# Patient Record
Sex: MALE | Race: OTHER | Employment: FULL TIME | URBAN - METROPOLITAN AREA
[De-identification: names, ages, dates, MRNs, and addresses within clinical notes are randomized per-mention and may not be internally consistent; named-entity substitution may affect disease eponyms.]

---

## 2024-09-02 ENCOUNTER — OFFICE VISIT (OUTPATIENT)
Dept: URGENT CARE | Facility: CLINIC | Age: 59
End: 2024-09-02
Payer: COMMERCIAL

## 2024-09-02 ENCOUNTER — APPOINTMENT (EMERGENCY)
Dept: CT IMAGING | Facility: HOSPITAL | Age: 59
DRG: 282 | End: 2024-09-02
Payer: COMMERCIAL

## 2024-09-02 ENCOUNTER — HOSPITAL ENCOUNTER (INPATIENT)
Facility: HOSPITAL | Age: 59
LOS: 3 days | DRG: 282 | End: 2024-09-05
Attending: EMERGENCY MEDICINE | Admitting: STUDENT IN AN ORGANIZED HEALTH CARE EDUCATION/TRAINING PROGRAM
Payer: COMMERCIAL

## 2024-09-02 VITALS
DIASTOLIC BLOOD PRESSURE: 99 MMHG | SYSTOLIC BLOOD PRESSURE: 139 MMHG | OXYGEN SATURATION: 100 % | RESPIRATION RATE: 26 BRPM | HEART RATE: 78 BPM

## 2024-09-02 DIAGNOSIS — R05.1 ACUTE COUGH: ICD-10-CM

## 2024-09-02 DIAGNOSIS — R29.898 LEG WEAKNESS: Primary | ICD-10-CM

## 2024-09-02 DIAGNOSIS — R07.9 CHEST PAIN, UNSPECIFIED TYPE: Primary | ICD-10-CM

## 2024-09-02 DIAGNOSIS — R79.89 ELEVATED TROPONIN I LEVEL: ICD-10-CM

## 2024-09-02 DIAGNOSIS — R79.89 ELEVATED TROPONIN: ICD-10-CM

## 2024-09-02 DIAGNOSIS — R61 DIAPHORESIS: ICD-10-CM

## 2024-09-02 PROBLEM — Z86.73 HISTORY OF CARDIOEMBOLIC CEREBROVASCULAR ACCIDENT (CVA): Status: ACTIVE | Noted: 2024-09-02

## 2024-09-02 PROBLEM — Z86.73 HISTORY OF CARDIOEMBOLIC CEREBROVASCULAR ACCIDENT (CVA): Status: RESOLVED | Noted: 2024-09-02 | Resolved: 2024-09-02

## 2024-09-02 PROBLEM — E11.9 TYPE 2 DIABETES MELLITUS, WITHOUT LONG-TERM CURRENT USE OF INSULIN (HCC): Status: ACTIVE | Noted: 2024-09-02

## 2024-09-02 PROBLEM — I10 ACCELERATED HYPERTENSION: Status: ACTIVE | Noted: 2024-09-02

## 2024-09-02 PROBLEM — R73.9 HYPERGLYCEMIA: Status: ACTIVE | Noted: 2024-09-02

## 2024-09-02 LAB
2HR DELTA HS TROPONIN: 1571 NG/L
4HR DELTA HS TROPONIN: 1670 NG/L
ANION GAP SERPL CALCULATED.3IONS-SCNC: 12 MMOL/L (ref 4–13)
APTT PPP: 29 SECONDS (ref 23–34)
APTT PPP: 29 SECONDS (ref 23–34)
APTT PPP: 41 SECONDS (ref 23–34)
ATRIAL RATE: 65 BPM
ATRIAL RATE: 70 BPM
BNP SERPL-MCNC: 242 PG/ML (ref 0–100)
BUN SERPL-MCNC: 18 MG/DL (ref 5–25)
CALCIUM SERPL-MCNC: 9.6 MG/DL (ref 8.4–10.2)
CARDIAC TROPONIN I PNL SERPL HS: 2088 NG/L
CARDIAC TROPONIN I PNL SERPL HS: 2187 NG/L
CARDIAC TROPONIN I PNL SERPL HS: 517 NG/L
CHLORIDE SERPL-SCNC: 107 MMOL/L (ref 96–108)
CHOLEST SERPL-MCNC: 162 MG/DL
CO2 SERPL-SCNC: 20 MMOL/L (ref 21–32)
CREAT SERPL-MCNC: 0.94 MG/DL (ref 0.6–1.3)
ERYTHROCYTE [DISTWIDTH] IN BLOOD BY AUTOMATED COUNT: 12.2 % (ref 11.6–15.1)
ERYTHROCYTE [DISTWIDTH] IN BLOOD BY AUTOMATED COUNT: 12.4 % (ref 11.6–15.1)
EST. AVERAGE GLUCOSE BLD GHB EST-MCNC: 194 MG/DL
FLUAV RNA RESP QL NAA+PROBE: NEGATIVE
FLUBV RNA RESP QL NAA+PROBE: NEGATIVE
GFR SERPL CREATININE-BSD FRML MDRD: 89 ML/MIN/1.73SQ M
GLUCOSE SERPL-MCNC: 175 MG/DL (ref 65–140)
GLUCOSE SERPL-MCNC: 201 MG/DL (ref 65–140)
GLUCOSE SERPL-MCNC: 207 MG/DL (ref 65–140)
GLUCOSE SERPL-MCNC: 210 MG/DL (ref 65–140)
HBA1C MFR BLD: 8.4 %
HCT VFR BLD AUTO: 34.1 % (ref 36.5–49.3)
HCT VFR BLD AUTO: 36.4 % (ref 36.5–49.3)
HDLC SERPL-MCNC: 49 MG/DL
HGB BLD-MCNC: 11.8 G/DL (ref 12–17)
HGB BLD-MCNC: 12.5 G/DL (ref 12–17)
INR PPP: 0.97 (ref 0.85–1.19)
INR PPP: 1 (ref 0.85–1.19)
LDLC SERPL CALC-MCNC: 76 MG/DL (ref 0–100)
MCH RBC QN AUTO: 30.6 PG (ref 26.8–34.3)
MCH RBC QN AUTO: 31 PG (ref 26.8–34.3)
MCHC RBC AUTO-ENTMCNC: 34.3 G/DL (ref 31.4–37.4)
MCHC RBC AUTO-ENTMCNC: 34.6 G/DL (ref 31.4–37.4)
MCV RBC AUTO: 89 FL (ref 82–98)
MCV RBC AUTO: 90 FL (ref 82–98)
NONHDLC SERPL-MCNC: 113 MG/DL
P AXIS: 26 DEGREES
P AXIS: 63 DEGREES
PLATELET # BLD AUTO: 301 THOUSANDS/UL (ref 149–390)
PLATELET # BLD AUTO: 311 THOUSANDS/UL (ref 149–390)
PMV BLD AUTO: 10.4 FL (ref 8.9–12.7)
PMV BLD AUTO: 10.7 FL (ref 8.9–12.7)
POTASSIUM SERPL-SCNC: 3.7 MMOL/L (ref 3.5–5.3)
PR INTERVAL: 118 MS
PR INTERVAL: 160 MS
PROCALCITONIN SERPL-MCNC: <0.05 NG/ML
PROTHROMBIN TIME: 13.6 SECONDS (ref 12.3–15)
PROTHROMBIN TIME: 13.9 SECONDS (ref 12.3–15)
QRS AXIS: 39 DEGREES
QRS AXIS: 47 DEGREES
QRSD INTERVAL: 86 MS
QRSD INTERVAL: 90 MS
QT INTERVAL: 400 MS
QT INTERVAL: 428 MS
QTC INTERVAL: 432 MS
QTC INTERVAL: 445 MS
RBC # BLD AUTO: 3.81 MILLION/UL (ref 3.88–5.62)
RBC # BLD AUTO: 4.09 MILLION/UL (ref 3.88–5.62)
RSV RNA RESP QL NAA+PROBE: NEGATIVE
SARS-COV-2 AG UPPER RESP QL IA: NEGATIVE
SARS-COV-2 RNA RESP QL NAA+PROBE: NEGATIVE
SODIUM SERPL-SCNC: 139 MMOL/L (ref 135–147)
T WAVE AXIS: -12 DEGREES
T WAVE AXIS: 10 DEGREES
TRIGL SERPL-MCNC: 185 MG/DL
VALID CONTROL: NORMAL
VENTRICULAR RATE: 65 BPM
VENTRICULAR RATE: 70 BPM
WBC # BLD AUTO: 10.17 THOUSAND/UL (ref 4.31–10.16)
WBC # BLD AUTO: 14.5 THOUSAND/UL (ref 4.31–10.16)

## 2024-09-02 PROCEDURE — 83036 HEMOGLOBIN GLYCOSYLATED A1C: CPT | Performed by: STUDENT IN AN ORGANIZED HEALTH CARE EDUCATION/TRAINING PROGRAM

## 2024-09-02 PROCEDURE — 93005 ELECTROCARDIOGRAM TRACING: CPT

## 2024-09-02 PROCEDURE — G0382 LEV 3 HOSP TYPE B ED VISIT: HCPCS

## 2024-09-02 PROCEDURE — 99223 1ST HOSP IP/OBS HIGH 75: CPT | Performed by: STUDENT IN AN ORGANIZED HEALTH CARE EDUCATION/TRAINING PROGRAM

## 2024-09-02 PROCEDURE — 96361 HYDRATE IV INFUSION ADD-ON: CPT

## 2024-09-02 PROCEDURE — 36415 COLL VENOUS BLD VENIPUNCTURE: CPT | Performed by: EMERGENCY MEDICINE

## 2024-09-02 PROCEDURE — 93010 ELECTROCARDIOGRAM REPORT: CPT | Performed by: STUDENT IN AN ORGANIZED HEALTH CARE EDUCATION/TRAINING PROGRAM

## 2024-09-02 PROCEDURE — 99285 EMERGENCY DEPT VISIT HI MDM: CPT

## 2024-09-02 PROCEDURE — 85027 COMPLETE CBC AUTOMATED: CPT | Performed by: EMERGENCY MEDICINE

## 2024-09-02 PROCEDURE — 84484 ASSAY OF TROPONIN QUANT: CPT | Performed by: EMERGENCY MEDICINE

## 2024-09-02 PROCEDURE — 70498 CT ANGIOGRAPHY NECK: CPT

## 2024-09-02 PROCEDURE — 96360 HYDRATION IV INFUSION INIT: CPT

## 2024-09-02 PROCEDURE — 70496 CT ANGIOGRAPHY HEAD: CPT

## 2024-09-02 PROCEDURE — 83880 ASSAY OF NATRIURETIC PEPTIDE: CPT | Performed by: STUDENT IN AN ORGANIZED HEALTH CARE EDUCATION/TRAINING PROGRAM

## 2024-09-02 PROCEDURE — 85610 PROTHROMBIN TIME: CPT | Performed by: EMERGENCY MEDICINE

## 2024-09-02 PROCEDURE — 96374 THER/PROPH/DIAG INJ IV PUSH: CPT

## 2024-09-02 PROCEDURE — 74174 CTA ABD&PLVS W/CONTRAST: CPT

## 2024-09-02 PROCEDURE — 96375 TX/PRO/DX INJ NEW DRUG ADDON: CPT

## 2024-09-02 PROCEDURE — 85730 THROMBOPLASTIN TIME PARTIAL: CPT | Performed by: STUDENT IN AN ORGANIZED HEALTH CARE EDUCATION/TRAINING PROGRAM

## 2024-09-02 PROCEDURE — 85730 THROMBOPLASTIN TIME PARTIAL: CPT | Performed by: EMERGENCY MEDICINE

## 2024-09-02 PROCEDURE — 84145 PROCALCITONIN (PCT): CPT | Performed by: STUDENT IN AN ORGANIZED HEALTH CARE EDUCATION/TRAINING PROGRAM

## 2024-09-02 PROCEDURE — 80061 LIPID PANEL: CPT | Performed by: STUDENT IN AN ORGANIZED HEALTH CARE EDUCATION/TRAINING PROGRAM

## 2024-09-02 PROCEDURE — 80048 BASIC METABOLIC PNL TOTAL CA: CPT | Performed by: EMERGENCY MEDICINE

## 2024-09-02 PROCEDURE — 0241U HB NFCT DS VIR RESP RNA 4 TRGT: CPT | Performed by: EMERGENCY MEDICINE

## 2024-09-02 PROCEDURE — 87811 SARS-COV-2 COVID19 W/OPTIC: CPT

## 2024-09-02 PROCEDURE — 71275 CT ANGIOGRAPHY CHEST: CPT

## 2024-09-02 PROCEDURE — 99255 IP/OBS CONSLTJ NEW/EST HI 80: CPT | Performed by: STUDENT IN AN ORGANIZED HEALTH CARE EDUCATION/TRAINING PROGRAM

## 2024-09-02 PROCEDURE — 82948 REAGENT STRIP/BLOOD GLUCOSE: CPT

## 2024-09-02 PROCEDURE — 99291 CRITICAL CARE FIRST HOUR: CPT | Performed by: EMERGENCY MEDICINE

## 2024-09-02 RX ORDER — CARVEDILOL 12.5 MG/1
12.5 TABLET ORAL 2 TIMES DAILY WITH MEALS
Status: DISCONTINUED | OUTPATIENT
Start: 2024-09-02 | End: 2024-09-02

## 2024-09-02 RX ORDER — LISINOPRIL 10 MG/1
10 TABLET ORAL DAILY
COMMUNITY
End: 2024-09-15

## 2024-09-02 RX ORDER — HEPARIN SODIUM 1000 [USP'U]/ML
3300 INJECTION, SOLUTION INTRAVENOUS; SUBCUTANEOUS ONCE
Status: COMPLETED | OUTPATIENT
Start: 2024-09-02 | End: 2024-09-02

## 2024-09-02 RX ORDER — NITROGLYCERIN 0.4 MG/1
0.4 TABLET SUBLINGUAL ONCE
Status: COMPLETED | OUTPATIENT
Start: 2024-09-02 | End: 2024-09-02

## 2024-09-02 RX ORDER — HEPARIN SODIUM 1000 [USP'U]/ML
3300 INJECTION, SOLUTION INTRAVENOUS; SUBCUTANEOUS EVERY 6 HOURS PRN
Status: DISCONTINUED | OUTPATIENT
Start: 2024-09-02 | End: 2024-09-05

## 2024-09-02 RX ORDER — FENTANYL CITRATE 50 UG/ML
50 INJECTION, SOLUTION INTRAMUSCULAR; INTRAVENOUS ONCE
Status: COMPLETED | OUTPATIENT
Start: 2024-09-02 | End: 2024-09-02

## 2024-09-02 RX ORDER — HEPARIN SODIUM 1000 [USP'U]/ML
1650 INJECTION, SOLUTION INTRAVENOUS; SUBCUTANEOUS EVERY 6 HOURS PRN
Status: DISCONTINUED | OUTPATIENT
Start: 2024-09-02 | End: 2024-09-05

## 2024-09-02 RX ORDER — NITROGLYCERIN 0.4 MG/1
0.4 TABLET SUBLINGUAL
Status: DISCONTINUED | OUTPATIENT
Start: 2024-09-02 | End: 2024-09-05 | Stop reason: HOSPADM

## 2024-09-02 RX ORDER — LISINOPRIL 20 MG/1
20 TABLET ORAL DAILY
Status: DISCONTINUED | OUTPATIENT
Start: 2024-09-02 | End: 2024-09-05

## 2024-09-02 RX ORDER — LABETALOL HYDROCHLORIDE 5 MG/ML
20 INJECTION, SOLUTION INTRAVENOUS ONCE
Status: COMPLETED | OUTPATIENT
Start: 2024-09-02 | End: 2024-09-02

## 2024-09-02 RX ORDER — ASPIRIN 81 MG/1
324 TABLET, CHEWABLE ORAL ONCE
Status: COMPLETED | OUTPATIENT
Start: 2024-09-02 | End: 2024-09-02

## 2024-09-02 RX ORDER — ATORVASTATIN CALCIUM 40 MG/1
40 TABLET, FILM COATED ORAL
Status: DISCONTINUED | OUTPATIENT
Start: 2024-09-02 | End: 2024-09-04

## 2024-09-02 RX ORDER — HYDRALAZINE HYDROCHLORIDE 20 MG/ML
10 INJECTION INTRAMUSCULAR; INTRAVENOUS EVERY 6 HOURS PRN
Status: DISCONTINUED | OUTPATIENT
Start: 2024-09-02 | End: 2024-09-05

## 2024-09-02 RX ORDER — AMLODIPINE BESYLATE 5 MG/1
5 TABLET ORAL DAILY
Status: DISCONTINUED | OUTPATIENT
Start: 2024-09-02 | End: 2024-09-05 | Stop reason: HOSPADM

## 2024-09-02 RX ORDER — INSULIN LISPRO 100 [IU]/ML
1-5 INJECTION, SOLUTION INTRAVENOUS; SUBCUTANEOUS
Status: DISCONTINUED | OUTPATIENT
Start: 2024-09-02 | End: 2024-09-05 | Stop reason: HOSPADM

## 2024-09-02 RX ORDER — HEPARIN SODIUM 10000 [USP'U]/100ML
3-20 INJECTION, SOLUTION INTRAVENOUS
Status: DISCONTINUED | OUTPATIENT
Start: 2024-09-02 | End: 2024-09-05

## 2024-09-02 RX ADMIN — NITROGLYCERIN 0.4 MG: 0.4 TABLET SUBLINGUAL at 14:32

## 2024-09-02 RX ADMIN — TICAGRELOR 180 MG: 90 TABLET ORAL at 16:35

## 2024-09-02 RX ADMIN — ATORVASTATIN CALCIUM 40 MG: 40 TABLET, FILM COATED ORAL at 16:36

## 2024-09-02 RX ADMIN — INSULIN LISPRO 1 UNITS: 100 INJECTION, SOLUTION INTRAVENOUS; SUBCUTANEOUS at 16:36

## 2024-09-02 RX ADMIN — LABETALOL HYDROCHLORIDE 20 MG: 5 INJECTION, SOLUTION INTRAVENOUS at 10:32

## 2024-09-02 RX ADMIN — AMLODIPINE BESYLATE 5 MG: 5 TABLET ORAL at 15:07

## 2024-09-02 RX ADMIN — HEPARIN SODIUM 3300 UNITS: 1000 INJECTION INTRAVENOUS; SUBCUTANEOUS at 13:26

## 2024-09-02 RX ADMIN — NITROGLYCERIN 0.4 MG: 0.4 TABLET SUBLINGUAL at 10:44

## 2024-09-02 RX ADMIN — ASPIRIN 324 MG: 81 TABLET, CHEWABLE ORAL at 12:37

## 2024-09-02 RX ADMIN — IOHEXOL 180 ML: 350 INJECTION, SOLUTION INTRAVENOUS at 10:18

## 2024-09-02 RX ADMIN — SODIUM CHLORIDE 1000 ML: 0.9 INJECTION, SOLUTION INTRAVENOUS at 10:29

## 2024-09-02 RX ADMIN — HEPARIN SODIUM 3300 UNITS: 1000 INJECTION INTRAVENOUS; SUBCUTANEOUS at 21:19

## 2024-09-02 RX ADMIN — HEPARIN SODIUM 12 UNITS/KG/HR: 10000 INJECTION, SOLUTION INTRAVENOUS at 13:28

## 2024-09-02 RX ADMIN — NITROGLYCERIN 0.4 MG: 0.4 TABLET SUBLINGUAL at 12:37

## 2024-09-02 RX ADMIN — FENTANYL CITRATE 50 MCG: 50 INJECTION INTRAMUSCULAR; INTRAVENOUS at 10:31

## 2024-09-02 NOTE — CONSULTS
Consultation - Cardiology   Aristoteles Toribio Reyes 58 y.o. male MRN: [id number]  Unit/Bed#: -01 Encounter: [id number]    Assessment & Plan      [id number]    Assessment:  58-year-old gentleman with history of stroke, diabetes, hypertension, hyperlipidemia presenting with acute onset chest pain found to have elevated troponin without ST elevation.  Presentation consistent with typical cardiac chest pain concerning for NSTEMI with pain controlled with nitro.  Melony score of 64, Killip class 1. Assessed by ED w/ no c/I to Heparin, ASA. Patient with head CT with findings of no brain hemorrhage, finding consistent with chronic appearing inferior left cerebellar infarct. Finding of nonspecific 5 mm calcification vs. Calcific lesion in right temporo-occipital junction. Although patient's troponin can be potentially explained by elevated blood pressure, he had ongoing chest pain while I was assessing him at the bedside with BPs ~160 systolic.     #NSTEMI  #HTN  #hx of stroke     Plan:  -Heparin bolus and gtt  -ASA load of 325 then begin 81 mg qd  -Would load patient with Ticagrelor 180 mg and then 90 mg BID starting 6-12 hours after initial dose. If there is a c/I due to brain pathology would d/c  -atorvastatin 80 mg   -Serial EKG, assess for JEREMY or dynamic changes.   -Trend Troponin to peak  -Hemoglobin A1c, Lipid panel, TSH  -Can give sublingual : SL (0.3-0.6mg) x3, if refractory  gtt (start 5-10mcg/min) [would call cardiology and interventionalist with repeat troponin if considering gtt]  -Restart lisinopril if no hemodynamic instability, would ideally start Beta blocker 24 hours from presentation   -Up-titrate anti hypertensive regimen to control BP       History of Present Illness   Physician Requesting Consult: Ayad Noriega MD  Reason for Consult / Principal Problem: NSTEMI  HPI: Aristoteles Toribio Reyes is a 58 y.o. year old male who presents with history of stroke, hypertension, type 2 diabetes  presenting with substernal chest pain with multiple episodes lasting anywhere from 15 to 30 minutes the last 18 hours.  Patient reports that the chest pain radiates to his arm and is associated with a headache and nausea.  He went to sleep but then woke up in the morning and had another episode of chest pain which prompted him to come to the ED.  Since coming to the ED patient's pain resolved with heparin, nitro, and morphine.  Initial EKG sent to me showed no ST elevations with no serial changes.  Of note the patient had significant elevated blood pressure and reported not taking his lisinopril.  He mentioned that he does miss medications from time to time.  Social history obtained from chart review not from patient as he had his whole family in the room (Uses cannabis but no mention of tobacco or ETOH). His wife reported that when they were moving him around in the ED and the floor he was having worsening anginal chest pain. I discussed the case with the ED prior to my examination and team the patient had a presentation concerning for ACS.  They ruled out aortic dissection and I recommended if no contraindication, the patient had adequate BP control, and rising troponin with clinical risk factors then it was likely appropriate to begin heparin.  Labs significant for initial troponin of 517 with current peak at 2088.  Patient with a WBC count of 14.5 with hemoglobin 11.8. W    Inpatient consult to Cardiology  Consult performed by: Jack Baptiste MD  Consult ordered by: Ayad Noriega MD          Review of Systems   Constitutional:  Positive for activity change and diaphoresis.   HENT:  Negative for congestion.    Respiratory:  Positive for cough and chest tightness. Negative for shortness of breath.    Cardiovascular:  Positive for chest pain. Negative for palpitations and leg swelling.   Gastrointestinal:  Positive for nausea. Negative for abdominal distention.   Musculoskeletal:  Negative for back pain.   Skin:   Negative for rash.   Neurological:  Positive for dizziness and light-headedness. Negative for weakness.       Historical Information   Past Medical History:   Diagnosis Date    History of cardioembolic cerebrovascular accident (CVA) 09/02/2024    History of cardioembolic cerebrovascular accident (CVA) 09/02/2024     No past surgical history on file.  Social History     Substance and Sexual Activity   Alcohol Use Not on file     Social History     Substance and Sexual Activity   Drug Use Not on file     E-Cigarette/Vaping     E-Cigarette/Vaping Substances     Social History     Tobacco Use   Smoking Status Not on file   Smokeless Tobacco Not on file     Family History: No family history on file.    Meds/Allergies   all current active meds have been reviewed  No Known Allergies    Objective   Vitals: Blood pressure (!) 172/88, pulse 73, temperature 97.7 °F (36.5 °C), resp. rate (!) 27, weight 58 kg (127 lb 13.9 oz), SpO2 97%.  Orthostatic Blood Pressures      Flowsheet Row Most Recent Value   Blood Pressure 172/88 filed at 09/02/2024 1345              Intake/Output Summary (Last 24 hours) at 9/2/2024 1439  Last data filed at 9/2/2024 1412  Gross per 24 hour   Intake 4.84 ml   Output --   Net 4.84 ml       Invasive Devices       Peripheral Intravenous Line  Duration             Peripheral IV 09/02/24 Left Antecubital <1 day    Peripheral IV 09/02/24 Right Antecubital <1 day                    Physical Exam  GEN: NAD  Cardiac: RRR, No murmurs  Neck: No jvd  Lung: CTAB  Ext: No edema     Lab Results: I have personally reviewed pertinent lab results.    Imaging: I have personally reviewed pertinent reports.    EKG: NSR   VTE Prophylaxis: Heparin    Code Status: Level 1 - Full Code  Advance Directive and Living Will:      Power of :    POLST:      Counseling / Coordination of Care  Total floor / unit time spent today 45 minutes.  Greater than 50% of total time was spent with the patient and / or family counseling  and / or coordination of care.  A description of the counseling / coordination of care: This case case with the ED, hospitalist, and patient and family with .

## 2024-09-02 NOTE — ASSESSMENT & PLAN NOTE
Looks like typical chest pain with elevated troponin  Cardiology recommended ER to start IV heparin  Patient is on aspirin 325 mg after the stroke, continue with the same dose and Lipitor 80 mg  Echocardiogram ordered

## 2024-09-02 NOTE — LETTER
[facility] 4TH FLOOR MED SURG UNIT  [address]  [place] [address]  Dept: [phone]    September 3, 2024     Patient: Anitra Allan   Date of Birth:    Date of Visit: 9/2/2024     To Whom it May Concern:    Anitra Allan was at the hospital visiting a patient under my care on 9/3/2024.     If you have any questions or concerns, please don't hesitate to call.         Sincerely,          Jose Whipple PA-C

## 2024-09-02 NOTE — ASSESSMENT & PLAN NOTE
Patient admitted with blood pressure more than 200 systolic  Admitted with retrosternal crushing chest pain since last night  EKG showed normal sinus rhythm  Patient was given IV NT hypertensives, sublingual nitro  Blood pressure later improved, troponin significantly elevated more than 500  Patient not sure about all of his medicines but he brought lisinopril, aspirin 325 mg and metformin 1 g twice daily after the stroke  Will resume lisinopril 40 mg

## 2024-09-02 NOTE — ASSESSMENT & PLAN NOTE
"No results found for: \"HGBA1C\"    Recent Labs     09/02/24  0944 09/02/24  1002   POCGLU 207* 201*       Blood Sugar Average: Last 72 hrs:  (P) 201  Will check glycohemoglobin  hold home dose of metformin  Insulin sliding scale  "

## 2024-09-02 NOTE — PLAN OF CARE
Problem: PAIN - ADULT  Goal: Verbalizes/displays adequate comfort level or baseline comfort level  Description: Interventions:  - Encourage patient to monitor pain and request assistance  - Assess pain using appropriate pain scale  - Administer analgesics based on type and severity of pain and evaluate response  - Implement non-pharmacological measures as appropriate and evaluate response  - Consider cultural and social influences on pain and pain management  - Notify physician/advanced practitioner if interventions unsuccessful or patient reports new pain  Outcome: Progressing     Problem: INFECTION - ADULT  Goal: Absence or prevention of progression during hospitalization  Description: INTERVENTIONS:  - Assess and monitor for signs and symptoms of infection  - Monitor lab/diagnostic results  - Monitor all insertion sites, i.e. indwelling lines, tubes, and drains  - Monitor endotracheal if appropriate and nasal secretions for changes in amount and color  - Lebeau appropriate cooling/warming therapies per order  - Administer medications as ordered  - Instruct and encourage patient and family to use good hand hygiene technique  - Identify and instruct in appropriate isolation precautions for identified infection/condition  Outcome: Progressing  Goal: Absence of fever/infection during neutropenic period  Description: INTERVENTIONS:  - Monitor WBC    Outcome: Progressing     Problem: SAFETY ADULT  Goal: Patient will remain free of falls  Description: INTERVENTIONS:  - Educate patient/family on patient safety including physical limitations  - Instruct patient to call for assistance with activity   - Consult OT/PT to assist with strengthening/mobility   - Keep Call bell within reach  - Keep bed low and locked with side rails adjusted as appropriate  - Keep care items and personal belongings within reach  - Initiate and maintain comfort rounds  - Make Fall Risk Sign visible to staff  - Offer Toileting every  Hours,  in advance of need  - Initiate/Maintain alarm  - Obtain necessary fall risk management equipment:   - Apply yellow socks and bracelet for high fall risk patients  - Consider moving patient to room near nurses station  Outcome: Progressing  Goal: Maintain or return to baseline ADL function  Description: INTERVENTIONS:  -  Assess patient's ability to carry out ADLs; assess patient's baseline for ADL function and identify physical deficits which impact ability to perform ADLs (bathing, care of mouth/teeth, toileting, grooming, dressing, etc.)  - Assess/evaluate cause of self-care deficits   - Assess range of motion  - Assess patient's mobility; develop plan if impaired  - Assess patient's need for assistive devices and provide as appropriate  - Encourage maximum independence but intervene and supervise when necessary  - Involve family in performance of ADLs  - Assess for home care needs following discharge   - Consider OT consult to assist with ADL evaluation and planning for discharge  - Provide patient education as appropriate  Outcome: Progressing  Goal: Maintains/Returns to pre admission functional level  Description: INTERVENTIONS:  - Perform AM-PAC 6 Click Basic Mobility/ Daily Activity assessment daily.  - Set and communicate daily mobility goal to care team and patient/family/caregiver.   - Collaborate with rehabilitation services on mobility goals if consulted  - Perform Range of Motion  times a day.  - Reposition patient every  hours.  - Dangle patient  times a day  - Stand patient  times a day  - Ambulate patient  times a day  - Out of bed to chair  times a day   - Out of bed for meals  times a day  - Out of bed for toileting  - Record patient progress and toleration of activity level   Outcome: Progressing     Problem: DISCHARGE PLANNING  Goal: Discharge to home or other facility with appropriate resources  Description: INTERVENTIONS:  - Identify barriers to discharge w/patient and caregiver  - Arrange for  needed discharge resources and transportation as appropriate  - Identify discharge learning needs (meds, wound care, etc.)  - Arrange for interpretive services to assist at discharge as needed  - Refer to Case Management Department for coordinating discharge planning if the patient needs post-hospital services based on physician/advanced practitioner order or complex needs related to functional status, cognitive ability, or social support system  Outcome: Progressing     Problem: Knowledge Deficit  Goal: Patient/family/caregiver demonstrates understanding of disease process, treatment plan, medications, and discharge instructions  Description: Complete learning assessment and assess knowledge base.  Interventions:  - Provide teaching at level of understanding  - Provide teaching via preferred learning methods  Outcome: Progressing

## 2024-09-02 NOTE — ED PROVIDER NOTES
History  Chief Complaint   Patient presents with    Chest Pain     Pt BIB EMS c/o chest pain that is worst when he is resting, pain started last night,sent to ER from urgent care, HX  TIA, HTN.      Patient is a 58-year-old male past medical history of CVA, hypertension, diabetes presenting with chest pain.  Patient notes crushing squeezing chest pain since 11 PM last night which is constant and worse at rest, nonradiating.  Associated with shortness of breath, nausea, headache, lightheadedness.  Came by EMS and did not receive any medication from them however states he did take his antihypertensive medications this morning.  He states that this happened 2 months ago prior to his TIA.  Denies any leg pain or swelling, cough or fevers, vomiting.        Prior to Admission Medications   Prescriptions Last Dose Informant Patient Reported? Taking?   lisinopril (ZESTRIL) 10 mg tablet   Yes Yes   Sig: Take 10 mg by mouth daily   metFORMIN (GLUCOPHAGE) 1000 MG tablet   Yes Yes   Sig: Take 1,000 mg by mouth 2 (two) times a day with meals      Facility-Administered Medications: None       Past Medical History:   Diagnosis Date    History of cardioembolic cerebrovascular accident (CVA) 09/02/2024    History of cardioembolic cerebrovascular accident (CVA) 09/02/2024       No past surgical history on file.    No family history on file.  I have reviewed and agree with the history as documented.    E-Cigarette/Vaping     E-Cigarette/Vaping Substances     Social History     Tobacco Use    Smoking status: Some Days     Types: Cigarettes   Substance Use Topics    Alcohol use: Never    Drug use: Never       Review of Systems   All other systems reviewed and are negative.      Physical Exam  Physical Exam  Vitals reviewed.   Constitutional:       General: He is not in acute distress.     Appearance: Normal appearance. He is not ill-appearing.   HENT:      Mouth/Throat:      Mouth: Mucous membranes are moist.   Eyes:      Extraocular  Movements: Extraocular movements intact.      Conjunctiva/sclera: Conjunctivae normal.      Pupils: Pupils are equal, round, and reactive to light.   Cardiovascular:      Rate and Rhythm: Normal rate and regular rhythm.      Pulses:           Dorsalis pedis pulses are 2+ on the right side and 2+ on the left side.      Heart sounds: Normal heart sounds.   Pulmonary:      Effort: Pulmonary effort is normal.      Breath sounds: Normal breath sounds.   Abdominal:      General: Abdomen is flat.      Palpations: Abdomen is soft.      Tenderness: There is no abdominal tenderness.   Musculoskeletal:         General: No swelling. Normal range of motion.      Cervical back: Neck supple.      Right lower leg: No tenderness. No edema.      Left lower leg: No tenderness. No edema.   Skin:     General: Skin is warm and dry.   Neurological:      General: No focal deficit present.      Mental Status: He is alert.      Comments: Left leg weakness 3 out of 5 as compared to 4 out of 5 on the right, sensation intact, no other weakness in the extremities or cranial nerves   Psychiatric:         Mood and Affect: Mood normal.         Vital Signs  ED Triage Vitals   Temperature Pulse Respirations Blood Pressure SpO2   09/02/24 1000 09/02/24 1000 09/02/24 1000 09/02/24 1000 09/02/24 1000   97.7 °F (36.5 °C) 72 18 (!) 229/107 99 %      Temp src Heart Rate Source Patient Position - Orthostatic VS BP Location FiO2 (%)   -- 09/02/24 1000 09/02/24 1500 09/02/24 1500 --    Monitor Lying Right arm       Pain Score       09/02/24 1031       7           Vitals:    09/02/24 1340 09/02/24 1345 09/02/24 1434 09/02/24 1500   BP:  (!) 172/88 166/93 (!) 174/92   Pulse: 70 73 82 79   Patient Position - Orthostatic VS:    Lying         Visual Acuity  Visual Acuity      Flowsheet Row Most Recent Value   L Pupil Size (mm) 3   R Pupil Size (mm) 3            ED Medications  Medications   heparin (porcine) 25,000 units in 0.45% NaCl 250 mL infusion (premix) (12  Units/kg/hr × 55 kg (Order-Specific) Intravenous Rate/Dose Verify 9/2/24 1412)   heparin (porcine) injection 3,300 Units (has no administration in time range)   heparin (porcine) injection 1,650 Units (has no administration in time range)   lisinopril (ZESTRIL) tablet 20 mg (20 mg Oral Not Given 9/2/24 1446)   insulin lispro (HumALOG/ADMELOG) 100 units/mL subcutaneous injection 1-5 Units (1 Units Subcutaneous Given 9/2/24 1636)   atorvastatin (LIPITOR) tablet 40 mg (40 mg Oral Given 9/2/24 1636)   hydrALAZINE (APRESOLINE) injection 10 mg (has no administration in time range)   amLODIPine (NORVASC) tablet 5 mg (5 mg Oral Given 9/2/24 1507)   nitroglycerin (NITROSTAT) SL tablet 0.4 mg (0.4 mg Sublingual Given 9/2/24 1432)   aspirin (ECOTRIN LOW STRENGTH) EC tablet 81 mg (has no administration in time range)   ticagrelor (BRILINTA) tablet 90 mg (has no administration in time range)   labetalol (NORMODYNE) injection 20 mg (20 mg Intravenous Given 9/2/24 1032)   sodium chloride 0.9 % bolus 1,000 mL (0 mL Intravenous Stopped 9/2/24 1333)   fentaNYL injection 50 mcg (50 mcg Intravenous Given 9/2/24 1031)   iohexol (OMNIPAQUE) 350 MG/ML injection (MULTI-DOSE) 180 mL (180 mL Intravenous Given 9/2/24 1018)   nitroglycerin (NITROSTAT) SL tablet 0.4 mg (0.4 mg Sublingual Given 9/2/24 1044)   nitroglycerin (NITROSTAT) SL tablet 0.4 mg (0.4 mg Sublingual Given 9/2/24 1237)   aspirin chewable tablet 324 mg (324 mg Oral Given 9/2/24 1237)   heparin (porcine) injection 3,300 Units (3,300 Units Intravenous Given 9/2/24 1326)   ticagrelor (BRILINTA) tablet 180 mg (180 mg Oral Given 9/2/24 1635)       Diagnostic Studies  Results Reviewed       Procedure Component Value Units Date/Time    HS Troponin I 4hr [[id number]]  (Abnormal) Collected: 09/02/24 1458    Lab Status: Final result Specimen: Blood from Hand, Right Updated: 09/02/24 1530     hs TnI 4hr 2,187 ng/L      Delta 4hr hsTnI 1,670 ng/L     Procalcitonin [[id number]]  (Normal)  Collected: 09/02/24 1006    Lab Status: Final result Specimen: Blood from Arm, Left Updated: 09/02/24 1459     Procalcitonin <0.05 ng/ml     Lipid panel [[id number]]  (Abnormal) Collected: 09/02/24 1006    Lab Status: Final result Specimen: Blood from Arm, Left Updated: 09/02/24 1432     Cholesterol 162 mg/dL      Triglycerides 185 mg/dL      HDL, Direct 49 mg/dL      LDL Calculated 76 mg/dL      Non-HDL-Chol (CHOL-HDL) 113 mg/dl     B-Type Natriuretic Peptide(BNP) [[id number]]  (Abnormal) Collected: 09/02/24 1302    Lab Status: Final result Specimen: Blood from Arm, Right Updated: 09/02/24 1351      pg/mL     HS Troponin I 2hr [[id number]]  (Abnormal) Collected: 09/02/24 1302    Lab Status: Final result Specimen: Blood from Arm, Right Updated: 09/02/24 1330     hs TnI 2hr 2,088 ng/L      Delta 2hr hsTnI 1,571 ng/L     APTT six (6) hours after Heparin bolus/drip initiation or dosing change [[id number]]  (Normal) Collected: 09/02/24 1302    Lab Status: Final result Specimen: Blood from Arm, Right Updated: 09/02/24 1320     PTT 29 seconds     Protime-INR [[id number]]  (Normal) Collected: 09/02/24 1302    Lab Status: Final result Specimen: Blood from Arm, Right Updated: 09/02/24 1320     Protime 13.9 seconds      INR 1.00    Narrative:      INR Therapeutic Range    Indication                                             INR Range      Atrial Fibrillation                                               2.0-3.0  Hypercoagulable State                                    2.0.2.3  Left Ventricular Asist Device                            2.0-3.0  Mechanical Heart Valve                                  -    Aortic(with afib, MI, embolism, HF, LA enlargement,    and/or coagulopathy)                                     2.0-3.0 (2.5-3.5)     Mitral                                                             2.5-3.5  Prosthetic/Bioprosthetic Heart Valve               2.0-3.0  Venous thromboembolism (VTE: VT, PE        2.0-3.0     Hemoglobin A1C [[id number]] Collected: 09/02/24 1006    Lab Status: In process Specimen: Blood from Arm, Left Updated: 09/02/24 1315    CBC [[id number]]  (Abnormal) Collected: 09/02/24 1302    Lab Status: Final result Specimen: Blood from Arm, Right Updated: 09/02/24 1307     WBC 14.50 Thousand/uL      RBC 3.81 Million/uL      Hemoglobin 11.8 g/dL      Hematocrit 34.1 %      MCV 90 fL      MCH 31.0 pg      MCHC 34.6 g/dL      RDW 12.4 %      Platelets 301 Thousands/uL      MPV 10.4 fL     FLU/RSV/COVID - if FLU/RSV clinically relevant [[id number]]  (Normal) Collected: 09/02/24 1007    Lab Status: Final result Specimen: Nares from Nose Updated: 09/02/24 1124     SARS-CoV-2 Negative     INFLUENZA A PCR Negative     INFLUENZA B PCR Negative     RSV PCR Negative    Narrative:      This test has been performed using the CoV-2/Flu/RSV plus assay on the [employer] platform. This test has been validated by the  and verified by the performing laboratory.     This test is designed to amplify and detect the following: nucleocapsid (N), envelope (E), and RNA-dependent RNA polymerase (RdRP) genes of the SARS-CoV-2 genome; matrix (M), basic polymerase (PB2), and acidic protein (PA) segments of the influenza A genome; matrix (M) and non-structural protein (NS) segments of the influenza B genome, and the nucleocapsid genes of RSV A and RSV B.     Positive results are indicative of the presence of Flu A, Flu B, RSV, and/or SARS-CoV-2 RNA. Positive results for SARS-CoV-2 or suspected novel influenza should be reported to state, local, or federal health departments according to local reporting requirements.      All results should be assessed in conjunction with clinical presentation and other laboratory markers for clinical management.     FOR PEDIATRIC PATIENTS - copy/paste COVID Guidelines URL to browser: [email or web address]       HS Troponin 0hr (reflex  protocol) [[id number]]  (Abnormal) Collected: 09/02/24 1006    Lab Status: Final result Specimen: Blood from Arm, Left Updated: 09/02/24 1036     hs TnI 0hr 517 ng/L     Basic metabolic panel [[id number]]  (Abnormal) Collected: 09/02/24 1006    Lab Status: Final result Specimen: Blood from Arm, Left Updated: 09/02/24 1032     Sodium 139 mmol/L      Potassium 3.7 mmol/L      Chloride 107 mmol/L      CO2 20 mmol/L      ANION GAP 12 mmol/L      BUN 18 mg/dL      Creatinine 0.94 mg/dL      Glucose 210 mg/dL      Calcium 9.6 mg/dL      eGFR 89 ml/min/1.73sq m     Narrative:      National Kidney Disease Foundation guidelines for Chronic Kidney Disease (CKD):     Stage 1 with normal or high GFR (GFR > 90 mL/min/1.73 square meters)    Stage 2 Mild CKD (GFR = 60-89 mL/min/1.73 square meters)    Stage 3A Moderate CKD (GFR = 45-59 mL/min/1.73 square meters)    Stage 3B Moderate CKD (GFR = 30-44 mL/min/1.73 square meters)    Stage 4 Severe CKD (GFR = 15-29 mL/min/1.73 square meters)    Stage 5 End Stage CKD (GFR <15 mL/min/1.73 square meters)  Note: GFR calculation is accurate only with a steady state creatinine    Protime-INR [[id number]]  (Normal) Collected: 09/02/24 1006    Lab Status: Final result Specimen: Blood from Arm, Left Updated: 09/02/24 1025     Protime 13.6 seconds      INR 0.97    Narrative:      INR Therapeutic Range    Indication                                             INR Range      Atrial Fibrillation                                               2.0-3.0  Hypercoagulable State                                    2.0.2.3  Left Ventricular Asist Device                            2.0-3.0  Mechanical Heart Valve                                  -    Aortic(with afib, MI, embolism, HF, LA enlargement,    and/or coagulopathy)                                     2.0-3.0 (2.5-3.5)     Mitral                                                             2.5-3.5  Prosthetic/Bioprosthetic Heart Valve                2.0-3.0  Venous thromboembolism (VTE: VT, PE        2.0-3.0    APTT [[id number]]  (Normal) Collected: 09/02/24 1006    Lab Status: Final result Specimen: Blood from Arm, Left Updated: 09/02/24 1025     PTT 29 seconds     CBC and Platelet [[id number]]  (Abnormal) Collected: 09/02/24 1006    Lab Status: Final result Specimen: Blood from Arm, Left Updated: 09/02/24 1013     WBC 10.17 Thousand/uL      RBC 4.09 Million/uL      Hemoglobin 12.5 g/dL      Hematocrit 36.4 %      MCV 89 fL      MCH 30.6 pg      MCHC 34.3 g/dL      RDW 12.2 %      Platelets 311 Thousands/uL      MPV 10.7 fL     Fingerstick Glucose (POCT) [[id number]]  (Abnormal) Collected: 09/02/24 1002    Lab Status: Final result Specimen: Blood Updated: 09/02/24 1003     POC Glucose 201 mg/dl                    CTA dissection protocol chest/abdomen/pelvis   Final Result by Konstantin Contreras MD (09/02 1144)      No acute findings in the chest, abdomen, or pelvis. Specifically, no findings of aortic aortic syndrome.                  Resident: HERON BEST I, the attending radiologist, have reviewed the images and agree with the final report above.      Workstation performed: [id number]         CTA stroke alert (head/neck)   Final Result by Masha Callaway MD (09/02 1045)      1.   Patent major vessels of the [place] of cason without high-grade stenosis.  No aneurysm.   2.  Atherosclerotic changes without hemodynamically significant stenosis in the cervical carotid or vertebral arteries.               I personally discussed this study with JAMES REDMOND on 9/2/2024 10: 41 AM.               Workstation performed: [id number]         CT stroke alert brain   Final Result by Masha Callaway MD (09/02 1229)   Addendum (preliminary) 1 of 1 by Masha Callaway MD (09/02 1229)   ADDENDUM:      There is nonspecific 5 mm calcification versus calcific lesion in the    right temporo-occipital junction (series 2 image 20). Recommend further    characterization with brain  MRI without and with contrast if no prior    outside imaging is available.         This study was marked in EPIC for notification and follow-up.               Final      1.  No acute intracranial CT abnormality.   2.  Chronic appearing inferior left cerebellar infarct.                     I personally discussed this study with TIFFANIE THEODORE on 9/2/2024 10:    41 AM.         Workstation performed: [id number]                    Procedures  ECG 12 Lead Documentation Only    Date/Time: 9/2/2024 10:25 AM    Performed by: Tiffanie Theodore DO  Authorized by: Tiffanie Theodore DO    Patient location:  ED  Previous ECG:     Previous ECG:  Unavailable  Interpretation:     Interpretation: normal    Rate:     ECG rate assessment: normal    Rhythm:     Rhythm: sinus rhythm    Ectopy:     Ectopy: none    QRS:     QRS axis:  Normal    QRS intervals:  Normal  Conduction:     Conduction: normal    ST segments:     ST segments:  Normal  T waves:     T waves: normal    ECG 12 Lead Documentation Only    Date/Time: 9/2/2024 10:48 AM    Performed by: Tiffanie Theodore DO  Authorized by: Tiffanie Theodore DO    Patient location:  ED  Previous ECG:     Previous ECG:  Compared to current    Similarity:  No change  Interpretation:     Interpretation: normal    Rate:     ECG rate assessment: normal    Rhythm:     Rhythm: sinus rhythm    Ectopy:     Ectopy: none    QRS:     QRS axis:  Normal    QRS intervals:  Normal  Conduction:     Conduction: normal    ST segments:     ST segments:  Normal  T waves:     T waves: normal    CriticalCare Time    Date/Time: 9/2/2024 5:18 PM    Performed by: Tiffanie Theodore DO  Authorized by: Tiffanie Theodore DO    Critical care provider statement:     Critical care time (minutes):  35    Critical care time was exclusive of:  Separately billable procedures and treating other patients and teaching time    Critical care was necessary to treat or prevent imminent or life-threatening  deterioration of the following conditions:  Cardiac failure, circulatory failure and CNS failure or compromise    Critical care was time spent personally by me on the following activities:  Obtaining history from patient or surrogate, development of treatment plan with patient or surrogate, discussions with consultants, evaluation of patient's response to treatment, examination of patient, ordering and performing treatments and interventions, interpretation of cardiac output measurements, ordering and review of laboratory studies, ordering and review of radiographic studies, re-evaluation of patient's condition and review of old charts           ED Course  ED Course as of 09/02/24 1719   Mon Sep 02, 2024   1009 Patient states last time he walked was at 7 AM roughly 3 hours ago and at that time did not notice left leg weakness which he states does feel heavier now.   1042 Head imaging unremarkable, neurology has evaluated the patient states recommending admission to stroke pathway with aspirin load, patient does have elevated troponin, will continue repeat EKGs, have discussed with neurology further blood pressure management the setting of likely cardiac source and they state that they are comfortable with this.   1047 Patient notes improved chest pain but states it is still present and is still hypertensive, will give nitroglycerin.  Repeat EKG does not show any ischemic changes.   1214 CT dissection negative, will discuss with cardiology and admit.                                 SBIRT 20yo+      Flowsheet Row Most Recent Value   Initial Alcohol Screen: US AUDIT-C     1. How often do you have a drink containing alcohol? 0 Filed at: 09/02/2024 1004   Audit-C Score 0 Filed at: 09/02/2024 1004   KODI: How many times in the past year have you...    Used an illegal drug or used a prescription medication for non-medical reasons? Never Filed at: 09/02/2024 1004                      Medical Decision Making  Patient is a  58-year-old male with past medical history of CVA, hypertension, diabetes presenting with chest pain.  Patient has very uncomfortable appearing at bedside with mentating appropriately and answering all questions in no acute distress but currently hypertensive with lungs are to auscultation, no abdominal tenderness, moving all extremities equally with the exception of the left leg which is 3 out of 5 as compared to 4 out of 5 on the right.  Patient has equal pulses and no other significant physical exam findings.  Given chest pain in the setting of neurologic deficit will obtain CT dissection and have called stroke alert however do not know time of onset of left leg weakness as patient states that this is new for him.  Will consult neurology, obtain imaging to rule out dissection, large vessel occlusion, hemorrhage, edema, mass intracranially, other intrathoracic or intra-abdominal pathology, give pain control and labetalol to control blood pressure and heart rate until dissection can be ruled out and discussed with neurology    Amount and/or Complexity of Data Reviewed  Labs: ordered.  Radiology: ordered.    Risk  OTC drugs.  Prescription drug management.  Decision regarding hospitalization.                 Disposition  Final diagnoses:   Leg weakness   Elevated troponin     Time reflects when diagnosis was documented in both MDM as applicable and the Disposition within this note       Time User Action Codes Description Comment    9/2/2024 10:05 AM Tiffanie Theodore Add [R29.898] Leg weakness     9/2/2024 12:44 PM Ayad Noriega Add [R79.89] Elevated troponin I level     9/2/2024 12:48 PM Tiffanie Theodore Add [R79.89] Elevated troponin           ED Disposition       ED Disposition   Admit    Condition   Stable    Date/Time   Mon Sep 2, 2024 1248    Comment   Case was discussed with Dr. Noriega and the patient's admission status was agreed to be Admission Status: inpatient status to the service of Dr. Noriega .                Follow-up Information    None         Current Discharge Medication List        CONTINUE these medications which have NOT CHANGED    Details   lisinopril (ZESTRIL) 10 mg tablet Take 10 mg by mouth daily      metFORMIN (GLUCOPHAGE) 1000 MG tablet Take 1,000 mg by mouth 2 (two) times a day with meals             No discharge procedures on file.    PDMP Review       None            ED Provider  Electronically Signed by             Tiffanie Theodore DO  09/02/24 7468

## 2024-09-02 NOTE — PROGRESS NOTES
[facility]        NAME: Russ Bae is a 58 y.o. male  : 1965    MRN: [id number]  DATE: 2024  TIME: 9:42 AM    Assessment and Plan   Chest pain, unspecified type [R07.9]  1. Chest pain, unspecified type  ECG 12 lead      2. Acute cough  Poct Covid 19 Rapid Antigen Test      3. Diaphoresis  Fingerstick Glucose (POCT)        Blood glucose 207.  EKG done showing NSR with occasional PVCs. No ST T wave changes.  Rapid COVID negative.  Patient transported to ER via EMS.     Patient Instructions     Proceed to the ER.     Chief Complaint     Chief Complaint   Patient presents with    Chest Pain     Pt has been having chest pain since last night         History of Present Illness       The patient presents today with his wife and son for complaints of crushing chest pain that started last night. Patient and wife only speak [place]e, but son is here to translate. Son reports history of mini strokes, and diabetes. Not from the area and thought the clinic was a hospital. Unsure of current med list.         Review of Systems   Review of Systems   Constitutional:  Positive for diaphoresis. Negative for chills and fever.   HENT:  Negative for congestion.    Respiratory:  Positive for cough and shortness of breath.    Cardiovascular:  Positive for chest pain.   Gastrointestinal:  Positive for nausea. Negative for abdominal pain, diarrhea and vomiting.   Musculoskeletal:  Negative for myalgias.   Skin:  Negative for rash.   Neurological:  Positive for tremors.         Current Medications     No current outpatient medications on file.    Current Allergies     Allergies as of 2024    (Not on File)            The following portions of the patient's history were reviewed and updated as appropriate: allergies, current medications, past family history, past medical history, past social history, past surgical history and problem list.     History reviewed. No pertinent past medical  history.    History reviewed. No pertinent surgical history.    History reviewed. No pertinent family history.      Medications have been verified.        Objective   /99   Pulse 80   Resp (!) 26   SpO2 100%        Physical Exam     Physical Exam  Vitals and nursing note reviewed.   Constitutional:       General: He is in acute distress.      Appearance: Normal appearance.      Comments: Visibly in severe pain and distress and will occasionally  his chest.    HENT:      Head: Normocephalic and atraumatic.      Right Ear: External ear normal.      Left Ear: External ear normal.      Mouth/Throat:      Mouth: Mucous membranes are moist.   Eyes:      Pupils: Pupils are equal, round, and reactive to light.   Cardiovascular:      Rate and Rhythm: Normal rate and regular rhythm.      Heart sounds: Normal heart sounds. No murmur heard.  Pulmonary:      Effort: Pulmonary effort is normal. No respiratory distress.      Breath sounds: No decreased air movement. No decreased breath sounds, wheezing, rhonchi or rales.      Comments: Occasional dry cough.  Abdominal:      Comments: Intermittent dry heaving.   Skin:     General: Skin is warm and dry.   Neurological:      Mental Status: He is oriented to person, place, and time and easily aroused. Mental status is at baseline.      Cranial Nerves: No facial asymmetry.      Comments: BL arms with intermittent muscle tremors.    Psychiatric:         Mood and Affect: Mood normal.         Behavior: Behavior normal.

## 2024-09-02 NOTE — ASSESSMENT & PLAN NOTE
On CAT scan patient has old inferior cerebellar infarct  Does not have any focal logical deficit on exam  He ambulates without any assistance  Continue aspirin 325 mg  Start Lipitor 80 mg  Repeat CTA head and neck done in ER did not show any acute stroke

## 2024-09-02 NOTE — CONSULTS
NEUROLOGY CONSULT NOTE     Name: Aristoteles Toribio Reyes   Age & Sex: 58 y.o. male   MRN: [id number]  Unit/Bed#: -01   Encounter: [id number]  Length of Stay: 0    Recommendations for outpatient neurological follow up have yet to be determined.       ASSESSMENT & PLAN       Stroke-like symptoms  Assessment & Plan  58-year-old male past medical history of CVA, hypertension, diabetes presenting with chest pain and stroke-like symptoms. Initially presented with LLE weakness which improved on repeat exam. Not a candidate for TNK given minor, resolving symptoms. Found to have elevated troponin concerning for NSTEMI. Recommend TIA workup.    Work-up:  CT head showed chronic L cerebellar infarct, no evidence of acute stroke of hemorrhage. Notable also for nonspecific 5 mm calcification versus calcific lesion in the right temporo-occipital junction.  CTA H/N showed no evidence of LVO or hemodynamically significant stenosis  LDL 76  Initial troponin 517     Recommendations:  Started on ASA, brilinta, heparin per cardiology. From the standpoint of his previous infarct, would recommend that he is maintained at least on ASA 81 mg daily  Recommend MRI brain w/wo for TIA/stroke w/u and further evaluation of R temporo-occipital lesion  Atorvastatin 40mg  TTE   Telemetry  A1C pending  PT/OT/ST  Stat CT head with worsening in neuro exam, notify neurology with any changes in exam     SUBJECTIVE     Reason for Consult / Principal Problem: stroke-like symptoms    HPI: Aristoteles Toribio Reyes is a 58 y.o.  male past medical history of CVA, hypertension, diabetes presenting with chest pain and stroke-like symptoms.     Nurse at bedside provided Mongolian translation as pt was Mohawk-speaking only. Pt endorsed crushing, squeezing chest pain since 11 PM last night. This morning around 7 AM he was able to walk around without any weakness. However, some time after this but prior to arrival at approximately 10 AM he began to have  weakness in the LLE which fluctuates. Unclear exact LKW. Initial NIHSS 1 for LLE drift per the ED, but noted poor effort on their exam. NIHSS 0 on repeat exam at bedside. He endorsed feeling improvement in the weakness in his LLE, but stated that he still felt it was present to a lesser extent.     CTH showed chronic L cerebellar infarct, but no evidence of any acute intracranial abnormalities. CTA negative for LVO or hemodynamically significant stenosis. He was not a candidate for TNK given minor, resolving symptoms. CTH read later addended, noting a nonspecific 5 mm calcification vs calcific lesion in the R temporo-occipital junction and recommended further characterization with MRI brain. Also found to have elevated troponin concerning for NSTEMI, cardiology consulted.       Inpatient consult to Neurology  Consult performed by: Miim Hodges MD  Consult ordered by: Tiffanie Theodore DO          Historical Information   Past Medical History:   Diagnosis Date    History of cardioembolic cerebrovascular accident (CVA) 09/02/2024    History of cardioembolic cerebrovascular accident (CVA) 09/02/2024     No past surgical history on file.  Social History   Social History     Substance and Sexual Activity   Alcohol Use Never     Social History     Substance and Sexual Activity   Drug Use Never     E-Cigarette/Vaping     E-Cigarette/Vaping Substances     Social History     Tobacco Use   Smoking Status Some Days    Types: Cigarettes   Smokeless Tobacco Not on file     Family History: non-contributory  Meds/Allergies   PTA meds:   Prior to Admission Medications   Prescriptions Last Dose Informant Patient Reported? Taking?   lisinopril (ZESTRIL) 10 mg tablet   Yes Yes   Sig: Take 10 mg by mouth daily   metFORMIN (GLUCOPHAGE) 1000 MG tablet   Yes Yes   Sig: Take 1,000 mg by mouth 2 (two) times a day with meals      Facility-Administered Medications: None     No Known Allergies    Review of previous medical records was  "completed.       OBJECTIVE     Patient ID: Aristoteles Toribio Reyes is a 58 y.o. male.    Vitals:   Vitals:    24 1340 24 1345 24 1434 24 1500   BP:  (!) 172/88 166/93 (!) 174/92   BP Location:    Right arm   Pulse: 70 73 82 79   Resp: (!) 25 (!) 27 22 22   Temp:       SpO2: 100% 97% 98% 100%   Weight:   58 kg (127 lb 13.9 oz)    Height:   5' 2\" (1.575 m)       Body mass index is 23.39 kg/m².     Intake/Output Summary (Last 24 hours) at 2024 1714  Last data filed at 2024 1644  Gross per 24 hour   Intake 244.84 ml   Output 200 ml   Net 44.84 ml       Temperature:   Temp (24hrs), Av.7 °F (36.5 °C), Min:97.7 °F (36.5 °C), Max:97.7 °F (36.5 °C)    Temperature: 97.7 °F (36.5 °C)    Invasive Devices:   Invasive Devices       Peripheral Intravenous Line  Duration             Peripheral IV 24 Left Antecubital <1 day    Peripheral IV 24 Right Antecubital <1 day                    Physical Exam  HENT:      Head: Normocephalic and atraumatic.   Eyes:      Extraocular Movements: EOM normal.      Pupils: Pupils are equal, round, and reactive to light.   Musculoskeletal:      Cervical back: Normal range of motion and neck supple.   Skin:     General: Skin is warm and dry.   Neurological:      Mental Status: He is alert and oriented to person, place, and time.      Motor: Motor strength is normal.     Coordination: Finger-Nose-Finger Test and Heel to Shin Test normal.      Deep Tendon Reflexes:      Reflex Scores:       Tricep reflexes are 2+ on the right side and 2+ on the left side.       Bicep reflexes are 2+ on the right side and 2+ on the left side.       Patellar reflexes are 2+ on the right side and 2+ on the left side.       Achilles reflexes are 2+ on the right side and 2+ on the left side.         Neurologic Exam     Mental Status   Oriented to person, place, and time.     Cranial Nerves     CN II   Visual fields full to confrontation.     CN III, IV, VI   Pupils are " equal, round, and reactive to light.  Extraocular motions are normal.     CN V   Facial sensation intact.     CN VII   Facial expression full, symmetric.     CN VIII   Hearing: intact    CN IX, X   Palate: symmetric    CN XII   Tongue deviation: none    Motor Exam     Strength   Strength 5/5 throughout.     Sensory Exam   Light touch normal.     Gait, Coordination, and Reflexes     Coordination   Finger to nose coordination: normal  Heel to shin coordination: normal    Reflexes   Right biceps: 2+  Left biceps: 2+  Right triceps: 2+  Left triceps: 2+  Right patellar: 2+  Left patellar: 2+  Right achilles: 2+  Left achilles: 2+  Right plantar: normal  Left plantar: normal         LABORATORY DATA     Labs: I have personally reviewed pertinent reports.    Results from last 7 days   Lab Units 09/02/24  1302 09/02/24  1006   WBC Thousand/uL 14.50* 10.17*   HEMOGLOBIN g/dL 11.8* 12.5   HEMATOCRIT % 34.1* 36.4*   PLATELETS Thousands/uL 301 311      Results from last 7 days   Lab Units 09/02/24  1006   POTASSIUM mmol/L 3.7   CHLORIDE mmol/L 107   CO2 mmol/L 20*   BUN mg/dL 18   CREATININE mg/dL 0.94   CALCIUM mg/dL 9.6              Results from last 7 days   Lab Units 09/02/24  1302 09/02/24  1006   INR  1.00 0.97   PTT seconds 29 29               IMAGING & DIAGNOSTIC TESTING     Radiology Results: I have personally reviewed pertinent reports.    CTA dissection protocol chest/abdomen/pelvis   Final Result by Konstantin Contreras MD (09/02 1098)      No acute findings in the chest, abdomen, or pelvis. Specifically, no findings of aortic aortic syndrome.                  Resident: HERON BEST I, the attending radiologist, have reviewed the images and agree with the final report above.      Workstation performed: [id number]         CTA stroke alert (head/neck)   Final Result by Masha Callaway MD (09/02 9285)      1.   Patent major vessels of the [place] of cason without high-grade stenosis.  No aneurysm.   2.   Atherosclerotic changes without hemodynamically significant stenosis in the cervical carotid or vertebral arteries.               I personally discussed this study with JAMES REDMOND on 9/2/2024 10: 41 AM.               Workstation performed: [id number]         CT stroke alert brain   Final Result by Masha Callaway MD (09/02 1229)   Addendum (preliminary) 1 of 1 by Masha Callaway MD (09/02 1229)   ADDENDUM:      There is nonspecific 5 mm calcification versus calcific lesion in the    right temporo-occipital junction (series 2 image 20). Recommend further    characterization with brain MRI without and with contrast if no prior    outside imaging is available.         This study was marked in EPIC for notification and follow-up.               Final      1.  No acute intracranial CT abnormality.   2.  Chronic appearing inferior left cerebellar infarct.                     I personally discussed this study with JAMES REDMOND on 9/2/2024 10:    41 AM.         Workstation performed: [id number]             Other Diagnostic Testing: I have personally reviewed pertinent reports.      ACTIVE MEDICATIONS     Current Facility-Administered Medications   Medication Dose Route Frequency    amLODIPine (NORVASC) tablet 5 mg  5 mg Oral Daily    [START ON 9/3/2024] aspirin (ECOTRIN LOW STRENGTH) EC tablet 81 mg  81 mg Oral Daily    atorvastatin (LIPITOR) tablet 40 mg  40 mg Oral Daily With Dinner    heparin (porcine) 25,000 units in 0.45% NaCl 250 mL infusion (premix)  3-20 Units/kg/hr (Order-Specific) Intravenous Titrated    heparin (porcine) injection 1,650 Units  1,650 Units Intravenous Q6H PRN    heparin (porcine) injection 3,300 Units  3,300 Units Intravenous Q6H PRN    hydrALAZINE (APRESOLINE) injection 10 mg  10 mg Intravenous Q6H PRN    insulin lispro (HumALOG/ADMELOG) 100 units/mL subcutaneous injection 1-5 Units  1-5 Units Subcutaneous TID AC    lisinopril (ZESTRIL) tablet 20 mg  20 mg Oral Daily    nitroglycerin  (NITROSTAT) SL tablet 0.4 mg  0.4 mg Sublingual Q5 Min PRN    ticagrelor (BRILINTA) tablet 90 mg  90 mg Oral Q12H KALYANI       Prior to Admission medications    Medication Sig Start Date End Date Taking? Authorizing Provider   lisinopril (ZESTRIL) 10 mg tablet Take 10 mg by mouth daily   Yes Historical Provider, MD   metFORMIN (GLUCOPHAGE) 1000 MG tablet Take 1,000 mg by mouth 2 (two) times a day with meals   Yes Historical Provider, MD       ======    Thank you for allowing me to participate in the care of your patient, Aristoteles Toribio Reyes.    Mimi Hodges MD

## 2024-09-02 NOTE — H&P
"[facility]  H&P  Name: Aristoteles Toribio Reyes 58 y.o. male I MRN: [id number]  Unit/Bed#: ED 20 I Date of Admission: 9/2/2024   Date of Service: 9/2/2024 I Hospital Day: 0      Assessment & Plan   Elevated troponin I level  Assessment & Plan  Looks like typical chest pain with elevated troponin  Cardiology recommended ER to start IV heparin  Patient is on aspirin 325 mg after the stroke, continue with the same dose and Lipitor 80 mg  Echocardiogram ordered    Accelerated hypertension  Assessment & Plan  Patient admitted with blood pressure more than 200 systolic  Admitted with retrosternal crushing chest pain since last night  EKG showed normal sinus rhythm  Patient was given IV NT hypertensives, sublingual nitro  Blood pressure later improved, troponin significantly elevated more than 500  Patient not sure about all of his medicines but he brought lisinopril, aspirin 325 mg and metformin 1 g twice daily after the stroke  Will resume lisinopril 40 mg      Leukocytosis: Currently patient denied any signs and symptoms of upper respiratory tract/lower respite tract, dysuric symptoms.  COVID-negative.  CTA chest did not show any PE/infiltrate.  Could be stress-induced, will check BNP, procalcitonin and send cultures will start empiric antibiotic if patient has spikes of fever    Type 2 diabetes mellitus, without long-term current use of insulin ([facility])  Assessment & Plan  No results found for: \"HGBA1C\"    Recent Labs     09/02/24  0944 09/02/24  1002   POCGLU 207* 201*       Blood Sugar Average: Last 72 hrs:  (P) 201  Will check glycohemoglobin  hold home dose of metformin  Insulin sliding scale    History of stroke:  Assessment & Plan  On CAT scan patient has old inferior cerebellar infarct  Does not have any focal logical deficit on exam  He ambulates without any assistance  Continue aspirin 325 mg  Start Lipitor 80 mg  Repeat CTA head and neck done in ER did not show any acute stroke         "     VTE Pharmacologic Prophylaxis:   Moderate Risk (Score 3-4) - Pharmacological DVT Prophylaxis Ordered: heparin drip.  Code Status: Level 1 - Full Code   Discussion with family: Updated  (wife and friend) at bedside.    Anticipated Length of Stay: Patient will be admitted on an inpatient basis with an anticipated length of stay of greater than 2 midnights secondary to accelerated hypertension, elevated troponin.    Total Time Spent on Date of Encounter in care of patient: 83 mins. This time was spent on one or more of the following: performing physical exam; counseling and coordination of care; obtaining or reviewing history; documenting in the medical record; reviewing/ordering tests, medications or procedures; communicating with other healthcare professionals and discussing with patient's family/caregivers.    Chief Complaint: Retrosternal chest pain since last night    History of Present Illness:  Aristoteles Toribio Reyes is a 58 y.o. male with a PMH of prior history of ischemic stroke, hypertension, type 2 diabetes mellitus admitted with evaluation of retrosternal localized chest pain started since last night.  He reported he started having chest pain last night but got better and that he slept.  After waking up in the morning he started having the same chest pain and he is brought to the hospital by his friend and wife.  Currently patient does not have any chest pain, shortness of breath or any focal logical deficit        Patient smokes marijuana but not alcohol.  He denied complaint of fever, sore throat, vomiting, diarrhea              Review of Systems:  Review of Systems all are negative as are mentioned above    Past Medical and Surgical History:   Past Medical History:   Diagnosis Date    History of cardioembolic cerebrovascular accident (CVA) 09/02/2024    History of cardioembolic cerebrovascular accident (CVA) 09/02/2024       No past surgical history on file.    Meds/Allergies:  Prior  to Admission medications    Not on File     I have reviewed home medications with patient personally.    Allergies: No Known Allergies    Social History:  Marital Status: /Civil Union   Patient Pre-hospital Living Situation: Home  Patient Pre-hospital Level of Mobility: walks  Patient Pre-hospital Diet Restrictions: none  Substance Use History:   Social History     Substance and Sexual Activity   Alcohol Use Not on file     Social History     Tobacco Use   Smoking Status Not on file   Smokeless Tobacco Not on file     Social History     Substance and Sexual Activity   Drug Use Not on file       Family History:  No family history on file.    Physical Exam:     Vitals:   Blood Pressure: (!) 172/94 (09/02/24 1115)  Pulse: 62 (09/02/24 1115)  Temperature: 97.7 °F (36.5 °C) (09/02/24 1000)  Respirations: 21 (09/02/24 1115)  Weight - Scale: 58 kg (127 lb 13.9 oz) (09/02/24 1008)  SpO2: 95 % (09/02/24 1115)    Constitutional: no Acute distress  HEENT: no Pallor or icterus  CVS: S1 plus S2  Respiratory: Normal vascular breathe without crackles and wheeze  Gastroenterology: Soft nontender without any palpable mass  Skin: No bruises or ecchymosis  Neurology: No focal logical deficit      Additional Data:     Lab Results:  Results from last 7 days   Lab Units 09/02/24  1302   WBC Thousand/uL 14.50*   HEMOGLOBIN g/dL 11.8*   HEMATOCRIT % 34.1*   PLATELETS Thousands/uL 301     Results from last 7 days   Lab Units 09/02/24  1006   SODIUM mmol/L 139   POTASSIUM mmol/L 3.7   CHLORIDE mmol/L 107   CO2 mmol/L 20*   BUN mg/dL 18   CREATININE mg/dL 0.94   ANION GAP mmol/L 12   CALCIUM mg/dL 9.6   GLUCOSE RANDOM mg/dL 210*     Results from last 7 days   Lab Units 09/02/24  1302   INR  1.00     Results from last 7 days   Lab Units 09/02/24  1002 09/02/24  0944   POC GLUCOSE mg/dl 201* 207*               Lines/Drains:  Invasive Devices       Peripheral Intravenous Line  Duration             Peripheral IV 09/02/24 Left Antecubital  <1 day    Peripheral IV 09/02/24 Right Antecubital <1 day                        Imaging: Personally reviewed the following imaging: chest CT scan  CTA dissection protocol chest/abdomen/pelvis   Final Result by Konstantin Contreras MD (09/02 1144)      No acute findings in the chest, abdomen, or pelvis. Specifically, no findings of aortic aortic syndrome.                  Resident: HERON BEST I, the attending radiologist, have reviewed the images and agree with the final report above.      Workstation performed: [id number]         CTA stroke alert (head/neck)   Final Result by Masha Callaway MD (09/02 1045)      1.   Patent major vessels of the [place] of cason without high-grade stenosis.  No aneurysm.   2.  Atherosclerotic changes without hemodynamically significant stenosis in the cervical carotid or vertebral arteries.               I personally discussed this study with JAMES REDMOND on 9/2/2024 10: 41 AM.               Workstation performed: [id number]         CT stroke alert brain   Final Result by Masha Callaway MD (09/02 1229)   Addendum (preliminary) 1 of 1 by Masha Callaway MD (09/02 1229)   ADDENDUM:      There is nonspecific 5 mm calcification versus calcific lesion in the    right temporo-occipital junction (series 2 image 20). Recommend further    characterization with brain MRI without and with contrast if no prior    outside imaging is available.         This study was marked in EPIC for notification and follow-up.               Final      1.  No acute intracranial CT abnormality.   2.  Chronic appearing inferior left cerebellar infarct.                     I personally discussed this study with JAMES REDMOND on 9/2/2024 10:    41 AM.         Workstation performed: [id number]             EKG and Other Studies Reviewed on Admission:   EKG: Personally Reviewed.    ** Please Note: This note has been constructed using a voice recognition system. **

## 2024-09-03 ENCOUNTER — APPOINTMENT (INPATIENT)
Dept: MRI IMAGING | Facility: HOSPITAL | Age: 59
DRG: 282 | End: 2024-09-03
Payer: COMMERCIAL

## 2024-09-03 PROBLEM — R07.9 CHEST PAIN: Status: ACTIVE | Noted: 2024-09-03

## 2024-09-03 PROBLEM — R29.90 STROKE-LIKE SYMPTOMS: Status: ACTIVE | Noted: 2024-09-03

## 2024-09-03 LAB
ALBUMIN SERPL BCG-MCNC: 4.1 G/DL (ref 3.5–5)
ALP SERPL-CCNC: 62 U/L (ref 34–104)
ALT SERPL W P-5'-P-CCNC: 14 U/L (ref 7–52)
ANION GAP SERPL CALCULATED.3IONS-SCNC: 10 MMOL/L (ref 4–13)
APTT PPP: 50 SECONDS (ref 23–34)
APTT PPP: 58 SECONDS (ref 23–34)
APTT PPP: 64 SECONDS (ref 23–34)
AST SERPL W P-5'-P-CCNC: 20 U/L (ref 13–39)
BILIRUB SERPL-MCNC: 0.58 MG/DL (ref 0.2–1)
BUN SERPL-MCNC: 16 MG/DL (ref 5–25)
CALCIUM SERPL-MCNC: 9.2 MG/DL (ref 8.4–10.2)
CHLORIDE SERPL-SCNC: 107 MMOL/L (ref 96–108)
CO2 SERPL-SCNC: 22 MMOL/L (ref 21–32)
CREAT SERPL-MCNC: 0.95 MG/DL (ref 0.6–1.3)
ERYTHROCYTE [DISTWIDTH] IN BLOOD BY AUTOMATED COUNT: 12.3 % (ref 11.6–15.1)
GFR SERPL CREATININE-BSD FRML MDRD: 87 ML/MIN/1.73SQ M
GLUCOSE SERPL-MCNC: 169 MG/DL (ref 65–140)
GLUCOSE SERPL-MCNC: 190 MG/DL (ref 65–140)
GLUCOSE SERPL-MCNC: 216 MG/DL (ref 65–140)
GLUCOSE SERPL-MCNC: 226 MG/DL (ref 65–140)
GLUCOSE SERPL-MCNC: 233 MG/DL (ref 65–140)
HCT VFR BLD AUTO: 35.4 % (ref 36.5–49.3)
HGB BLD-MCNC: 12.3 G/DL (ref 12–17)
MAGNESIUM SERPL-MCNC: 1.4 MG/DL (ref 1.9–2.7)
MCH RBC QN AUTO: 30.4 PG (ref 26.8–34.3)
MCHC RBC AUTO-ENTMCNC: 34.7 G/DL (ref 31.4–37.4)
MCV RBC AUTO: 87 FL (ref 82–98)
PHOSPHATE SERPL-MCNC: 2.9 MG/DL (ref 2.7–4.5)
PLATELET # BLD AUTO: 289 THOUSANDS/UL (ref 149–390)
PMV BLD AUTO: 10.7 FL (ref 8.9–12.7)
POTASSIUM SERPL-SCNC: 3.7 MMOL/L (ref 3.5–5.3)
PROT SERPL-MCNC: 6.5 G/DL (ref 6.4–8.4)
RBC # BLD AUTO: 4.05 MILLION/UL (ref 3.88–5.62)
SODIUM SERPL-SCNC: 139 MMOL/L (ref 135–147)
WBC # BLD AUTO: 10.03 THOUSAND/UL (ref 4.31–10.16)

## 2024-09-03 PROCEDURE — 99232 SBSQ HOSP IP/OBS MODERATE 35: CPT

## 2024-09-03 PROCEDURE — 82948 REAGENT STRIP/BLOOD GLUCOSE: CPT

## 2024-09-03 PROCEDURE — 99232 SBSQ HOSP IP/OBS MODERATE 35: CPT | Performed by: INTERNAL MEDICINE

## 2024-09-03 PROCEDURE — 70551 MRI BRAIN STEM W/O DYE: CPT

## 2024-09-03 PROCEDURE — 84100 ASSAY OF PHOSPHORUS: CPT | Performed by: STUDENT IN AN ORGANIZED HEALTH CARE EDUCATION/TRAINING PROGRAM

## 2024-09-03 PROCEDURE — 83735 ASSAY OF MAGNESIUM: CPT | Performed by: STUDENT IN AN ORGANIZED HEALTH CARE EDUCATION/TRAINING PROGRAM

## 2024-09-03 PROCEDURE — 80053 COMPREHEN METABOLIC PANEL: CPT | Performed by: STUDENT IN AN ORGANIZED HEALTH CARE EDUCATION/TRAINING PROGRAM

## 2024-09-03 PROCEDURE — 85027 COMPLETE CBC AUTOMATED: CPT | Performed by: STUDENT IN AN ORGANIZED HEALTH CARE EDUCATION/TRAINING PROGRAM

## 2024-09-03 PROCEDURE — 85730 THROMBOPLASTIN TIME PARTIAL: CPT | Performed by: STUDENT IN AN ORGANIZED HEALTH CARE EDUCATION/TRAINING PROGRAM

## 2024-09-03 PROCEDURE — 85730 THROMBOPLASTIN TIME PARTIAL: CPT | Performed by: INTERNAL MEDICINE

## 2024-09-03 RX ADMIN — LISINOPRIL 20 MG: 20 TABLET ORAL at 08:57

## 2024-09-03 RX ADMIN — ASPIRIN 81 MG: 81 TABLET, COATED ORAL at 08:57

## 2024-09-03 RX ADMIN — INSULIN LISPRO 1 UNITS: 100 INJECTION, SOLUTION INTRAVENOUS; SUBCUTANEOUS at 15:44

## 2024-09-03 RX ADMIN — HEPARIN SODIUM 1650 UNITS: 1000 INJECTION INTRAVENOUS; SUBCUTANEOUS at 05:02

## 2024-09-03 RX ADMIN — HEPARIN SODIUM 1650 UNITS: 1000 INJECTION INTRAVENOUS; SUBCUTANEOUS at 20:02

## 2024-09-03 RX ADMIN — AMLODIPINE BESYLATE 5 MG: 5 TABLET ORAL at 08:57

## 2024-09-03 RX ADMIN — HEPARIN SODIUM 18 UNITS/KG/HR: 10000 INJECTION, SOLUTION INTRAVENOUS at 15:45

## 2024-09-03 RX ADMIN — ATORVASTATIN CALCIUM 40 MG: 40 TABLET, FILM COATED ORAL at 15:43

## 2024-09-03 NOTE — ASSESSMENT & PLAN NOTE
Lab Results   Component Value Date    HGBA1C 8.4 (H) 09/02/2024       Recent Labs     09/02/24  1002 09/02/24  1551 09/03/24  0615 09/03/24  1036   POCGLU 201* 175* 233* 190*       Blood Sugar Average: Last 72 hrs:  (P) 199.75  Will check glycohemoglobin  hold home dose of metformin  Insulin sliding scale

## 2024-09-03 NOTE — ASSESSMENT & PLAN NOTE
On CAT scan patient has old inferior cerebellar infarct  Does not have any focal logical deficit on exam  He ambulates without any assistance  Continue aspirin 325 mg  Start Lipitor 80 mg  Repeat CTA head and neck no evidence of acute infarct

## 2024-09-03 NOTE — ASSESSMENT & PLAN NOTE
Lab Results   Component Value Date    HGBA1C 8.4 (H) 09/02/2024       Recent Labs     09/02/24  1551 09/03/24  0615 09/03/24  1036 09/03/24  1514   POCGLU 175* 233* 190* 169*       Blood Sugar Average: Last 72 hrs:  (P) 193.6

## 2024-09-03 NOTE — ASSESSMENT & PLAN NOTE
High-sensitivity troponin 517/2088/2187  Plan for further evaluation with cardiac catheterization tomorrow  Risks, benefits, and alternatives of cardiac catheterization including but not limited to risk of infection, vascular injury, renal failure, bleeding, myocardial infarction, stroke, and death were discussed at length with patient. Patient understands the risks and benefits and wishes to proceed.   Continue heparin infusion, aspirin, and atorvastatin.

## 2024-09-03 NOTE — PLAN OF CARE
Problem: PAIN - ADULT  Goal: Verbalizes/displays adequate comfort level or baseline comfort level  Description: Interventions:  - Encourage patient to monitor pain and request assistance  - Assess pain using appropriate pain scale  - Administer analgesics based on type and severity of pain and evaluate response  - Implement non-pharmacological measures as appropriate and evaluate response  - Consider cultural and social influences on pain and pain management  - Notify physician/advanced practitioner if interventions unsuccessful or patient reports new pain  Outcome: Progressing     Problem: INFECTION - ADULT  Goal: Absence or prevention of progression during hospitalization  Description: INTERVENTIONS:  - Assess and monitor for signs and symptoms of infection  - Monitor lab/diagnostic results  - Monitor all insertion sites, i.e. indwelling lines, tubes, and drains  - Monitor endotracheal if appropriate and nasal secretions for changes in amount and color  - [place] appropriate cooling/warming therapies per order  - Administer medications as ordered  - Instruct and encourage patient and family to use good hand hygiene technique  - Identify and instruct in appropriate isolation precautions for identified infection/condition  Outcome: Progressing  Goal: Absence of fever/infection during neutropenic period  Description: INTERVENTIONS:  - Monitor WBC    Outcome: Progressing     Problem: SAFETY ADULT  Goal: Patient will remain free of falls  Description: INTERVENTIONS:  - Educate patient/family on patient safety including physical limitations  - Instruct patient to call for assistance with activity   - Consult OT/PT to assist with strengthening/mobility   - Keep Call bell within reach  - Keep bed low and locked with side rails adjusted as appropriate  - Keep care items and personal belongings within reach  - Initiate and maintain comfort rounds  - Make Fall Risk Sign visible to staff  - Offer Toileting every 2 Hours,  in advance of need  - Initiate/Maintain bed alarm  - Obtain necessary fall risk management equipment: fall risk bracelet, yellow socks, alarm  - Apply yellow socks and bracelet for high fall risk patients  - Consider moving patient to room near nurses station  Outcome: Progressing  Goal: Maintain or return to baseline ADL function  Description: INTERVENTIONS:  -  Assess patient's ability to carry out ADLs; assess patient's baseline for ADL function and identify physical deficits which impact ability to perform ADLs (bathing, care of mouth/teeth, toileting, grooming, dressing, etc.)  - Assess/evaluate cause of self-care deficits   - Assess range of motion  - Assess patient's mobility; develop plan if impaired  - Assess patient's need for assistive devices and provide as appropriate  - Encourage maximum independence but intervene and supervise when necessary  - Involve family in performance of ADLs  - Assess for home care needs following discharge   - Consider OT consult to assist with ADL evaluation and planning for discharge  - Provide patient education as appropriate  Outcome: Progressing  Goal: Maintains/Returns to pre admission functional level  Description: INTERVENTIONS:  - Perform AM-PAC 6 Click Basic Mobility/ Daily Activity assessment daily.  - Set and communicate daily mobility goal to care team and patient/family/caregiver.   - Collaborate with rehabilitation services on mobility goals if consulted  - Perform Range of Motion 2 times a day.  - Reposition patient every 2 hours.  - Dangle patient 2 times a day  - Stand patient 2 times a day  - Ambulate patient 2 times a day  - Out of bed to chair 2 times a day   - Out of bed for meals 2 times a day  - Out of bed for toileting  - Record patient progress and toleration of activity level   Outcome: Progressing     Problem: DISCHARGE PLANNING  Goal: Discharge to home or other facility with appropriate resources  Description: INTERVENTIONS:  - Identify barriers to  discharge w/patient and caregiver  - Arrange for needed discharge resources and transportation as appropriate  - Identify discharge learning needs (meds, wound care, etc.)  - Arrange for interpretive services to assist at discharge as needed  - Refer to Case Management Department for coordinating discharge planning if the patient needs post-hospital services based on physician/advanced practitioner order or complex needs related to functional status, cognitive ability, or social support system  Outcome: Progressing     Problem: Knowledge Deficit  Goal: Patient/family/caregiver demonstrates understanding of disease process, treatment plan, medications, and discharge instructions  Description: Complete learning assessment and assess knowledge base.  Interventions:  - Provide teaching at level of understanding  - Provide teaching via preferred learning methods  Outcome: Progressing     Problem: CARDIOVASCULAR - ADULT  Goal: Maintains optimal cardiac output and hemodynamic stability  Description: INTERVENTIONS:  - Monitor I/O, vital signs and rhythm  - Monitor for S/S and trends of decreased cardiac output  - Administer and titrate ordered vasoactive medications to optimize hemodynamic stability  - Assess quality of pulses, skin color and temperature  - Assess for signs of decreased coronary artery perfusion  - Instruct patient to report change in severity of symptoms  Outcome: Progressing  Goal: Absence of cardiac dysrhythmias or at baseline rhythm  Description: INTERVENTIONS:  - Continuous cardiac monitoring, vital signs, obtain 12 lead EKG if ordered  - Administer antiarrhythmic and heart rate control medications as ordered  - Monitor electrolytes and administer replacement therapy as ordered  Outcome: Progressing

## 2024-09-03 NOTE — CASE MANAGEMENT
Case Management Progress Note    Patient name Aristoteles Toribio Reyes  Location /-01 MRN [id number]  : 1965 Date 9/3/2024       LOS (days): 1  Geometric Mean LOS (GMLOS) (days): 2.6  Days to GMLOS:1.5        OBJECTIVE:        Current admission status: Inpatient  Preferred Pharmacy:   [facility]  [place] [address] [address] [place] [address] [address] [facility] [address]  Phone: [phone] Fax: [phone]    Primary Care Provider: No primary care provider on file.    Primary Insurance: [employer]  Secondary Insurance:     PROGRESS NOTE:  CM attempted to complete open however pt is off the floor. As per SLIM rounds, The patient will continue to require additional inpatient hospital stay due to awaiting MRI and cardiac catheterization for further neurologic and cardiac eval. Pt is anticipated for dc tomorrow.

## 2024-09-03 NOTE — PROGRESS NOTES
[facility]  Progress Note  Name: Aristoteles Toribio Reyes I  MRN: [id number]  Unit/Bed#: -01 I Date of Admission: 9/2/2024   Date of Service: 9/3/2024 I Hospital Day: 1    Assessment & Plan   * Stroke-like symptoms  Assessment & Plan  Patient presented with chest pain and numbness and tingling in right upper and lower extremities.  (Since resolved)    Neurology recommended admission for stroke pathway  CT head showed no acute pathology  Recommending aspirin load, baby aspirin  Recommend MRI brain w/wo for TIA/stroke w/u and further evaluation of R temporo-occipital lesion  Atorvastatin 40mg  TTE   Telemetry  A1C 8.4  PT/OT/ST      Chest pain  Assessment & Plan  Presented with substernal chest pain unresolved with nitro  Troponins elevated 0-hour 517, 2-hour 2088, 4-hour 2187  Cardiology- IV heparin, need cardiac cath, need neurologic clearance with MRI prior to cardiac catheterization  Patient is on aspirin 325 mg after the stroke, continue with the same dose and Lipitor 80 mg  Echocardiogram ordered    Type 2 diabetes mellitus, without long-term current use of insulin ([facility])  Assessment & Plan  Lab Results   Component Value Date    HGBA1C 8.4 (H) 09/02/2024       Recent Labs     09/02/24  1002 09/02/24  1551 09/03/24  0615 09/03/24  1036   POCGLU 201* 175* 233* 190*       Blood Sugar Average: Last 72 hrs:  (P) 199.75  Will check glycohemoglobin  hold home dose of metformin  Insulin sliding scale    History of stroke in prior 3 months  Assessment & Plan  On CAT scan patient has old inferior cerebellar infarct  Does not have any focal logical deficit on exam  He ambulates without any assistance  Continue aspirin 325 mg  Start Lipitor 80 mg  Repeat CTA head and neck no evidence of acute infarct      Accelerated hypertension  Assessment & Plan  Patient admitted with blood pressure more than 200 systolic  Admitted with retrosternal crushing chest pain since last night  EKG showed normal  sinus rhythm  Patient was given IV NT hypertensives, sublingual nitro  Blood pressure later improved, troponin significantly elevated more than 500  Patient not sure about all of his medicines but he brought lisinopril, aspirin 325 mg   Will resume lisinopril 40 mg           VTE Pharmacologic Prophylaxis:   High Risk (Score >/= 5) - Pharmacological DVT Prophylaxis Ordered: heparin drip. Sequential Compression Devices Ordered.    Mobility:   Basic Mobility Inpatient Raw Score: 19  JH-HLM Goal: 6: Walk 10 steps or more  JH-HLM Achieved: 7: Walk 25 feet or more  JH-HLM Goal achieved. Continue to encourage appropriate mobility.    Patient Centered Rounds: I performed bedside rounds with nursing staff today.   Discussions with Specialists or Other Care Team Provider: CM, neurology, cardiology    Education and Discussions with Family / Patient: Updated  (wife) at bedside.    Total Time Spent on Date of Encounter in care of patient: 75 mins. This time was spent on one or more of the following: performing physical exam; counseling and coordination of care; obtaining or reviewing history; documenting in the medical record; reviewing/ordering tests, medications or procedures; communicating with other healthcare professionals and discussing with patient's family/caregivers.    Current Length of Stay: 1 day(s)  Current Patient Status: Inpatient   Certification Statement: The patient will continue to require additional inpatient hospital stay due to awaiting MRI and cardiac catheterization for further neurologic and cardiac eval  Discharge Plan: Anticipate discharge tomorrow to home.    Code Status: Level 1 - Full Code    Subjective:   Patient reports to be feeling well.  Currently denies any chest pain/pressure, palpitations minus, nausea, shortness breath, or chills.    Objective:     Vitals:   Temp (24hrs), Av.2 °F (36.8 °C), Min:97.8 °F (36.6 °C), Max:98.4 °F (36.9 °C)    Temp:  [97.8 °F (36.6 °C)-98.4 °F  (36.9 °C)] 98.3 °F (36.8 °C)  HR:  [61-86] 79  Resp:  [13-22] 18  BP: (106-174)/(61-93) 131/75  SpO2:  [92 %-100 %] 100 %  Body mass index is 23.39 kg/m².     Input and Output Summary (last 24 hours):     Intake/Output Summary (Last 24 hours) at 9/3/2024 1404  Last data filed at 9/2/2024 1857  Gross per 24 hour   Intake 276.19 ml   Output 200 ml   Net 76.19 ml       Physical Exam:   Physical Exam  Vitals and nursing note reviewed.   Constitutional:       Appearance: He is normal weight.   HENT:      Head: Normocephalic.      Nose: Nose normal.      Mouth/Throat:      Mouth: Mucous membranes are moist.      Pharynx: Oropharynx is clear.   Eyes:      General: No scleral icterus.     Conjunctiva/sclera: Conjunctivae normal.      Pupils: Pupils are equal, round, and reactive to light.   Cardiovascular:      Rate and Rhythm: Normal rate and regular rhythm.      Heart sounds: No murmur heard.     No friction rub. No gallop.   Pulmonary:      Effort: Pulmonary effort is normal. No respiratory distress.      Breath sounds: Normal breath sounds. No stridor. No wheezing, rhonchi or rales.   Abdominal:      General: Abdomen is flat.      Palpations: Abdomen is soft.   Musculoskeletal:         General: Normal range of motion.      Cervical back: Normal range of motion and neck supple.      Right lower leg: No edema.      Left lower leg: No edema.   Lymphadenopathy:      Cervical: No cervical adenopathy.   Skin:     General: Skin is warm.      Coloration: Skin is not jaundiced or pale.      Findings: No bruising, erythema or lesion.   Neurological:      General: No focal deficit present.      Mental Status: He is alert and oriented to person, place, and time. Mental status is at baseline.      Cranial Nerves: No cranial nerve deficit.      Motor: No weakness.   Psychiatric:         Mood and Affect: Mood normal.         Behavior: Behavior normal.         Thought Content: Thought content normal.          Additional Data:      Labs:  Results from last 7 days   Lab Units 09/03/24  0352   WBC Thousand/uL 10.03   HEMOGLOBIN g/dL 12.3   HEMATOCRIT % 35.4*   PLATELETS Thousands/uL 289     Results from last 7 days   Lab Units 09/03/24  0352   SODIUM mmol/L 139   POTASSIUM mmol/L 3.7   CHLORIDE mmol/L 107   CO2 mmol/L 22   BUN mg/dL 16   CREATININE mg/dL 0.95   ANION GAP mmol/L 10   CALCIUM mg/dL 9.2   ALBUMIN g/dL 4.1   TOTAL BILIRUBIN mg/dL 0.58   ALK PHOS U/L 62   ALT U/L 14   AST U/L 20   GLUCOSE RANDOM mg/dL 216*     Results from last 7 days   Lab Units 09/02/24  1302   INR  1.00     Results from last 7 days   Lab Units 09/03/24  1036 09/03/24  0615 09/02/24  1551 09/02/24  1002 09/02/24  0944   POC GLUCOSE mg/dl 190* 233* 175* 201* 207*     Results from last 7 days   Lab Units 09/02/24  1006   HEMOGLOBIN A1C % 8.4*     Results from last 7 days   Lab Units 09/02/24  1006   PROCALCITONIN ng/ml <0.05       Lines/Drains:  Invasive Devices       Peripheral Intravenous Line  Duration             Peripheral IV 09/02/24 Left Antecubital 1 day    Peripheral IV 09/02/24 Right Antecubital 1 day                      Telemetry:  Telemetry Orders (From admission, onward)               24 Hour Telemetry Monitoring  Continuous x 24 Hours (Telem)        Expiring   Question:  Reason for 24 Hour Telemetry  Answer:  PCI/EP study (including pacer and ICD implementation), Cardiac surgery, MI, abnormal cardiac cath, and chest pain- rule out MI                     Telemetry Reviewed: Normal Sinus Rhythm  Indication for Continued Telemetry Use: Acute CVA             Imaging: No pertinent imaging reviewed.    Recent Cultures (last 7 days):         Last 24 Hours Medication List:   Current Facility-Administered Medications   Medication Dose Route Frequency Provider Last Rate    [Transfer Hold] amLODIPine  5 mg Oral Daily Ayad Noriega MD      [Transfer Hold] aspirin  81 mg Oral Daily Ayad Noriega MD      [Transfer Hold] atorvastatin  40 mg Oral Daily With Dinner  Ayad Noriega MD      heparin (porcine)  3-20 Units/kg/hr (Order-Specific) Intravenous Titrated Tiffaniekimber Theodore DO 18 Units/kg/hr (09/03/24 0708)    [Transfer Hold] heparin (porcine)  1,650 Units Intravenous Q6H PRN Tiffanie Theodore DO      [Transfer Hold] heparin (porcine)  3,300 Units Intravenous Q6H PRN Tiffanie Theodore DO      [Transfer Hold] hydrALAZINE  10 mg Intravenous Q6H PRN Ayad Noriega MD      [Transfer Hold] insulin lispro  1-5 Units Subcutaneous TID AC Ayad Noriega MD      [Transfer Hold] lisinopril  20 mg Oral Daily Ayad Noriega MD      [Transfer Hold] nitroglycerin  0.4 mg Sublingual Q5 Min PRN Jack Baptiste MD          Today, Patient Was Seen By: Jose Whipple PA-C    **Please Note: This note may have been constructed using a voice recognition system.**

## 2024-09-03 NOTE — ASSESSMENT & PLAN NOTE
Patient presented with chest pain and numbness and tingling in right upper and lower extremities.  (Since resolved)    Neurology recommended admission for stroke pathway  CT head showed no acute pathology  Recommending aspirin load, baby aspirin  Recommend MRI brain w/wo for TIA/stroke w/u and further evaluation of R temporo-occipital lesion  Atorvastatin 40mg  TTE   Telemetry  A1C 8.4  PT/OT/ST

## 2024-09-03 NOTE — QUICK NOTE
Patient to undergo cardiac catheterization after MRI completed.  Consent obtained for cardiac catheterization.  Risks, benefits, and alternatives of cardiac catheterization including but not limited to risk of infection, vascular injury, renal failure, bleeding, myocardial infarction, stroke, and death were discussed at length with patient. Patient understands the risks and benefits and wishes to proceed.    [id number] on iPad used during this encounter.

## 2024-09-03 NOTE — PROGRESS NOTES
"Cardiology Progress Note - Aristoteles Toribio Reyes 58 y.o. male MRN: [id number]    Unit/Bed#: -[facility] Encounter: [id number]      Assessment/Plan:  Assessment & Plan  Stroke-like symptoms  No acute event found on CT scan  Patient had MRI this afternoon which was negative for acute CVA.  Accelerated hypertension  Continue amlodipine, lisinopril  Elevated troponin I level  High-sensitivity troponin 517/2088/2187  Plan for further evaluation with cardiac catheterization tomorrow  Risks, benefits, and alternatives of cardiac catheterization including but not limited to risk of infection, vascular injury, renal failure, bleeding, myocardial infarction, stroke, and death were discussed at length with patient. Patient understands the risks and benefits and wishes to proceed.   Continue heparin infusion, aspirin, and atorvastatin.    Type 2 diabetes mellitus, without long-term current use of insulin ([facility])  Lab Results   Component Value Date    HGBA1C 8.4 (H) 09/02/2024       Recent Labs     09/02/24  1551 09/03/24  0615 09/03/24  1036 09/03/24  1514   POCGLU 175* 233* 190* 169*       Blood Sugar Average: Last 72 hrs:  (P) 193.6    Chest pain  See plan above      Subjective:   Patient seen and examined.  No significant events overnight.  Patient denies chest pain or anginal equivalent this time.    Objective:     Vitals: Blood pressure 110/63, pulse 100, temperature 98.4 °F (36.9 °C), temperature source Oral, resp. rate 17, height 5' 2\" (1.575 m), weight 58 kg (127 lb 13.9 oz), SpO2 98%., Body mass index is 23.39 kg/m².,   Orthostatic Blood Pressures      Flowsheet Row Most Recent Value   Blood Pressure 110/63 filed at 09/03/2024 1528   Patient Position - Orthostatic VS Lying filed at 09/03/2024 1528              Intake/Output Summary (Last 24 hours) at 9/3/2024 1726  Last data filed at 9/2/2024 1857  Gross per 24 hour   Intake 31.35 ml   Output --   Net 31.35 ml         Physical Exam:  Physical Exam  Vitals and nursing " note reviewed.   Constitutional:       General: He is not in acute distress.     Appearance: He is well-developed.   HENT:      Head: Normocephalic and atraumatic.   Eyes:      Conjunctiva/sclera: Conjunctivae normal.   Neck:      Vascular: No carotid bruit.   Cardiovascular:      Rate and Rhythm: Normal rate and regular rhythm.      Heart sounds: Normal heart sounds. No murmur heard.  Pulmonary:      Effort: Pulmonary effort is normal. No respiratory distress.      Breath sounds: Normal breath sounds.   Abdominal:      Palpations: Abdomen is soft.      Tenderness: There is no abdominal tenderness.   Musculoskeletal:         General: No swelling.      Cervical back: Neck supple.      Right lower leg: No edema.      Left lower leg: No edema.   Skin:     General: Skin is warm and dry.      Capillary Refill: Capillary refill takes less than 2 seconds.   Neurological:      Mental Status: He is alert and oriented to person, place, and time.   Psychiatric:         Mood and Affect: Mood normal.              Medications:      Current Facility-Administered Medications:     amLODIPine (NORVASC) tablet 5 mg, 5 mg, Oral, Daily, DU SpearNP, 5 mg at 09/03/24 0857    aspirin (ECOTRIN LOW STRENGTH) EC tablet 81 mg, 81 mg, Oral, Daily, Aileen Carol Saenz, CRNP, 81 mg at 09/03/24 0857    atorvastatin (LIPITOR) tablet 40 mg, 40 mg, Oral, Daily With Dinner, Aileen Carol Saenz, CRNP, 40 mg at 09/03/24 1543    heparin (porcine) 25,000 units in 0.45% NaCl 250 mL infusion (premix), 3-20 Units/kg/hr (Order-Specific), Intravenous, Titrated, Tiffanie Theodore DO, Last Rate: 9.9 mL/hr at 09/03/24 1545, 18 Units/kg/hr at 09/03/24 1545    heparin (porcine) injection 1,650 Units, 1,650 Units, Intravenous, Q6H PRN, DU SpearNP, 1,650 Units at 09/03/24 0502    heparin (porcine) injection 3,300 Units, 3,300 Units, Intravenous, Q6H PRN, AileenDU ParsonNP, 3,300 Units at 09/02/24 2119    hydrALAZINE (APRESOLINE) injection 10 mg,  "10 mg, Intravenous, Q6H PRN, SRINIVAS Spear    insulin lispro (HumALOG/ADMELOG) 100 units/mL subcutaneous injection 1-5 Units, 1-5 Units, Subcutaneous, TID AC, 1 Units at 09/03/24 1544 **AND** Fingerstick Glucose (POCT), , , TID AC, SRINIVAS Spear    lisinopril (ZESTRIL) tablet 20 mg, 20 mg, Oral, Daily, SRINIVAS Spear, 20 mg at 09/03/24 0857    nitroglycerin (NITROSTAT) SL tablet 0.4 mg, 0.4 mg, Sublingual, Q5 Min PRN, SRINIVAS Spear, 0.4 mg at 09/02/24 1432     Labs & Results:     Results from last 7 days   Lab Units 09/02/24  1458 09/02/24  1302 09/02/24  1006   HS TNI 0HR ng/L  --   --  517*   HS TNI 2HR ng/L  --  2,088*  --    HSTNI D2 ng/L  --  1,571*  --    HS TNI 4HR ng/L 2,187*  --   --    HSTNI D4 ng/L 1,670*  --   --      Results from last 7 days   Lab Units 09/03/24  0352 09/02/24  1302 09/02/24  1006   WBC Thousand/uL 10.03 14.50* 10.17*   HEMOGLOBIN g/dL 12.3 11.8* 12.5   HEMATOCRIT % 35.4* 34.1* 36.4*   PLATELETS Thousands/uL 289 301 311     Results from last 7 days   Lab Units 09/02/24  1006   TRIGLYCERIDES mg/dL 185*   HDL mg/dL 49     Results from last 7 days   Lab Units 09/03/24  0352 09/02/24  1006   POTASSIUM mmol/L 3.7 3.7   CHLORIDE mmol/L 107 107   CO2 mmol/L 22 20*   BUN mg/dL 16 18   CREATININE mg/dL 0.95 0.94   CALCIUM mg/dL 9.2 9.6   ALK PHOS U/L 62  --    ALT U/L 14  --    AST U/L 20  --      Results from last 7 days   Lab Units 09/03/24  1102 09/03/24  0343 09/02/24  1944 09/02/24  1302 09/02/24  1006   INR   --   --   --  1.00 0.97   PTT seconds 64* 58* 41* 29 29     Results from last 7 days   Lab Units 09/03/24  0352   MAGNESIUM mg/dL 1.4*       Vitals: Blood pressure 110/63, pulse 100, temperature 98.4 °F (36.9 °C), temperature source Oral, resp. rate 17, height 5' 2\" (1.575 m), weight 58 kg (127 lb 13.9 oz), SpO2 98%., Body mass index is 23.39 kg/m².,   Orthostatic Blood Pressures      Flowsheet Row Most Recent Value   Blood Pressure 110/63 filed at " 2024 1528   Patient Position - Orthostatic VS Lying filed at 2024 1528            Systolic (24hrs), Av , Min:106 , Max:133     Diastolic (24hrs), Av, Min:61, Max:79        Intake/Output Summary (Last 24 hours) at 9/3/2024 1726  Last data filed at 2024 1857  Gross per 24 hour   Intake 31.35 ml   Output --   Net 31.35 ml       Invasive Devices       Peripheral Intravenous Line  Duration             Peripheral IV 24 Left Antecubital 1 day    Peripheral IV 24 Right Antecubital 1 day                        Telemetry:  Telemetry Orders (From admission, onward)               24 Hour Telemetry Monitoring  Continuous x 24 Hours (Telem)           Question:  Reason for 24 Hour Telemetry  Answer:  PCI/EP study (including pacer and ICD implementation), Cardiac surgery, MI, abnormal cardiac cath, and chest pain- rule out MI                         BP Readings from Last 3 Encounters:   24 110/63   24 139/99      Wt Readings from Last 3 Encounters:   24 58 kg (127 lb 13.9 oz)   24 60.8 kg (134 lb)

## 2024-09-03 NOTE — ASSESSMENT & PLAN NOTE
Presented with substernal chest pain unresolved with nitro  Troponins elevated 0-hour 517, 2-hour 2088, 4-hour 2187  Cardiology- IV heparin, need cardiac cath, need neurologic clearance with MRI prior to cardiac catheterization  Patient is on aspirin 325 mg after the stroke, continue with the same dose and Lipitor 80 mg  Echocardiogram ordered

## 2024-09-03 NOTE — PLAN OF CARE
Problem: PAIN - ADULT  Goal: Verbalizes/displays adequate comfort level or baseline comfort level  Description: Interventions:  - Encourage patient to monitor pain and request assistance  - Assess pain using appropriate pain scale  - Administer analgesics based on type and severity of pain and evaluate response  - Implement non-pharmacological measures as appropriate and evaluate response  - Consider cultural and social influences on pain and pain management  - Notify physician/advanced practitioner if interventions unsuccessful or patient reports new pain  Outcome: Progressing     Problem: INFECTION - ADULT  Goal: Absence or prevention of progression during hospitalization  Description: INTERVENTIONS:  - Assess and monitor for signs and symptoms of infection  - Monitor lab/diagnostic results  - Monitor all insertion sites, i.e. indwelling lines, tubes, and drains  - Monitor endotracheal if appropriate and nasal secretions for changes in amount and color  - [place] appropriate cooling/warming therapies per order  - Administer medications as ordered  - Instruct and encourage patient and family to use good hand hygiene technique  - Identify and instruct in appropriate isolation precautions for identified infection/condition  Outcome: Progressing  Goal: Absence of fever/infection during neutropenic period  Description: INTERVENTIONS:  - Monitor WBC    Outcome: Progressing     Problem: SAFETY ADULT  Goal: Patient will remain free of falls  Description: INTERVENTIONS:  - Educate patient/family on patient safety including physical limitations  - Instruct patient to call for assistance with activity   - Consult OT/PT to assist with strengthening/mobility   - Keep Call bell within reach  - Keep bed low and locked with side rails adjusted as appropriate  - Keep care items and personal belongings within reach  - Initiate and maintain comfort rounds  - Make Fall Risk Sign visible to staff  - Offer Toileting every  Hours,  in advance of need  - Initiate/Maintain alarm  - Obtain necessary fall risk management equipment:   - Apply yellow socks and bracelet for high fall risk patients  - Consider moving patient to room near nurses station  Outcome: Progressing  Goal: Maintain or return to baseline ADL function  Description: INTERVENTIONS:  -  Assess patient's ability to carry out ADLs; assess patient's baseline for ADL function and identify physical deficits which impact ability to perform ADLs (bathing, care of mouth/teeth, toileting, grooming, dressing, etc.)  - Assess/evaluate cause of self-care deficits   - Assess range of motion  - Assess patient's mobility; develop plan if impaired  - Assess patient's need for assistive devices and provide as appropriate  - Encourage maximum independence but intervene and supervise when necessary  - Involve family in performance of ADLs  - Assess for home care needs following discharge   - Consider OT consult to assist with ADL evaluation and planning for discharge  - Provide patient education as appropriate  Outcome: Progressing  Goal: Maintains/Returns to pre admission functional level  Description: INTERVENTIONS:  - Perform AM-PAC 6 Click Basic Mobility/ Daily Activity assessment daily.  - Set and communicate daily mobility goal to care team and patient/family/caregiver.   - Collaborate with rehabilitation services on mobility goals if consulted  - Perform Range of Motion  times a day.  - Reposition patient every  hours.  - Dangle patient  times a day  - Stand patient  times a day  - Ambulate patient  times a day  - Out of bed to chair  times a day   - Out of bed for meals times a day  - Out of bed for toileting  - Record patient progress and toleration of activity level   Outcome: Progressing     Problem: DISCHARGE PLANNING  Goal: Discharge to home or other facility with appropriate resources  Description: INTERVENTIONS:  - Identify barriers to discharge w/patient and caregiver  - Arrange for  needed discharge resources and transportation as appropriate  - Identify discharge learning needs (meds, wound care, etc.)  - Arrange for interpretive services to assist at discharge as needed  - Refer to Case Management Department for coordinating discharge planning if the patient needs post-hospital services based on physician/advanced practitioner order or complex needs related to functional status, cognitive ability, or social support system  Outcome: Progressing     Problem: Knowledge Deficit  Goal: Patient/family/caregiver demonstrates understanding of disease process, treatment plan, medications, and discharge instructions  Description: Complete learning assessment and assess knowledge base.  Interventions:  - Provide teaching at level of understanding  - Provide teaching via preferred learning methods  Outcome: Progressing

## 2024-09-03 NOTE — ASSESSMENT & PLAN NOTE
Patient admitted with blood pressure more than 200 systolic  Admitted with retrosternal crushing chest pain since last night  EKG showed normal sinus rhythm  Patient was given IV NT hypertensives, sublingual nitro  Blood pressure later improved, troponin significantly elevated more than 500  Patient not sure about all of his medicines but he brought lisinopril, aspirin 325 mg   Will resume lisinopril 40 mg

## 2024-09-03 NOTE — UTILIZATION REVIEW
Initial Clinical Review    Admission: Date/Time/Statement:   Admission Orders (From admission, onward)       Ordered        09/02/24 1245  Inpatient Admission  Once                          Orders Placed This Encounter   Procedures    Inpatient Admission     Standing Status:   Standing     Number of Occurrences:   1     Order Specific Question:   Level of Care     Answer:   Med Surg [16]     Order Specific Question:   Estimated length of stay     Answer:   More than 2 Midnights     Order Specific Question:   Certification     Answer:   I certify that inpatient services are medically necessary for this patient for a duration of greater than two midnights. See H&P and MD Progress Notes for additional information about the patient's course of treatment.     ED Arrival Information       Expected   -    Arrival   9/2/2024 09:58    Acuity   Urgent              Means of arrival   Ambulance    Escorted by   [facility]    Service   Hospitalist    Admission type   Emergency              Arrival complaint   AMS             Chief Complaint   Patient presents with    Chest Pain     Pt BIB EMS c/o chest pain that is worst when he is resting, pain started last night,sent to ER from urgent care, HX  TIA, HTN.        Initial Presentation: 58 y.o. male to ED from home w/ PMHX ischemic stroke, hypertension, type 2 diabetes mellitus admitted with evaluation of retrosternal localized chest pain started since last night. He reported he started having chest pain last night but got better and that he slept. Woke up w/ same CP and brought to ED . Found to have elevated trop , started on heparin gtt , asa and lipitor given . BP > 200s . Given IV antihypertensives . EKG NSR . Admitted IP status w/ elevated trop , accelerated HTN . Plan for echo , lisinopril, asa, metformin , check BNP , pct , send cultures , start abx if spikes fever . DM hold metformin and add SSI and monitor . Hx stroke cont asa, lipitor , CT head and neck neg      Anticipated Length of Stay/Certification Statement:  Patient will be admitted on an inpatient basis with an anticipated length of stay of greater than 2 midnights secondary to accelerated hypertension, elevated troponin.     9/2 Cardiology Consult   typical cardiac chest pain concerning for NSTEMI with pain controlled with nitro . Elevated trop could be explained by Hypertension . NSTEMI , HTN . Plan heparin bolus and gtt , asa  load . Statin , serial ekgs , A1c , lipdi panel , tsh , sl ntg , restart lisinopril , start BB . Up-titrate anti hypertensive regimen to control BP     9/2 Neuro Consult   CT head showed chronic L cerebellar infarct, no evidence of acute stroke of hemorrhage. Notable also for nonspecific 5 mm calcification versus calcific lesion in the right temporo-occipital junction. Rec start asa , brillinta , heparin . MRI brain to further evaluate R temporo-occipital lesion , statin , TTE , tele , A1c , PT OT ST .     Date: 9/3  Day 2: stroke like sx have resolved . F/u MRI brain . Cardiology- IV heparin, need cardiac cath, need neurologic clearance with MRI prior to cardiac catheterization.on asa , cont lipitor . Echo pending . Blood pressure later improved, troponin significantly elevated more than 500     9/3 Cardiology Note   To undergo cardiac cath after MRI completed .     ED Triage Vitals   Temperature Pulse Respirations Blood Pressure SpO2 Pain Score   09/02/24 1000 09/02/24 1000 09/02/24 1000 09/02/24 1000 09/02/24 1000 09/02/24 1031   97.7 °F (36.5 °C) 72 18 (!) 229/107 99 % 7     Weight (last 2 days)       Date/Time Weight    09/02/24 1434 58 (127.87)    09/02/24 1008 58 (127.87)    09/02/24 1000 58 (127.87)            Vital Signs (last 3 days)       Date/Time Temp Pulse Resp BP MAP (mmHg) SpO2 O2 Device Patient Position - Orthostatic VS Janie Coma Scale Score Pain    09/03/24 1528 98.4 °F (36.9 °C) 100 17 110/63 78 98 % None (Room air) Lying -- --    09/03/24 1102 -- 79 18 131/75 --  100 % -- Lying -- --    09/03/24 0934 -- -- -- -- -- -- -- -- 15 No Pain    09/03/24 0924 98.3 °F (36.8 °C) 61 18 -- -- 92 % None (Room air) -- -- --    09/03/24 0857 -- -- -- 133/79 -- -- -- -- -- --    09/03/24 0845 -- -- -- -- -- -- -- -- 15 No Pain    09/03/24 0702 98.4 °F (36.9 °C) 82 18 106/61 -- 98 % -- Lying -- --    09/02/24 2200 98.2 °F (36.8 °C) 81 16 115/76 91 96 % -- Lying 15 No Pain    09/02/24 1846 -- 83 18 118/78 94 98 % -- Lying -- --    09/02/24 1759 -- 86 13 128/69 -- -- -- Lying -- --    09/02/24 1500 -- 79 22 174/92 125 100 % None (Room air) Lying -- --    09/02/24 1434 97.8 °F (36.6 °C) 82 22 166/93 -- 98 % None (Room air) -- -- 7    09/02/24 1345 -- 73 27 172/88 -- 97 % -- -- 15 --    09/02/24 1340 -- 70 25 -- -- 100 % -- -- -- --    09/02/24 1335 -- 79 25 -- -- 100 % -- -- -- --    09/02/24 1330 -- 75 17 171/95 -- 98 % -- -- 15 --    09/02/24 1325 -- 69 22 -- -- 98 % -- -- -- --    09/02/24 1320 -- 72 18 -- -- 99 % -- -- -- --    09/02/24 1315 -- 70 20 179/97 -- 97 % -- -- 15 --    09/02/24 1310 -- 69 23 -- -- 99 % -- -- -- --    09/02/24 1305 -- 69 24 -- -- 99 % -- -- -- --    09/02/24 1300 -- 69 19 161/95 -- 99 % -- -- 15 --    09/02/24 1255 -- 67 21 -- -- 100 % -- -- -- --    09/02/24 1250 -- 69 16 -- -- 100 % -- -- -- --    09/02/24 1245 -- 70 20 153/87 -- 96 % -- -- 15 --    09/02/24 1240 -- 67 22 -- -- 99 % -- -- -- --    09/02/24 1235 -- 66 19 -- -- 96 % -- -- -- --    09/02/24 1230 -- 65 21 175/94 -- 96 % -- -- 15 --    09/02/24 1225 -- 65 23 -- -- 97 % -- -- -- --    09/02/24 1220 -- 65 23 -- -- 96 % -- -- -- --    09/02/24 1215 -- 63 18 155/85 -- 100 % -- -- 15 --    09/02/24 1210 -- 65 20 -- -- 95 % -- -- -- --    09/02/24 1205 -- 67 20 -- -- 94 % -- -- -- --    09/02/24 1200 -- 67 18 165/85 -- 95 % -- -- 15 --    09/02/24 1155 -- 63 20 -- -- 96 % -- -- -- --    09/02/24 1150 -- 62 20 -- -- 95 % -- -- -- --    09/02/24 1145 -- 64 18 180/101 -- 99 % -- -- 15 --    09/02/24 1140 --  63 20 -- -- 95 % -- -- -- --    09/02/24 1135 -- 62 18 -- -- 97 % -- -- -- --    09/02/24 1130 -- 64 16 180/101 -- 99 % -- -- 15 --    09/02/24 1125 -- 62 13 -- -- 99 % -- -- -- --    09/02/24 1120 -- 61 20 -- -- 94 % -- -- -- --    09/02/24 1115 -- 62 21 172/94 -- 95 % -- -- 15 --    09/02/24 1110 -- 62 20 -- -- 93 % -- -- -- --    09/02/24 1105 -- 63 19 -- -- 91 % -- -- -- --    09/02/24 1100 -- 66 21 146/88 112 95 % None (Room air) -- 15 --    09/02/24 1055 -- 65 19 -- -- 92 % -- -- -- --    09/02/24 1050 -- 67 19 -- -- 93 % -- -- -- --    09/02/24 10:45:02 -- 87 18 169/96 -- 97 % None (Room air) -- 15 --    09/02/24 1045 -- 75 18 169/96 -- 93 % -- -- -- --    09/02/24 1040 -- 69 10 -- -- 97 % -- -- -- --    09/02/24 1035 -- 73 21 -- -- 96 % -- -- -- --    09/02/24 1031 -- -- -- -- -- -- -- -- -- 7    09/02/24 10:30:14 -- 75 18 199/106 -- 100 % -- -- 15 --    09/02/24 1030 -- 74 23 199/106 -- 100 % -- -- -- --    09/02/24 1025 -- 70 22 -- -- 96 % -- -- -- --    09/02/24 1020 -- 72 22 -- -- 95 % -- -- -- --    09/02/24 1015 -- 70 18 222/107 -- 99 % Nasal cannula -- 15 --    09/02/24 1010 -- 69 21 -- -- 100 % -- -- -- --    09/02/24 1005 -- 71 44 -- -- 98 % -- -- -- --    09/02/24 1000 97.7 °F (36.5 °C) 72 18 229/107 -- 99 % -- -- 15 --              Pertinent Labs/Diagnostic Test Results:   Radiology:  MRI brain wo contrast   Final Interpretation by Ernesto Hardin MD (09/03 1600)      1. No acute process seen   2. Susceptibility focus in the right periatrial white matter corresponds to the calcification on head CT. This is nonspecific and may be due to prior granulomatous process or cavernous malformation.      Workstation performed: [id number]         CTA dissection protocol chest/abdomen/pelvis   Final Interpretation by Konstantin Contreras MD (09/02 2894)      No acute findings in the chest, abdomen, or pelvis. Specifically, no findings of aortic aortic syndrome.                  Resident: HERON BEST       I, the attending radiologist, have reviewed the images and agree with the final report above.      Workstation performed: [id number]         CTA stroke alert (head/neck)   Final Interpretation by Masha Callaway MD (09/02 1045)      1.   Patent major vessels of the [place] of cason without high-grade stenosis.  No aneurysm.   2.  Atherosclerotic changes without hemodynamically significant stenosis in the cervical carotid or vertebral arteries.               I personally discussed this study with JAMES REDMOND on 9/2/2024 10: 41 AM.               Workstation performed: [id number]         CT stroke alert brain   Final Interpretation by Masha Callaway MD (09/02 1229)   Addendum (preliminary) 1 of 1 by Masha Callaway MD (09/02 1229)   ADDENDUM:      There is nonspecific 5 mm calcification versus calcific lesion in the    right temporo-occipital junction (series 2 image 20). Recommend further    characterization with brain MRI without and with contrast if no prior    outside imaging is available.         This study was marked in EPIC for notification and follow-up.               Final      1.  No acute intracranial CT abnormality.   2.  Chronic appearing inferior left cerebellar infarct.                     I personally discussed this study with JAMES REDMOND on 9/2/2024 10:    41 AM.         Workstation performed: [id number]           Cardiology:  ECG 12 lead   Final Result by Jack Baptiste MD (09/02 1743)   Normal sinus rhythm with sinus arrhythmia   Normal ECG   When compared with ECG of 02-SEP-2024 10:02, (unconfirmed)   No significant change was found   Confirmed by Jack Baptiste ([id number]) on 9/2/2024 5:43:34 PM      ECG 12 lead   Final Result by Jack Baptiste MD (09/02 1743)   Normal sinus rhythm   Normal ECG   No previous ECGs available   Confirmed by Jack Baptiste ([id number]) on 9/2/2024 5:43:28 PM        GI:  No orders to display       Results from last 7 days   Lab Units 09/02/24  1007    SARS-COV-2  Negative     Results from last 7 days   Lab Units 09/03/24  0352 09/02/24  1302 09/02/24  1006   WBC Thousand/uL 10.03 14.50* 10.17*   HEMOGLOBIN g/dL 12.3 11.8* 12.5   HEMATOCRIT % 35.4* 34.1* 36.4*   PLATELETS Thousands/uL 289 301 311         Results from last 7 days   Lab Units 09/03/24  0352 09/02/24  1006   SODIUM mmol/L 139 139   POTASSIUM mmol/L 3.7 3.7   CHLORIDE mmol/L 107 107   CO2 mmol/L 22 20*   ANION GAP mmol/L 10 12   BUN mg/dL 16 18   CREATININE mg/dL 0.95 0.94   EGFR ml/min/1.73sq m 87 89   CALCIUM mg/dL 9.2 9.6   MAGNESIUM mg/dL 1.4*  --    PHOSPHORUS mg/dL 2.9  --      Results from last 7 days   Lab Units 09/03/24  0352   AST U/L 20   ALT U/L 14   ALK PHOS U/L 62   TOTAL PROTEIN g/dL 6.5   ALBUMIN g/dL 4.1   TOTAL BILIRUBIN mg/dL 0.58     Results from last 7 days   Lab Units 09/03/24  1514 09/03/24  1036 09/03/24  0615 09/02/24  1551 09/02/24  1002 09/02/24  0944   POC GLUCOSE mg/dl 169* 190* 233* 175* 201* 207*     Results from last 7 days   Lab Units 09/03/24  0352 09/02/24  1006   GLUCOSE RANDOM mg/dL 216* 210*         Results from last 7 days   Lab Units 09/02/24  1006   HEMOGLOBIN A1C % 8.4*   EAG mg/dl 194       Results from last 7 days   Lab Units 09/02/24  1458 09/02/24  1302 09/02/24  1006   HS TNI 0HR ng/L  --   --  517*   HS TNI 2HR ng/L  --  2,088*  --    HSTNI D2 ng/L  --  1,571*  --    HS TNI 4HR ng/L 2,187*  --   --    HSTNI D4 ng/L 1,670*  --   --          Results from last 7 days   Lab Units 09/03/24  1102 09/03/24  0343 09/02/24  1944 09/02/24  1302 09/02/24  1006   PROTIME seconds  --   --   --  13.9 13.6   INR   --   --   --  1.00 0.97   PTT seconds 64* 58* 41* 29 29         Results from last 7 days   Lab Units 09/02/24  1006   PROCALCITONIN ng/ml <0.05       Results from last 7 days   Lab Units 09/02/24  1302   BNP pg/mL 242*           Results from last 7 days   Lab Units 09/02/24  1007   INFLUENZA A PCR  Negative   INFLUENZA B PCR  Negative   RSV PCR  Negative          ED Treatment-Medication Administration from 09/02/2024 0958 to 09/02/2024 1410         Date/Time Order Dose Route Action     09/02/2024 1032 labetalol (NORMODYNE) injection 20 mg 20 mg Intravenous Given     09/02/2024 1029 sodium chloride 0.9 % bolus 1,000 mL 1,000 mL Intravenous New Bag     09/02/2024 1031 fentaNYL injection 50 mcg 50 mcg Intravenous Given     09/02/2024 1018 iohexol (OMNIPAQUE) 350 MG/ML injection (MULTI-DOSE) 180 mL 180 mL Intravenous Given     09/02/2024 1044 nitroglycerin (NITROSTAT) SL tablet 0.4 mg 0.4 mg Sublingual Given     09/02/2024 1237 nitroglycerin (NITROSTAT) SL tablet 0.4 mg 0.4 mg Sublingual Given     09/02/2024 1237 aspirin chewable tablet 324 mg 324 mg Oral Given     09/02/2024 1326 heparin (porcine) injection 3,300 Units 3,300 Units Intravenous Given     09/02/2024 1328 heparin (porcine) 25,000 units in 0.45% NaCl 250 mL infusion (premix) 12 Units/kg/hr Intravenous New Bag            Past Medical History:   Diagnosis Date    History of cardioembolic cerebrovascular accident (CVA) 09/02/2024    History of cardioembolic cerebrovascular accident (CVA) 09/02/2024     Present on Admission:   Accelerated hypertension   Elevated troponin I level   Type 2 diabetes mellitus, without long-term current use of insulin ([facility])      Admitting Diagnosis: Chest pain [R07.9]  Elevated troponin [R79.89]  Leg weakness [R29.898]  Elevated troponin I level [R79.89]  Age/Sex: 58 y.o. male  Admission Orders:  Scheduled Medications:  amLODIPine, 5 mg, Oral, Daily  aspirin, 81 mg, Oral, Daily  atorvastatin, 40 mg, Oral, Daily With Dinner  insulin lispro, 1-5 Units, Subcutaneous, TID AC  lisinopril, 20 mg, Oral, Daily      Continuous IV Infusions:  heparin (porcine), 3-20 Units/kg/hr (Order-Specific), Intravenous, Titrated      PRN Meds:  heparin (porcine), 1,650 Units, Intravenous, Q6H PRN  heparin (porcine), 3,300 Units, Intravenous, Q6H PRN  hydrALAZINE, 10 mg, Intravenous, Q6H  PRN  nitroglycerin, 0.4 mg, Sublingual, Q5 Min PRN    Fingerstick q6hr   NPO   I&O    Tele   Cardiac cath       IP CONSULT TO NEUROLOGY  IP CONSULT TO CARDIOLOGY    Network Utilization Review Department  ATTENTION: Please call with any questions or concerns to [phone] and carefully listen to the prompts so that you are directed to the right person. All voicemails are confidential.   For Discharge needs, contact Care Management DC Support Team at [phone] opt. [phone]  Send all requests for admission clinical reviews, approved or denied determinations and any other requests to dedicated fax number below belonging to the campus where the patient is receiving treatment. List of dedicated fax numbers for the Facilities:  FACILITY NAME UR FAX NUMBER   ADMISSION DENIALS (Administrative/Medical Necessity) [phone]   DISCHARGE SUPPORT TEAM (NETWORK) [phone]   PARENT CHILD HEALTH (Maternity/NICU/Pediatrics) [phone]   [facility] [phone]   [facility] [phone]   [facility] [phone]   [facility] [phone]   [facility] [phone]   [facility] [phone]   [facility] [phone]   [facility] [phone]   [facility] [phone]   [facility] [phone]   [facility] [phone]   [facility] [phone]

## 2024-09-04 ENCOUNTER — APPOINTMENT (INPATIENT)
Dept: VASCULAR ULTRASOUND | Facility: HOSPITAL | Age: 59
DRG: 282 | End: 2024-09-04
Payer: COMMERCIAL

## 2024-09-04 ENCOUNTER — APPOINTMENT (INPATIENT)
Dept: NON INVASIVE DIAGNOSTICS | Facility: HOSPITAL | Age: 59
DRG: 282 | End: 2024-09-04
Payer: COMMERCIAL

## 2024-09-04 LAB
ANION GAP SERPL CALCULATED.3IONS-SCNC: 9 MMOL/L (ref 4–13)
AORTIC ROOT: 3.1 CM
APICAL FOUR CHAMBER EJECTION FRACTION: 45 %
APTT PPP: 44 SECONDS (ref 23–34)
APTT PPP: 69 SECONDS (ref 23–34)
APTT PPP: >210 SECONDS (ref 23–34)
ASCENDING AORTA: 3.4 CM
ATRIAL RATE: 76 BPM
BSA FOR ECHO PROCEDURE: 1.61 M2
BUN SERPL-MCNC: 22 MG/DL (ref 5–25)
CALCIUM SERPL-MCNC: 9 MG/DL (ref 8.4–10.2)
CHLORIDE SERPL-SCNC: 107 MMOL/L (ref 96–108)
CO2 SERPL-SCNC: 23 MMOL/L (ref 21–32)
CREAT SERPL-MCNC: 1.1 MG/DL (ref 0.6–1.3)
E WAVE DECELERATION TIME: 241 MS
E/A RATIO: 0.75
ERYTHROCYTE [DISTWIDTH] IN BLOOD BY AUTOMATED COUNT: 12.3 % (ref 11.6–15.1)
FRACTIONAL SHORTENING: 29 (ref 28–44)
GFR SERPL CREATININE-BSD FRML MDRD: 73 ML/MIN/1.73SQ M
GLUCOSE SERPL-MCNC: 189 MG/DL (ref 65–140)
GLUCOSE SERPL-MCNC: 190 MG/DL (ref 65–140)
GLUCOSE SERPL-MCNC: 197 MG/DL (ref 65–140)
GLUCOSE SERPL-MCNC: 203 MG/DL (ref 65–140)
GLUCOSE SERPL-MCNC: 236 MG/DL (ref 65–140)
GLUCOSE SERPL-MCNC: 250 MG/DL (ref 65–140)
HCT VFR BLD AUTO: 35.9 % (ref 36.5–49.3)
HGB BLD-MCNC: 12.3 G/DL (ref 12–17)
INTERVENTRICULAR SEPTUM IN DIASTOLE (PARASTERNAL SHORT AXIS VIEW): 1.2 CM
INTERVENTRICULAR SEPTUM: 1.2 CM (ref 0.6–1.1)
LA/AORTA RATIO 2D: 0.97
LAAS-AP2: 13.7 CM2
LAAS-AP4: 10.1 CM2
LEFT ATRIUM SIZE: 3 CM
LEFT ATRIUM VOLUME (MOD BIPLANE): 25 ML
LEFT ATRIUM VOLUME INDEX (MOD BIPLANE): 15.5 ML/M2
LEFT INTERNAL DIMENSION IN SYSTOLE: 2.4 CM (ref 2.1–4)
LEFT VENTRICULAR INTERNAL DIMENSION IN DIASTOLE: 3.4 CM (ref 3.5–6)
LEFT VENTRICULAR POSTERIOR WALL IN END DIASTOLE: 1 CM
LEFT VENTRICULAR STROKE VOLUME: 26 ML
LVSV (TEICH): 26 ML
MAGNESIUM SERPL-MCNC: 1.5 MG/DL (ref 1.9–2.7)
MCH RBC QN AUTO: 29.9 PG (ref 26.8–34.3)
MCHC RBC AUTO-ENTMCNC: 34.3 G/DL (ref 31.4–37.4)
MCV RBC AUTO: 87 FL (ref 82–98)
MV E'TISSUE VEL-SEP: 7 CM/S
MV PEAK A VEL: 0.83 M/S
MV PEAK E VEL: 62 CM/S
MV STENOSIS PRESSURE HALF TIME: 70 MS
MV VALVE AREA P 1/2 METHOD: 3.14
P AXIS: 25 DEGREES
PLATELET # BLD AUTO: 300 THOUSANDS/UL (ref 149–390)
PMV BLD AUTO: 10.7 FL (ref 8.9–12.7)
POTASSIUM SERPL-SCNC: 3.6 MMOL/L (ref 3.5–5.3)
PR INTERVAL: 134 MS
QRS AXIS: 12 DEGREES
QRSD INTERVAL: 84 MS
QT INTERVAL: 406 MS
QTC INTERVAL: 456 MS
RBC # BLD AUTO: 4.11 MILLION/UL (ref 3.88–5.62)
RIGHT VENTRICLE ID DIMENSION: 2.4 CM
SL CV LV EF: 50
SL CV PED ECHO LEFT VENTRICLE DIASTOLIC VOLUME (MOD BIPLANE) 2D: 46 ML
SL CV PED ECHO LEFT VENTRICLE SYSTOLIC VOLUME (MOD BIPLANE) 2D: 20 ML
SODIUM SERPL-SCNC: 139 MMOL/L (ref 135–147)
T WAVE AXIS: 8 DEGREES
TRICUSPID ANNULAR PLANE SYSTOLIC EXCURSION: 3.2 CM
VENTRICULAR RATE: 76 BPM
WBC # BLD AUTO: 8.84 THOUSAND/UL (ref 4.31–10.16)

## 2024-09-04 PROCEDURE — 93458 L HRT ARTERY/VENTRICLE ANGIO: CPT | Performed by: STUDENT IN AN ORGANIZED HEALTH CARE EDUCATION/TRAINING PROGRAM

## 2024-09-04 PROCEDURE — 80048 BASIC METABOLIC PNL TOTAL CA: CPT | Performed by: INTERNAL MEDICINE

## 2024-09-04 PROCEDURE — C1769 GUIDE WIRE: HCPCS | Performed by: STUDENT IN AN ORGANIZED HEALTH CARE EDUCATION/TRAINING PROGRAM

## 2024-09-04 PROCEDURE — 85027 COMPLETE CBC AUTOMATED: CPT | Performed by: INTERNAL MEDICINE

## 2024-09-04 PROCEDURE — 93306 TTE W/DOPPLER COMPLETE: CPT | Performed by: INTERNAL MEDICINE

## 2024-09-04 PROCEDURE — 76937 US GUIDE VASCULAR ACCESS: CPT | Performed by: STUDENT IN AN ORGANIZED HEALTH CARE EDUCATION/TRAINING PROGRAM

## 2024-09-04 PROCEDURE — 99153 MOD SED SAME PHYS/QHP EA: CPT | Performed by: STUDENT IN AN ORGANIZED HEALTH CARE EDUCATION/TRAINING PROGRAM

## 2024-09-04 PROCEDURE — 93880 EXTRACRANIAL BILAT STUDY: CPT

## 2024-09-04 PROCEDURE — 99152 MOD SED SAME PHYS/QHP 5/>YRS: CPT | Performed by: STUDENT IN AN ORGANIZED HEALTH CARE EDUCATION/TRAINING PROGRAM

## 2024-09-04 PROCEDURE — 93306 TTE W/DOPPLER COMPLETE: CPT

## 2024-09-04 PROCEDURE — 93010 ELECTROCARDIOGRAM REPORT: CPT | Performed by: INTERNAL MEDICINE

## 2024-09-04 PROCEDURE — C1894 INTRO/SHEATH, NON-LASER: HCPCS | Performed by: STUDENT IN AN ORGANIZED HEALTH CARE EDUCATION/TRAINING PROGRAM

## 2024-09-04 PROCEDURE — 93971 EXTREMITY STUDY: CPT

## 2024-09-04 PROCEDURE — 99232 SBSQ HOSP IP/OBS MODERATE 35: CPT | Performed by: INTERNAL MEDICINE

## 2024-09-04 PROCEDURE — 4A023N7 MEASUREMENT OF CARDIAC SAMPLING AND PRESSURE, LEFT HEART, PERCUTANEOUS APPROACH: ICD-10-PCS | Performed by: STUDENT IN AN ORGANIZED HEALTH CARE EDUCATION/TRAINING PROGRAM

## 2024-09-04 PROCEDURE — 99232 SBSQ HOSP IP/OBS MODERATE 35: CPT | Performed by: NURSE PRACTITIONER

## 2024-09-04 PROCEDURE — 83735 ASSAY OF MAGNESIUM: CPT | Performed by: NURSE PRACTITIONER

## 2024-09-04 PROCEDURE — 93880 EXTRACRANIAL BILAT STUDY: CPT | Performed by: SURGERY

## 2024-09-04 PROCEDURE — 87081 CULTURE SCREEN ONLY: CPT | Performed by: PHYSICIAN ASSISTANT

## 2024-09-04 PROCEDURE — 82948 REAGENT STRIP/BLOOD GLUCOSE: CPT

## 2024-09-04 PROCEDURE — 93971 EXTREMITY STUDY: CPT | Performed by: SURGERY

## 2024-09-04 PROCEDURE — 85730 THROMBOPLASTIN TIME PARTIAL: CPT | Performed by: INTERNAL MEDICINE

## 2024-09-04 PROCEDURE — B2111ZZ FLUOROSCOPY OF MULTIPLE CORONARY ARTERIES USING LOW OSMOLAR CONTRAST: ICD-10-PCS | Performed by: STUDENT IN AN ORGANIZED HEALTH CARE EDUCATION/TRAINING PROGRAM

## 2024-09-04 RX ORDER — MAGNESIUM SULFATE HEPTAHYDRATE 40 MG/ML
4 INJECTION, SOLUTION INTRAVENOUS ONCE
Status: COMPLETED | OUTPATIENT
Start: 2024-09-04 | End: 2024-09-04

## 2024-09-04 RX ORDER — FENTANYL CITRATE 50 UG/ML
INJECTION, SOLUTION INTRAMUSCULAR; INTRAVENOUS CODE/TRAUMA/SEDATION MEDICATION
Status: DISCONTINUED | OUTPATIENT
Start: 2024-09-04 | End: 2024-09-04 | Stop reason: HOSPADM

## 2024-09-04 RX ORDER — ATORVASTATIN CALCIUM 40 MG/1
80 TABLET, FILM COATED ORAL
Status: DISCONTINUED | OUTPATIENT
Start: 2024-09-04 | End: 2024-09-05 | Stop reason: HOSPADM

## 2024-09-04 RX ORDER — SODIUM CHLORIDE, SODIUM GLUCONATE, SODIUM ACETATE, POTASSIUM CHLORIDE, MAGNESIUM CHLORIDE, SODIUM PHOSPHATE, DIBASIC, AND POTASSIUM PHOSPHATE .53; .5; .37; .037; .03; .012; .00082 G/100ML; G/100ML; G/100ML; G/100ML; G/100ML; G/100ML; G/100ML
100 INJECTION, SOLUTION INTRAVENOUS CONTINUOUS
Status: DISCONTINUED | OUTPATIENT
Start: 2024-09-04 | End: 2024-09-04

## 2024-09-04 RX ORDER — NITROGLYCERIN 20 MG/100ML
INJECTION INTRAVENOUS CODE/TRAUMA/SEDATION MEDICATION
Status: DISCONTINUED | OUTPATIENT
Start: 2024-09-04 | End: 2024-09-04 | Stop reason: HOSPADM

## 2024-09-04 RX ORDER — MIDAZOLAM HYDROCHLORIDE 2 MG/2ML
INJECTION, SOLUTION INTRAMUSCULAR; INTRAVENOUS CODE/TRAUMA/SEDATION MEDICATION
Status: DISCONTINUED | OUTPATIENT
Start: 2024-09-04 | End: 2024-09-04 | Stop reason: HOSPADM

## 2024-09-04 RX ORDER — SODIUM CHLORIDE 9 MG/ML
75 INJECTION, SOLUTION INTRAVENOUS CONTINUOUS
Status: DISCONTINUED | OUTPATIENT
Start: 2024-09-04 | End: 2024-09-04

## 2024-09-04 RX ORDER — LIDOCAINE WITH 8.4% SOD BICARB 0.9%(10ML)
SYRINGE (ML) INJECTION CODE/TRAUMA/SEDATION MEDICATION
Status: DISCONTINUED | OUTPATIENT
Start: 2024-09-04 | End: 2024-09-04 | Stop reason: HOSPADM

## 2024-09-04 RX ORDER — CHLORHEXIDINE GLUCONATE ORAL RINSE 1.2 MG/ML
15 SOLUTION DENTAL EVERY 12 HOURS SCHEDULED
Status: DISCONTINUED | OUTPATIENT
Start: 2024-09-04 | End: 2024-09-05 | Stop reason: HOSPADM

## 2024-09-04 RX ORDER — HEPARIN SODIUM 1000 [USP'U]/ML
INJECTION, SOLUTION INTRAVENOUS; SUBCUTANEOUS CODE/TRAUMA/SEDATION MEDICATION
Status: DISCONTINUED | OUTPATIENT
Start: 2024-09-04 | End: 2024-09-04 | Stop reason: HOSPADM

## 2024-09-04 RX ORDER — MUPIROCIN 20 MG/G
1 OINTMENT TOPICAL EVERY 12 HOURS SCHEDULED
Status: DISPENSED | OUTPATIENT
Start: 2024-09-04 | End: 2024-09-05

## 2024-09-04 RX ORDER — METOPROLOL SUCCINATE 25 MG/1
12.5 TABLET, EXTENDED RELEASE ORAL DAILY
Status: DISCONTINUED | OUTPATIENT
Start: 2024-09-05 | End: 2024-09-05 | Stop reason: HOSPADM

## 2024-09-04 RX ADMIN — INSULIN LISPRO 1 UNITS: 100 INJECTION, SOLUTION INTRAVENOUS; SUBCUTANEOUS at 12:16

## 2024-09-04 RX ADMIN — INSULIN LISPRO 2 UNITS: 100 INJECTION, SOLUTION INTRAVENOUS; SUBCUTANEOUS at 16:29

## 2024-09-04 RX ADMIN — LISINOPRIL 20 MG: 20 TABLET ORAL at 08:15

## 2024-09-04 RX ADMIN — ATORVASTATIN CALCIUM 80 MG: 40 TABLET, FILM COATED ORAL at 16:29

## 2024-09-04 RX ADMIN — MAGNESIUM SULFATE HEPTAHYDRATE 4 G: 40 INJECTION, SOLUTION INTRAVENOUS at 12:49

## 2024-09-04 RX ADMIN — ASPIRIN 81 MG: 81 TABLET, COATED ORAL at 08:15

## 2024-09-04 RX ADMIN — CHLORHEXIDINE GLUCONATE 0.12% ORAL RINSE 15 ML: 1.2 LIQUID ORAL at 13:19

## 2024-09-04 RX ADMIN — AMLODIPINE BESYLATE 5 MG: 5 TABLET ORAL at 08:15

## 2024-09-04 RX ADMIN — HEPARIN SODIUM 17 UNITS/KG/HR: 10000 INJECTION, SOLUTION INTRAVENOUS at 18:22

## 2024-09-04 RX ADMIN — HEPARIN SODIUM 1650 UNITS: 1000 INJECTION INTRAVENOUS; SUBCUTANEOUS at 20:51

## 2024-09-04 NOTE — PLAN OF CARE
Problem: PAIN - ADULT  Goal: Verbalizes/displays adequate comfort level or baseline comfort level  Description: Interventions:  - Encourage patient to monitor pain and request assistance  - Assess pain using appropriate pain scale  - Administer analgesics based on type and severity of pain and evaluate response  - Implement non-pharmacological measures as appropriate and evaluate response  - Consider cultural and social influences on pain and pain management  - Notify physician/advanced practitioner if interventions unsuccessful or patient reports new pain  Outcome: Progressing     Problem: INFECTION - ADULT  Goal: Absence or prevention of progression during hospitalization  Description: INTERVENTIONS:  - Assess and monitor for signs and symptoms of infection  - Monitor lab/diagnostic results  - Monitor all insertion sites, i.e. indwelling lines, tubes, and drains  - Monitor endotracheal if appropriate and nasal secretions for changes in amount and color  - [place] appropriate cooling/warming therapies per order  - Administer medications as ordered  - Instruct and encourage patient and family to use good hand hygiene technique  - Identify and instruct in appropriate isolation precautions for identified infection/condition  Outcome: Progressing  Goal: Absence of fever/infection during neutropenic period  Description: INTERVENTIONS:  - Monitor WBC    Outcome: Progressing     Problem: SAFETY ADULT  Goal: Patient will remain free of falls  Description: INTERVENTIONS:  - Educate patient/family on patient safety including physical limitations  - Instruct patient to call for assistance with activity   - Consult OT/PT to assist with strengthening/mobility   - Keep Call bell within reach  - Keep bed low and locked with side rails adjusted as appropriate  - Keep care items and personal belongings within reach  - Initiate and maintain comfort rounds  - Make Fall Risk Sign visible to staff  - Offer Toileting every  Hours,  in advance of need  - Initiate/Maintain alarm  - Obtain necessary fall risk management equipment:   - Apply yellow socks and bracelet for high fall risk patients  - Consider moving patient to room near nurses station  Outcome: Progressing  Goal: Maintain or return to baseline ADL function  Description: INTERVENTIONS:  -  Assess patient's ability to carry out ADLs; assess patient's baseline for ADL function and identify physical deficits which impact ability to perform ADLs (bathing, care of mouth/teeth, toileting, grooming, dressing, etc.)  - Assess/evaluate cause of self-care deficits   - Assess range of motion  - Assess patient's mobility; develop plan if impaired  - Assess patient's need for assistive devices and provide as appropriate  - Encourage maximum independence but intervene and supervise when necessary  - Involve family in performance of ADLs  - Assess for home care needs following discharge   - Consider OT consult to assist with ADL evaluation and planning for discharge  - Provide patient education as appropriate  Outcome: Progressing  Goal: Maintains/Returns to pre admission functional level  Description: INTERVENTIONS:  - Perform AM-PAC 6 Click Basic Mobility/ Daily Activity assessment daily.  - Set and communicate daily mobility goal to care team and patient/family/caregiver.   - Collaborate with rehabilitation services on mobility goals if consulted  - Perform Range of Motion  times a day.  - Reposition patient every  hours.  - Dangle patient  times a day  - Stand patient  times a day  - Ambulate patient  times a day  - Out of bed to chair  times a day   - Out of bed for meals  times a day  - Out of bed for toileting  - Record patient progress and toleration of activity level   Outcome: Progressing     Problem: DISCHARGE PLANNING  Goal: Discharge to home or other facility with appropriate resources  Description: INTERVENTIONS:  - Identify barriers to discharge w/patient and caregiver  - Arrange for  needed discharge resources and transportation as appropriate  - Identify discharge learning needs (meds, wound care, etc.)  - Arrange for interpretive services to assist at discharge as needed  - Refer to Case Management Department for coordinating discharge planning if the patient needs post-hospital services based on physician/advanced practitioner order or complex needs related to functional status, cognitive ability, or social support system  Outcome: Progressing     Problem: Knowledge Deficit  Goal: Patient/family/caregiver demonstrates understanding of disease process, treatment plan, medications, and discharge instructions  Description: Complete learning assessment and assess knowledge base.  Interventions:  - Provide teaching at level of understanding  - Provide teaching via preferred learning methods  Outcome: Progressing     Problem: CARDIOVASCULAR - ADULT  Goal: Maintains optimal cardiac output and hemodynamic stability  Description: INTERVENTIONS:  - Monitor I/O, vital signs and rhythm  - Monitor for S/S and trends of decreased cardiac output  - Administer and titrate ordered vasoactive medications to optimize hemodynamic stability  - Assess quality of pulses, skin color and temperature  - Assess for signs of decreased coronary artery perfusion  - Instruct patient to report change in severity of symptoms  Outcome: Progressing  Goal: Absence of cardiac dysrhythmias or at baseline rhythm  Description: INTERVENTIONS:  - Continuous cardiac monitoring, vital signs, obtain 12 lead EKG if ordered  - Administer antiarrhythmic and heart rate control medications as ordered  - Monitor electrolytes and administer replacement therapy as ordered  Outcome: Progressing     Problem: METABOLIC, FLUID AND ELECTROLYTES - ADULT  Goal: Glucose maintained within target range  Description: INTERVENTIONS:  - Monitor Blood Glucose as ordered  - Assess for signs and symptoms of hyperglycemia and hypoglycemia  - Administer ordered  medications to maintain glucose within target range  - Assess nutritional intake and initiate nutrition service referral as needed  Outcome: Progressing     Problem: HEMATOLOGIC - ADULT  Goal: Maintains hematologic stability  Description: INTERVENTIONS  - Assess for signs and symptoms of bleeding or hemorrhage  - Monitor labs  - Administer supportive blood products/factors as ordered and appropriate  Outcome: Progressing     Problem: Nutrition/Hydration-ADULT  Goal: Nutrient/Hydration intake appropriate for improving, restoring or maintaining nutritional needs  Description: Monitor and assess patient's nutrition/hydration status for malnutrition. Collaborate with interdisciplinary team and initiate plan and interventions as ordered.  Monitor patient's weight and dietary intake as ordered or per policy. Utilize nutrition screening tool and intervene as necessary. Determine patient's food preferences and provide high-protein, high-caloric foods as appropriate.     INTERVENTIONS:  - Monitor oral intake, urinary output, labs, and treatment plans  - Assess nutrition and hydration status and recommend course of action  - Evaluate amount of meals eaten  - Assist patient with eating if necessary   - Allow adequate time for meals  - Recommend/ encourage appropriate diets, oral nutritional supplements, and vitamin/mineral supplements  - Order, calculate, and assess calorie counts as needed  - Recommend, monitor, and adjust tube feedings and TPN/PPN based on assessed needs  - Assess need for intravenous fluids  - Provide specific nutrition/hydration education as appropriate  - Include patient/family/caregiver in decisions related to nutrition  Outcome: Progressing

## 2024-09-04 NOTE — ASSESSMENT & PLAN NOTE
Looks like typical chest pain with elevated troponin  Heparin gtt per cardiology  Patient is on aspirin 325 mg after the stroke, continue with the same dose and Lipitor 80 mg  Echocardiogram performed, EF 50%

## 2024-09-04 NOTE — ASSESSMENT & PLAN NOTE
Lab Results   Component Value Date    HGBA1C 8.4 (H) 09/02/2024       Recent Labs     09/03/24  1514 09/03/24 2039 09/04/24  0707 09/04/24  1110   POCGLU 169* 226* 190* 203*         Blood Sugar Average: Last 72 hrs:  (P) 198.375  Will check glycohemoglobin  hold home dose of metformin  Insulin sliding scale

## 2024-09-04 NOTE — TRANSPORTATION MEDICAL NECESSITY
"Section I - General Information    Name of Patient: Aristoteles Toribio Reyes                 : 1965    Medicare #: [id number]  Transport Date: 24 (PCS is valid for round trips on this date and for all repetitive trips in the 60-day range as noted below.)  Origin: [facility] 4TH FLOOR MED SURG UNIT                                                         Destination: [facility]   Is the pt's stay covered under Medicare Part A (PPS/DRG)   []     Closest appropriate facility? If no, why is transport to more distant facility required? Yes  If hospice pt, is this transport related to pt's terminal illness? No       Section II - Medical Necessity Questionnaire  Ambulance transportation is medically necessary only if other means of transport are contraindicated or would be potentially harmful to the patient. To meet this requirement, the patient must either be \"bed confined\" or suffer from a condition such that transport by means other than ambulance is contraindicated by the patient's condition. The following questions must be answered by the medical professional signing below for this form to be valid:    1)  Describe the MEDICAL CONDITION (physical and/or mental) of this patient AT THE TIME OF AMBULANCE TRANSPORT that requires the patient to be transported in an ambulance and why transport by other means is contraindicated by the patient's condition: transfer to [place] for CABG.     2) Is the patient \"bed confined\" as defined below?     Yes  To be \"be confined\" the patient must satisfy all three of the following conditions: (1) unable to get up from bed without Assistance; AND (2) unable to ambulate; AND (3) unable to sit in a chair or wheelchair.    3) Can this patient safely be transported by car or wheelchair van (i.e., seated during transport without a medical attendant or monitoring)?   No    4) In addition to completing questions 1-3 above, please check any of the " following conditions that apply*:   *Note: supporting documentation for any boxes checked must be maintained in the patient's medical records.  If hosp-hosp transfer, describe services needed at 2nd facility not available at 1st facility?   Cardiac monitoring required en route       Section III - Signature of Physician or Healthcare Professional  I certify that the above information is true and correct based on my evaluation of this patient, and represent that the patient requires transport by ambulance and that other forms of transport are contraindicated. I understand that this information will be used by the Centers for Medicare and Medicaid Services (CMS) to support the determination of medical necessity for ambulance services, and I represent that I have personal knowledge of the patient's condition at time of transport.    []  If this box is checked, I also certify that the patient is physically or mentally incapable of signing the ambulance service's claim and that the institution with which I am affiliated has furnished care, services, or assistance to the patient.    My signature below is made on behalf of the patient pursuant to 42 CFR §424.36(b)(4). In accordance with 42 CFR §424.37, the specific reason(s) that the patient is physically or mentally incapable of signing the claim form is as follows:       Signature of Physician* or Healthcare Professional_Yulia Burgos [facility]   Signature Date 09/04/24 (For scheduled repetitive transports, this form is not valid for transports performed more than 60 days after this date)    Printed Name & Credentials of Physician or Healthcare Professional (MD, DO, RN, etc.)Yulia Burgos LS  *Form must be signed by patient's attending physician for scheduled, repetitive transports. For non-repetitive, unscheduled ambulance transports, if unable to obtain the signature of the attending physician, any of the following may sign (choose appropriate option  below)  [] Physician Assistant []  Clinical Nurse Specialist []  Registered Nurse  []  Nurse Practitioner  [x] Discharge Planner

## 2024-09-04 NOTE — PROGRESS NOTES
"Cardiology Progress Note - Aristoteles Toribio Reyes 58 y.o. male MRN: [id number]    Unit/Bed#: -[facility] Encounter: [id number]      Assessment/Plan:  1.  Multivessel CAD  Mid RCA 80% segmental stenosis, proximal to mid LAD diffuse 80 to 90% stenosis, Diag 1 80% but smaller caliber, circumflex/OM1 30% stenosis  Waiting recommendations from CT surgery  Continue amlodipine, aspirin, Lipitor, lisinopril, heparin drip    2.  Hypertension  Stable, 127/83  Continue amlodipine, lisinopril    3.  Diabetes  Hemoglobin A1c 8.4  Management per [place]    4.  Hyperlipidemia  Lipid panel, total cholesterol 162, triglycerides 185, HDL 49, LDL 76  Continue Lipitor        Subjective:  Patient seen and examined.  No significant events overnight.  Returned from cardiac Cath Lab which revealed multivessel CAD awaiting to hear from CT surgery    Objective:     Vitals: Blood pressure 127/83, pulse 79, temperature (!) 97.4 °F (36.3 °C), temperature source Temporal, resp. rate 16, height 5' 2\" (1.575 m), weight 60.8 kg (134 lb), SpO2 100%., Body mass index is 24.51 kg/m².,   Orthostatic Blood Pressures      Flowsheet Row Most Recent Value   Blood Pressure 127/83 filed at 09/04/2024 1200   Patient Position - Orthostatic VS Lying filed at 09/04/2024 0713              Intake/Output Summary (Last 24 hours) at 9/4/2024 1556  Last data filed at 9/4/2024 0058  Gross per 24 hour   Intake --   Output 0 ml   Net 0 ml         Physical Exam:  GEN: Alert and oriented x 3, in no acute distress.  Well appearing and well nourished.   HEENT: Sclera anicteric, conjunctivae pink, mucous membranes moist. Oropharynx clear.   NECK: Supple, no carotid bruits, no significant JVD. Trachea midline, no thyromegaly.   HEART: Regular rhythm, normal S1 and S2, no murmurs, clicks, gallops or rubs. PMI nondisplaced, no thrills.   LUNGS: Clear to auscultation bilaterally; no wheezes, rales, or rhonchi. No increased work of breathing or signs of respiratory distress. "   ABDOMEN: Soft, nontender, nondistended, normoactive bowel sounds.   EXTREMITIES: Skin warm and well perfused, no clubbing, cyanosis, or edema.  NEURO: No focal findings. Normal speech. Mood and affect normal.   SKIN: Normal without suspicious lesions on exposed skin.      Medications:      Current Facility-Administered Medications:     amLODIPine (NORVASC) tablet 5 mg, 5 mg, Oral, Daily, Aileen Carol Saenz, DUNP, 5 mg at 09/04/24 0815    aspirin (ECOTRIN LOW STRENGTH) EC tablet 81 mg, 81 mg, Oral, Daily, Aileen Carol Saenz, CRNP, 81 mg at 09/04/24 0815    atorvastatin (LIPITOR) tablet 80 mg, 80 mg, Oral, Daily With Dinner, Luz Maria Mitchell PA-C    chlorhexidine (PERIDEX) 0.12 % oral rinse 15 mL, 15 mL, Mouth/Throat, Q12H KALYANI, Luciano Myers PA-C, 15 mL at 09/04/24 1319    heparin (porcine) 25,000 units in 0.45% NaCl 250 mL infusion (premix), 3-20 Units/kg/hr (Order-Specific), Intravenous, Titrated, Tiffanie Theodore DO, Last Rate: 9.4 mL/hr at 09/04/24 1431, 17 Units/kg/hr at 09/04/24 1431    heparin (porcine) injection 1,650 Units, 1,650 Units, Intravenous, Q6H PRN, DU SpearNP, 1,650 Units at 09/03/24 2002    heparin (porcine) injection 3,300 Units, 3,300 Units, Intravenous, Q6H PRN, AileenDU ParsonNP, 3,300 Units at 09/02/24 2119    hydrALAZINE (APRESOLINE) injection 10 mg, 10 mg, Intravenous, Q6H PRN, SRINIVAS Spear    insulin lispro (HumALOG/ADMELOG) 100 units/mL subcutaneous injection 1-5 Units, 1-5 Units, Subcutaneous, TID AC, 1 Units at 09/04/24 1216 **AND** Fingerstick Glucose (POCT), , , TID AC, SRINIVAS Spear    lisinopril (ZESTRIL) tablet 20 mg, 20 mg, Oral, Daily, SRINIVAS Spear, 20 mg at 09/04/24 0815    magnesium sulfate 4 g/100 mL IVPB (premix) 4 g, 4 g, Intravenous, Once, SRINIVAS Wilkerson, Last Rate: 25 mL/hr at 09/04/24 1249, 4 g at 09/04/24 1249    mupirocin (BACTROBAN) 2 % ointment 1 Application, 1 Application, Topical, Q12H [facility], Luciano Myers,  PA-ILEANA    nitroglycerin (NITROSTAT) SL tablet 0.4 mg, 0.4 mg, Sublingual, Q5 Min PRN, Aileen Saenz, CRNP, 0.4 mg at 09/02/24 1432     Labs & Results:    Results from last 7 days   Lab Units 09/02/24  1458 09/02/24  1302 09/02/24  1006   HS TNI 0HR ng/L  --   --  517*   HS TNI 2HR ng/L  --  2,088*  --    HS TNI 4HR ng/L 2,187*  --   --    HSTNI D4 ng/L 1,670*  --   --      Results from last 7 days   Lab Units 09/04/24  0225 09/03/24  0352 09/02/24  1302   WBC Thousand/uL 8.84 10.03 14.50*   HEMOGLOBIN g/dL 12.3 12.3 11.8*   HEMATOCRIT % 35.9* 35.4* 34.1*   PLATELETS Thousands/uL 300 289 301     Results from last 7 days   Lab Units 09/02/24  1006   TRIGLYCERIDES mg/dL 185*   HDL mg/dL 49     Results from last 7 days   Lab Units 09/04/24  0225 09/03/24  0352 09/02/24  1006   POTASSIUM mmol/L 3.6 3.7 3.7   CHLORIDE mmol/L 107 107 107   CO2 mmol/L 23 22 20*   BUN mg/dL 22 16 18   CREATININE mg/dL 1.10 0.95 0.94   CALCIUM mg/dL 9.0 9.2 9.6   ALK PHOS U/L  --  62  --    ALT U/L  --  14  --    AST U/L  --  20  --      Results from last 7 days   Lab Units 09/04/24  1111 09/04/24  0220 09/03/24  1812 09/02/24  1944 09/02/24  1302 09/02/24  1006   INR   --   --   --   --  1.00 0.97   PTT seconds >210* 69* 50*   < > 29 29    < > = values in this interval not displayed.     Results from last 7 days   Lab Units 09/04/24  1124 09/03/24  0352   MAGNESIUM mg/dL 1.5* 1.4*

## 2024-09-04 NOTE — PLAN OF CARE
Problem: PAIN - ADULT  Goal: Verbalizes/displays adequate comfort level or baseline comfort level  Description: Interventions:  - Encourage patient to monitor pain and request assistance  - Assess pain using appropriate pain scale  - Administer analgesics based on type and severity of pain and evaluate response  - Implement non-pharmacological measures as appropriate and evaluate response  - Consider cultural and social influences on pain and pain management  - Notify physician/advanced practitioner if interventions unsuccessful or patient reports new pain  Outcome: Progressing     Problem: INFECTION - ADULT  Goal: Absence or prevention of progression during hospitalization  Description: INTERVENTIONS:  - Assess and monitor for signs and symptoms of infection  - Monitor lab/diagnostic results  - Monitor all insertion sites, i.e. indwelling lines, tubes, and drains  - Monitor endotracheal if appropriate and nasal secretions for changes in amount and color  - [place] appropriate cooling/warming therapies per order  - Administer medications as ordered  - Instruct and encourage patient and family to use good hand hygiene technique  - Identify and instruct in appropriate isolation precautions for identified infection/condition  Outcome: Progressing  Goal: Absence of fever/infection during neutropenic period  Description: INTERVENTIONS:  - Monitor WBC    Outcome: Progressing     Problem: SAFETY ADULT  Goal: Patient will remain free of falls  Description: INTERVENTIONS:  - Educate patient/family on patient safety including physical limitations  - Instruct patient to call for assistance with activity   - Consult OT/PT to assist with strengthening/mobility   - Keep Call bell within reach  - Keep bed low and locked with side rails adjusted as appropriate  - Keep care items and personal belongings within reach  - Initiate and maintain comfort rounds  - Make Fall Risk Sign visible to staff  - Offer Toileting every 2 Hours,  in advance of need  - Apply yellow socks and bracelet for high fall risk patients  - Consider moving patient to room near nurses station  Outcome: Progressing  Goal: Maintain or return to baseline ADL function  Description: INTERVENTIONS:  -  Assess patient's ability to carry out ADLs; assess patient's baseline for ADL function and identify physical deficits which impact ability to perform ADLs (bathing, care of mouth/teeth, toileting, grooming, dressing, etc.)  - Assess/evaluate cause of self-care deficits   - Assess range of motion  - Assess patient's mobility; develop plan if impaired  - Assess patient's need for assistive devices and provide as appropriate  - Encourage maximum independence but intervene and supervise when necessary  - Involve family in performance of ADLs  - Assess for home care needs following discharge   - Consider OT consult to assist with ADL evaluation and planning for discharge  - Provide patient education as appropriate  Outcome: Progressing  Goal: Maintains/Returns to pre admission functional level  Description: INTERVENTIONS:  - Perform AM-PAC 6 Click Basic Mobility/ Daily Activity assessment daily.  - Set and communicate daily mobility goal to care team and patient/family/caregiver.   - Collaborate with rehabilitation services on mobility goals if consulted  - Perform Range of Motion 2 times a day.  - Reposition patient every 2 hours.  - Dangle patient 2 times a day  - Stand patient 2 times a day  - Ambulate patient 2 times a day  - Out of bed to chair 2 times a day   - Out of bed for meals 2 times a day  - Out of bed for toileting  - Record patient progress and toleration of activity level   Outcome: Progressing     Problem: DISCHARGE PLANNING  Goal: Discharge to home or other facility with appropriate resources  Description: INTERVENTIONS:  - Identify barriers to discharge w/patient and caregiver  - Arrange for needed discharge resources and transportation as appropriate  - Identify  discharge learning needs (meds, wound care, etc.)  - Arrange for interpretive services to assist at discharge as needed  - Refer to Case Management Department for coordinating discharge planning if the patient needs post-hospital services based on physician/advanced practitioner order or complex needs related to functional status, cognitive ability, or social support system  Outcome: Progressing

## 2024-09-04 NOTE — ASSESSMENT & PLAN NOTE
Presented with substernal chest pain unresolved with nitro  Troponins elevated 0-hour 517, 2-hour 2088, 4-hour 2187  Patient is on aspirin 325 mg after the stroke, continue with the same dose and Lipitor 80 mg  Cardiac Testing:  Echo: EF 50%  Catheterization: Multiple vessels with 80-90% occlusions  Anticipate transfer to [place] for CABG.

## 2024-09-04 NOTE — PROGRESS NOTES
[facility]  Progress Note  Name: Aristoteles Toribio Reyes I  MRN: [id number]  Unit/Bed#: -01 I Date of Admission: 9/2/2024   Date of Service: 9/4/2024 I Hospital Day: 2    Assessment & Plan   * Chest pain  Assessment & Plan  Presented with substernal chest pain unresolved with nitro  Troponins elevated 0-hour 517, 2-hour 2088, 4-hour 2187  Patient is on aspirin 325 mg after the stroke, continue with the same dose and Lipitor 80 mg  Cardiac Testing:  Echo: EF 50%  Catheterization: Multiple vessels with 80-90% occlusions  Anticipate transfer to [place] for CABG.    Stroke-like symptoms  Assessment & Plan  Patient presented with chest pain and numbness and tingling in right upper and lower extremities.  (Since resolved)    Neurology recommended admission for stroke pathway  CT head showed no acute pathology  Recommending aspirin load, baby aspirin  Recommend MRI brain w/wo for TIA/stroke w/u and further evaluation of R temporo-occipital lesion  Atorvastatin 40mg  TTE   Telemetry  A1C 8.4  PT/OT/ST      Type 2 diabetes mellitus, without long-term current use of insulin (HCC)  Assessment & Plan  Lab Results   Component Value Date    HGBA1C 8.4 (H) 09/02/2024       Recent Labs     09/03/24  1514 09/03/24  2039 09/04/24  0707 09/04/24  1110   POCGLU 169* 226* 190* 203*         Blood Sugar Average: Last 72 hrs:  (P) 198.375  Will check glycohemoglobin  hold home dose of metformin  Insulin sliding scale    History of stroke in prior 3 months  Assessment & Plan  On CAT scan patient has old inferior cerebellar infarct  Does not have any focal logical deficit on exam  He ambulates without any assistance  Continue aspirin 325 mg  Start Lipitor 80 mg  Repeat CTA head and neck no evidence of acute infarct      Elevated troponin I level  Assessment & Plan  Looks like typical chest pain with elevated troponin  Heparin gtt per cardiology  Patient is on aspirin 325 mg after the stroke, continue with  the same dose and Lipitor 80 mg  Echocardiogram performed, EF 50%    Accelerated hypertension  Assessment & Plan  Patient admitted with blood pressure more than 200 systolic  Admitted with retrosternal crushing chest pain since last night  EKG showed normal sinus rhythm  Patient was given IV NT hypertensives, sublingual nitro  Blood pressure later improved, troponin significantly elevated more than 500  Patient not sure about all of his medicines but he brought lisinopril, aspirin 325 mg   Will resume lisinopril 40 mg               VTE Pharmacologic Prophylaxis:   High Risk (Score >/= 5) - Pharmacological DVT Prophylaxis Ordered: heparin drip. Sequential Compression Devices Ordered.    Mobility:   Basic Mobility Inpatient Raw Score: 23  JH-HLM Goal: 7: Walk 25 feet or more  JH-HLM Achieved: 7: Walk 25 feet or more  JH-HLM Goal achieved. Continue to encourage appropriate mobility.      Education and Discussions with Family / Patient: Updated  (wife) at bedside.    Total Time Spent on Date of Encounter in care of patient: 45 mins. This time was spent on one or more of the following: performing physical exam; counseling and coordination of care; obtaining or reviewing history; documenting in the medical record; reviewing/ordering tests, medications or procedures; communicating with other healthcare professionals and discussing with patient's family/caregivers.    Current Length of Stay: 2 day(s)  Current Patient Status: Inpatient   Certification Statement: The patient will continue to require additional inpatient hospital stay due to above noted plan.      Code Status: Level 1 - Full Code    Subjective:   Patient found in bed, post cardiac catheterization, in no apparent distress.  Patient primarily speaks Slovak, but was able to answer yes and no to questions.  He was without complaint at time of exam and denied any chest pain or shortness of breath.     Objective:     Vitals:   Temp (24hrs), Av.1  °F (36.7 °C), Min:97.4 °F (36.3 °C), Max:98.5 °F (36.9 °C)    Temp:  [97.4 °F (36.3 °C)-98.5 °F (36.9 °C)] 97.4 °F (36.3 °C)  HR:  [] 79  Resp:  [16-18] 16  BP: (101-127)/(56-83) 127/83  SpO2:  [98 %-100 %] 100 %  Body mass index is 24.51 kg/m².     Input and Output Summary (last 24 hours):     Intake/Output Summary (Last 24 hours) at 9/4/2024 1345  Last data filed at 9/4/2024 0058  Gross per 24 hour   Intake --   Output 0 ml   Net 0 ml       Physical Exam:   Physical Exam  Constitutional:       General: He is not in acute distress.     Appearance: Normal appearance. He is not ill-appearing or toxic-appearing.   HENT:      Head: Normocephalic.      Mouth/Throat:      Mouth: Mucous membranes are moist.   Eyes:      Pupils: Pupils are equal, round, and reactive to light.   Cardiovascular:      Rate and Rhythm: Normal rate and regular rhythm.      Heart sounds: Normal heart sounds. No murmur heard.  Pulmonary:      Effort: Pulmonary effort is normal.      Breath sounds: Normal breath sounds.   Abdominal:      General: Abdomen is flat. There is no distension.      Palpations: Abdomen is soft.      Tenderness: There is no abdominal tenderness. There is no guarding.   Musculoskeletal:         General: No deformity or signs of injury.      Cervical back: Neck supple.   Skin:     General: Skin is warm and dry.      Comments: TR Band on right wrist with hemostasis.   Neurological:      Mental Status: He is alert and oriented to person, place, and time.   Psychiatric:         Mood and Affect: Mood normal.          Additional Data:     Labs:  Results from last 7 days   Lab Units 09/04/24  0225   WBC Thousand/uL 8.84   HEMOGLOBIN g/dL 12.3   HEMATOCRIT % 35.9*   PLATELETS Thousands/uL 300     Results from last 7 days   Lab Units 09/04/24  0225 09/03/24  0352   SODIUM mmol/L 139 139   POTASSIUM mmol/L 3.6 3.7   CHLORIDE mmol/L 107 107   CO2 mmol/L 23 22   BUN mg/dL 22 16   CREATININE mg/dL 1.10 0.95   ANION GAP mmol/L 9 10    CALCIUM mg/dL 9.0 9.2   ALBUMIN g/dL  --  4.1   TOTAL BILIRUBIN mg/dL  --  0.58   ALK PHOS U/L  --  62   ALT U/L  --  14   AST U/L  --  20   GLUCOSE RANDOM mg/dL 197* 216*     Results from last 7 days   Lab Units 09/02/24  1302   INR  1.00     Results from last 7 days   Lab Units 09/04/24  1110 09/04/24  0707 09/03/24  2039 09/03/24  1514 09/03/24  1036 09/03/24  0615 09/02/24  1551 09/02/24  1002 09/02/24  0944   POC GLUCOSE mg/dl 203* 190* 226* 169* 190* 233* 175* 201* 207*     Results from last 7 days   Lab Units 09/02/24  1006   HEMOGLOBIN A1C % 8.4*     Results from last 7 days   Lab Units 09/02/24  1006   PROCALCITONIN ng/ml <0.05       Lines/Drains:  Invasive Devices       Peripheral Intravenous Line  Duration             Peripheral IV 09/02/24 Left Antecubital 2 days    Peripheral IV 09/02/24 Right Antecubital 2 days                      Telemetry:  Telemetry Orders (From admission, onward)               24 Hour Telemetry Monitoring  Continuous x 24 Hours (Telem)        Question:  Reason for 24 Hour Telemetry  Answer:  PCI/EP study (including pacer and ICD implementation), Cardiac surgery, MI, abnormal cardiac cath, and chest pain- rule out MI                     Telemetry Reviewed: Normal Sinus Rhythm  Indication for Continued Telemetry Use: Acute MI/Unstable Angina/Rule out ACS             Imaging: Reviewed radiology reports from this admission including: CT head and ECHO and Personally reviewed the following imaging: Cardiac Catheterization    Recent Cultures (last 7 days):         Last 24 Hours Medication List:   Current Facility-Administered Medications   Medication Dose Route Frequency Provider Last Rate    amLODIPine  5 mg Oral Daily SRINIVAS Spear      aspirin  81 mg Oral Daily SRINIVAS Spear      atorvastatin  80 mg Oral Daily With Dinner Luz Maria Mitchell PA-C      chlorhexidine  15 mL Mouth/Throat Q12H KALYANI Myers PA-C      heparin (porcine)  3-20 Units/kg/hr  (Order-Specific) Intravenous Titrated Tiffanie Theodore,  Stopped (09/04/24 1331)    heparin (porcine)  1,650 Units Intravenous Q6H PRN SRINIVAS Spear      heparin (porcine)  3,300 Units Intravenous Q6H PRN AileenSRINIVAS Parson      hydrALAZINE  10 mg Intravenous Q6H PRN AileenSRINIVAS Parson      insulin lispro  1-5 Units Subcutaneous TID AC SRINIVAS Spear      lisinopril  20 mg Oral Daily SRINIVAS Spear      magnesium sulfate  4 g Intravenous Once SRINIVAS Wilkerson 4 g (09/04/24 1249)    mupirocin  1 Application Topical Q12H KALYANI Myers PA-C      nitroglycerin  0.4 mg Sublingual Q5 Min PRN SRINIVAS Spear          Today, Patient Was Seen By: SRINIVAS Wilkerson    **Please Note: This note may have been constructed using a voice recognition system.**

## 2024-09-04 NOTE — CASE MANAGEMENT
Case Management Assessment & Discharge Planning Note    Patient name Aristoteles Toribio Reyes  Location /-01 MRN [id number]  : 1965 Date 2024       Current Admission Date: 2024  Current Admission Diagnosis:Chest pain   Patient Active Problem List    Diagnosis Date Noted Date Diagnosed    Chest pain 2024     Stroke-like symptoms 2024     Accelerated hypertension 2024     Elevated troponin I level 2024     Hyperglycemia 2024     History of stroke in prior 3 months 2024     Type 2 diabetes mellitus, without long-term current use of insulin (HCC) 2024       LOS (days): 2  Geometric Mean LOS (GMLOS) (days): 2.6  Days to GMLOS:0.5     OBJECTIVE:    Risk of Unplanned Readmission Score: 8.76         Current admission status: Inpatient       Preferred Pharmacy:   [facility]  [place] [address] [address] [place] [address] [address] [facility] [address]  Phone: [phone] Fax: [phone]    Primary Care Provider: No primary care provider on file.    Primary Insurance: AETNA  Secondary Insurance:     ASSESSMENT:  Active Health Care Proxies    There are no active Health Care Proxies on file.       Advance Directives  Primary Contact: Cristel Doe (Son)  [phone]         Readmission Root Cause  30 Day Readmission: No    Patient Information  Admitted from:: Home  Mental Status: Alert  During Assessment patient was accompanied by: Not accompanied during assessment  Assessment information provided by:: Patient  Primary Caregiver: Self  Support Systems: Spouse/significant other  County of Residence: Other (specify in comment box)  What city do you live in?: [facility]  Home entry access options. Select all that apply.: No steps to enter home  Type of Current Residence: 2 story home  Upon entering residence, is there a bedroom on the main floor (no further steps)?: Yes  Upon entering residence, is there a bathroom on the main  floor (no further steps)?: Yes  Living Arrangements: Lives w/ Spouse/significant other  Is patient a ?: No    Activities of Daily Living Prior to Admission  Functional Status: Independent  Completes ADLs independently?: Yes  Ambulates independently?: Yes  Does patient use assisted devices?: No  Does patient currently own DME?: No  Does patient have a history of Outpatient Therapy (PT/OT)?: No  Does the patient have a history of Short-Term Rehab?: No  Does patient have a history of HHC?: No  Does patient currently have HHC?: No         Patient Information Continued  Income Source: SSI/SSD  Does patient have prescription coverage?: Yes  Does patient receive dialysis treatments?: No  Does patient have a history of substance abuse?: No  Does patient have a history of Mental Health Diagnosis?: No         Means of Transportation  Means of Transport to [facility]:: Family transport      Social Determinants of Health (SDOH)      Flowsheet Row Most Recent Value   Housing Stability    In the last 12 months, was there a time when you were not able to pay the mortgage or rent on time? N   In the past 12 months, how many times have you moved where you were living? 0   At any time in the past 12 months, were you homeless or living in a shelter (including now)? N   Transportation Needs    In the past 12 months, has lack of transportation kept you from medical appointments or from getting medications? no   In the past 12 months, has lack of transportation kept you from meetings, work, or from getting things needed for daily living? No   Food Insecurity    Within the past 12 months, you worried that your food would run out before you got the money to buy more. Never true   Within the past 12 months, the food you bought just didn't last and you didn't have money to get more. Never true   Utilities    In the past 12 months has the electric, gas, oil, or water company threatened to shut off services in your home? No             DISCHARGE DETAILS:    Discharge planning discussed with:: Pt and his family at bedside  Freedom of Choice: Yes  Comments - Freedom of Choice: CM met with pt and his family at bedside and introduced self/role. Pt is from [facility] and plans to be dc back home once medically cleared. As per pt, he is being transfered to hospitals due to open heart surgery. Medically Necessity form completed and placed in chart. CM dept continues to follow and assist with pt dc plans.  CM contacted family/caregiver?: Yes  Were Treatment Team discharge recommendations reviewed with patient/caregiver?: Yes  Did patient/caregiver verbalize understanding of patient care needs?: Yes  Were patient/caregiver advised of the risks associated with not following Treatment Team discharge recommendations?: Yes    Contacts  Patient Contacts: Anitra Calle-Apolonia (Spouse)   [phone]  Relationship to Patient:: Family  Contact Method: In Person  Reason/Outcome: Continuity of Care, Emergency Contact, Referral, Discharge Planning    Requested Home Health Care         Is the patient interested in HHC at discharge?: No    DME Referral Provided  Referral made for DME?: No    Other Referral/Resources/Interventions Provided:  Interventions: None Indicated    Would you like to participate in our [facility] service program?  : No - Declined    Treatment Team Recommendation: Home  Discharge Destination Plan:: Home  Transport at Discharge : Family

## 2024-09-05 ENCOUNTER — HOSPITAL ENCOUNTER (INPATIENT)
Facility: HOSPITAL | Age: 59
LOS: 10 days | Discharge: HOME HEALTH | End: 2024-09-15
Attending: HOSPITALIST | Admitting: THORACIC SURGERY (CARDIOTHORACIC VASCULAR SURGERY)
Payer: COMMERCIAL

## 2024-09-05 VITALS
RESPIRATION RATE: 18 BRPM | TEMPERATURE: 98.2 F | BODY MASS INDEX: 24.66 KG/M2 | OXYGEN SATURATION: 98 % | DIASTOLIC BLOOD PRESSURE: 61 MMHG | SYSTOLIC BLOOD PRESSURE: 102 MMHG | WEIGHT: 134 LBS | HEART RATE: 80 BPM | HEIGHT: 62 IN

## 2024-09-05 DIAGNOSIS — E11.9 TYPE 2 DIABETES MELLITUS WITHOUT COMPLICATION, WITHOUT LONG-TERM CURRENT USE OF INSULIN (HCC): ICD-10-CM

## 2024-09-05 DIAGNOSIS — I21.4 NSTEMI (NON-ST ELEVATED MYOCARDIAL INFARCTION) (HCC): Primary | ICD-10-CM

## 2024-09-05 DIAGNOSIS — I25.10 CAD, MULTIPLE VESSEL: ICD-10-CM

## 2024-09-05 DIAGNOSIS — Z95.1 S/P CABG X 2: ICD-10-CM

## 2024-09-05 DIAGNOSIS — E11.9 TYPE 2 DIABETES MELLITUS, WITHOUT LONG-TERM CURRENT USE OF INSULIN (HCC): ICD-10-CM

## 2024-09-05 DIAGNOSIS — R29.90 STROKE-LIKE SYMPTOMS: ICD-10-CM

## 2024-09-05 PROBLEM — I63.9 CVA (CEREBRAL VASCULAR ACCIDENT) (HCC): Status: ACTIVE | Noted: 2024-09-02

## 2024-09-05 LAB
ABO GROUP BLD: NORMAL
ABO GROUP BLD: NORMAL
ANION GAP SERPL CALCULATED.3IONS-SCNC: 9 MMOL/L (ref 4–13)
APTT PPP: 56 SECONDS (ref 23–34)
APTT PPP: 63 SECONDS (ref 23–34)
APTT PPP: 73 SECONDS (ref 23–34)
BLD GP AB SCN SERPL QL: NEGATIVE
BUN SERPL-MCNC: 21 MG/DL (ref 5–25)
CALCIUM SERPL-MCNC: 8.7 MG/DL (ref 8.4–10.2)
CHLORIDE SERPL-SCNC: 106 MMOL/L (ref 96–108)
CO2 SERPL-SCNC: 22 MMOL/L (ref 21–32)
CREAT SERPL-MCNC: 1.07 MG/DL (ref 0.6–1.3)
ERYTHROCYTE [DISTWIDTH] IN BLOOD BY AUTOMATED COUNT: 12.3 % (ref 11.6–15.1)
GFR SERPL CREATININE-BSD FRML MDRD: 76 ML/MIN/1.73SQ M
GLUCOSE SERPL-MCNC: 164 MG/DL (ref 65–140)
GLUCOSE SERPL-MCNC: 194 MG/DL (ref 65–140)
GLUCOSE SERPL-MCNC: 221 MG/DL (ref 65–140)
GLUCOSE SERPL-MCNC: 238 MG/DL (ref 65–140)
GLUCOSE SERPL-MCNC: 332 MG/DL (ref 65–140)
HCT VFR BLD AUTO: 36.6 % (ref 36.5–49.3)
HGB BLD-MCNC: 12.2 G/DL (ref 12–17)
MAGNESIUM SERPL-MCNC: 2 MG/DL (ref 1.9–2.7)
MCH RBC QN AUTO: 30.1 PG (ref 26.8–34.3)
MCHC RBC AUTO-ENTMCNC: 33.3 G/DL (ref 31.4–37.4)
MCV RBC AUTO: 90 FL (ref 82–98)
MRSA NOSE QL CULT: NORMAL
PLATELET # BLD AUTO: 296 THOUSANDS/UL (ref 149–390)
PMV BLD AUTO: 10.4 FL (ref 8.9–12.7)
POTASSIUM SERPL-SCNC: 3.9 MMOL/L (ref 3.5–5.3)
RBC # BLD AUTO: 4.05 MILLION/UL (ref 3.88–5.62)
RH BLD: POSITIVE
RH BLD: POSITIVE
SODIUM SERPL-SCNC: 137 MMOL/L (ref 135–147)
SPECIMEN EXPIRATION DATE: NORMAL
WBC # BLD AUTO: 7.09 THOUSAND/UL (ref 4.31–10.16)

## 2024-09-05 PROCEDURE — 80048 BASIC METABOLIC PNL TOTAL CA: CPT | Performed by: NURSE PRACTITIONER

## 2024-09-05 PROCEDURE — 85027 COMPLETE CBC AUTOMATED: CPT | Performed by: NURSE PRACTITIONER

## 2024-09-05 PROCEDURE — 99232 SBSQ HOSP IP/OBS MODERATE 35: CPT | Performed by: NURSE PRACTITIONER

## 2024-09-05 PROCEDURE — 85730 THROMBOPLASTIN TIME PARTIAL: CPT | Performed by: INTERNAL MEDICINE

## 2024-09-05 PROCEDURE — 86901 BLOOD TYPING SEROLOGIC RH(D): CPT | Performed by: INTERNAL MEDICINE

## 2024-09-05 PROCEDURE — 86900 BLOOD TYPING SEROLOGIC ABO: CPT | Performed by: INTERNAL MEDICINE

## 2024-09-05 PROCEDURE — 83735 ASSAY OF MAGNESIUM: CPT | Performed by: NURSE PRACTITIONER

## 2024-09-05 PROCEDURE — 82948 REAGENT STRIP/BLOOD GLUCOSE: CPT

## 2024-09-05 PROCEDURE — 99239 HOSP IP/OBS DSCHRG MGMT >30: CPT | Performed by: NURSE PRACTITIONER

## 2024-09-05 PROCEDURE — 99232 SBSQ HOSP IP/OBS MODERATE 35: CPT | Performed by: INTERNAL MEDICINE

## 2024-09-05 PROCEDURE — 86850 RBC ANTIBODY SCREEN: CPT | Performed by: INTERNAL MEDICINE

## 2024-09-05 PROCEDURE — 85730 THROMBOPLASTIN TIME PARTIAL: CPT | Performed by: NURSE PRACTITIONER

## 2024-09-05 PROCEDURE — NC001 PR NO CHARGE: Performed by: HOSPITALIST

## 2024-09-05 RX ORDER — NITROGLYCERIN 0.4 MG/1
0.4 TABLET SUBLINGUAL
Status: DISCONTINUED | OUTPATIENT
Start: 2024-09-05 | End: 2024-09-11

## 2024-09-05 RX ORDER — HYDRALAZINE HYDROCHLORIDE 20 MG/ML
10 INJECTION INTRAMUSCULAR; INTRAVENOUS EVERY 6 HOURS PRN
Status: CANCELLED | OUTPATIENT
Start: 2024-09-05

## 2024-09-05 RX ORDER — ATORVASTATIN CALCIUM 40 MG/1
80 TABLET, FILM COATED ORAL
Status: CANCELLED | OUTPATIENT
Start: 2024-09-05

## 2024-09-05 RX ORDER — ATORVASTATIN CALCIUM 80 MG/1
80 TABLET, FILM COATED ORAL
Status: DISCONTINUED | OUTPATIENT
Start: 2024-09-06 | End: 2024-09-11

## 2024-09-05 RX ORDER — HEPARIN SODIUM 1000 [USP'U]/ML
4800 INJECTION, SOLUTION INTRAVENOUS; SUBCUTANEOUS EVERY 6 HOURS PRN
Status: DISCONTINUED | OUTPATIENT
Start: 2024-09-05 | End: 2024-09-05 | Stop reason: HOSPADM

## 2024-09-05 RX ORDER — METOPROLOL SUCCINATE 25 MG/1
12.5 TABLET, EXTENDED RELEASE ORAL DAILY
Status: DISCONTINUED | OUTPATIENT
Start: 2024-09-06 | End: 2024-09-11

## 2024-09-05 RX ORDER — NITROGLYCERIN 0.4 MG/1
0.4 TABLET SUBLINGUAL
Status: CANCELLED | OUTPATIENT
Start: 2024-09-05

## 2024-09-05 RX ORDER — HEPARIN SODIUM 1000 [USP'U]/ML
2400 INJECTION, SOLUTION INTRAVENOUS; SUBCUTANEOUS EVERY 6 HOURS PRN
Status: CANCELLED | OUTPATIENT
Start: 2024-09-05

## 2024-09-05 RX ORDER — METOPROLOL SUCCINATE 25 MG/1
12.5 TABLET, EXTENDED RELEASE ORAL DAILY
Status: CANCELLED | OUTPATIENT
Start: 2024-09-06

## 2024-09-05 RX ORDER — HEPARIN SODIUM 1000 [USP'U]/ML
4800 INJECTION, SOLUTION INTRAVENOUS; SUBCUTANEOUS EVERY 6 HOURS PRN
Status: DISCONTINUED | OUTPATIENT
Start: 2024-09-05 | End: 2024-09-11

## 2024-09-05 RX ORDER — AMLODIPINE BESYLATE 5 MG/1
5 TABLET ORAL DAILY
Status: CANCELLED | OUTPATIENT
Start: 2024-09-06

## 2024-09-05 RX ORDER — HEPARIN SODIUM 1000 [USP'U]/ML
2400 INJECTION, SOLUTION INTRAVENOUS; SUBCUTANEOUS EVERY 6 HOURS PRN
Status: DISCONTINUED | OUTPATIENT
Start: 2024-09-05 | End: 2024-09-11

## 2024-09-05 RX ORDER — HEPARIN SODIUM 1000 [USP'U]/ML
2400 INJECTION, SOLUTION INTRAVENOUS; SUBCUTANEOUS EVERY 6 HOURS PRN
Status: DISCONTINUED | OUTPATIENT
Start: 2024-09-05 | End: 2024-09-05 | Stop reason: HOSPADM

## 2024-09-05 RX ORDER — LISINOPRIL 10 MG/1
10 TABLET ORAL DAILY
Status: DISCONTINUED | OUTPATIENT
Start: 2024-09-06 | End: 2024-09-06

## 2024-09-05 RX ORDER — INSULIN GLARGINE 100 [IU]/ML
6 INJECTION, SOLUTION SUBCUTANEOUS
Status: DISCONTINUED | OUTPATIENT
Start: 2024-09-05 | End: 2024-09-11

## 2024-09-05 RX ORDER — CHLORHEXIDINE GLUCONATE ORAL RINSE 1.2 MG/ML
15 SOLUTION DENTAL EVERY 12 HOURS SCHEDULED
Status: CANCELLED | OUTPATIENT
Start: 2024-09-05

## 2024-09-05 RX ORDER — INSULIN LISPRO 100 [IU]/ML
3 INJECTION, SOLUTION INTRAVENOUS; SUBCUTANEOUS
Status: DISCONTINUED | OUTPATIENT
Start: 2024-09-05 | End: 2024-09-11

## 2024-09-05 RX ORDER — AMLODIPINE BESYLATE 5 MG/1
5 TABLET ORAL DAILY
Status: DISCONTINUED | OUTPATIENT
Start: 2024-09-06 | End: 2024-09-11

## 2024-09-05 RX ORDER — HEPARIN SODIUM 10000 [USP'U]/100ML
3-30 INJECTION, SOLUTION INTRAVENOUS
Status: CANCELLED | OUTPATIENT
Start: 2024-09-05

## 2024-09-05 RX ORDER — CHLORHEXIDINE GLUCONATE ORAL RINSE 1.2 MG/ML
15 SOLUTION DENTAL EVERY 12 HOURS SCHEDULED
Status: DISCONTINUED | OUTPATIENT
Start: 2024-09-05 | End: 2024-09-11

## 2024-09-05 RX ORDER — HEPARIN SODIUM 1000 [USP'U]/ML
4800 INJECTION, SOLUTION INTRAVENOUS; SUBCUTANEOUS EVERY 6 HOURS PRN
Status: CANCELLED | OUTPATIENT
Start: 2024-09-05

## 2024-09-05 RX ORDER — INSULIN LISPRO 100 [IU]/ML
1-5 INJECTION, SOLUTION INTRAVENOUS; SUBCUTANEOUS
Status: DISCONTINUED | OUTPATIENT
Start: 2024-09-06 | End: 2024-09-11

## 2024-09-05 RX ORDER — HEPARIN SODIUM 1000 [USP'U]/ML
4800 INJECTION, SOLUTION INTRAVENOUS; SUBCUTANEOUS ONCE
Status: COMPLETED | OUTPATIENT
Start: 2024-09-05 | End: 2024-09-05

## 2024-09-05 RX ORDER — HEPARIN SODIUM 10000 [USP'U]/100ML
3-30 INJECTION, SOLUTION INTRAVENOUS
Status: DISCONTINUED | OUTPATIENT
Start: 2024-09-05 | End: 2024-09-11

## 2024-09-05 RX ORDER — INSULIN LISPRO 100 [IU]/ML
1-5 INJECTION, SOLUTION INTRAVENOUS; SUBCUTANEOUS
Status: CANCELLED | OUTPATIENT
Start: 2024-09-05

## 2024-09-05 RX ORDER — LISINOPRIL 10 MG/1
10 TABLET ORAL DAILY
Status: CANCELLED | OUTPATIENT
Start: 2024-09-06

## 2024-09-05 RX ORDER — HEPARIN SODIUM 10000 [USP'U]/100ML
3-30 INJECTION, SOLUTION INTRAVENOUS
Status: DISCONTINUED | OUTPATIENT
Start: 2024-09-05 | End: 2024-09-05 | Stop reason: HOSPADM

## 2024-09-05 RX ORDER — LISINOPRIL 10 MG/1
10 TABLET ORAL DAILY
Status: DISCONTINUED | OUTPATIENT
Start: 2024-09-06 | End: 2024-09-05 | Stop reason: HOSPADM

## 2024-09-05 RX ADMIN — INSULIN LISPRO 1 UNITS: 100 INJECTION, SOLUTION INTRAVENOUS; SUBCUTANEOUS at 08:08

## 2024-09-05 RX ADMIN — LISINOPRIL 20 MG: 20 TABLET ORAL at 12:16

## 2024-09-05 RX ADMIN — CHLORHEXIDINE GLUCONATE 0.12% ORAL RINSE 15 ML: 1.2 LIQUID ORAL at 21:04

## 2024-09-05 RX ADMIN — HEPARIN SODIUM 18 UNITS/KG/HR: 10000 INJECTION, SOLUTION INTRAVENOUS at 18:49

## 2024-09-05 RX ADMIN — INSULIN LISPRO 3 UNITS: 100 INJECTION, SOLUTION INTRAVENOUS; SUBCUTANEOUS at 19:03

## 2024-09-05 RX ADMIN — AMLODIPINE BESYLATE 5 MG: 5 TABLET ORAL at 12:17

## 2024-09-05 RX ADMIN — METOPROLOL SUCCINATE 12.5 MG: 25 TABLET, EXTENDED RELEASE ORAL at 12:17

## 2024-09-05 RX ADMIN — HEPARIN SODIUM 18 UNITS/KG/HR: 10000 INJECTION, SOLUTION INTRAVENOUS at 14:21

## 2024-09-05 RX ADMIN — HEPARIN SODIUM 4800 UNITS: 1000 INJECTION, SOLUTION INTRAVENOUS; SUBCUTANEOUS at 14:17

## 2024-09-05 RX ADMIN — INSULIN LISPRO 2 UNITS: 100 INJECTION, SOLUTION INTRAVENOUS; SUBCUTANEOUS at 12:18

## 2024-09-05 RX ADMIN — CHLORHEXIDINE GLUCONATE 0.12% ORAL RINSE 15 ML: 1.2 LIQUID ORAL at 08:08

## 2024-09-05 RX ADMIN — ASPIRIN 81 MG: 81 TABLET, COATED ORAL at 08:08

## 2024-09-05 RX ADMIN — INSULIN GLARGINE 6 UNITS: 100 INJECTION, SOLUTION SUBCUTANEOUS at 21:04

## 2024-09-05 NOTE — NURSING NOTE
Report called to Kayy at [facility]. All questions answered at this time. Heparin infusing at 18/u/kg/hr. All belongings except cell phone taken by family. Pt transported in stable condition.  
no chest pain, no cough, and no shortness of breath.

## 2024-09-05 NOTE — ASSESSMENT & PLAN NOTE
Patient presented with chest pain and numbness and tingling in right upper and lower extremities.  Patient maintained on stroke pathway  MRI completed, negative for acute ischemia   Continue with Aspirin, Statin

## 2024-09-05 NOTE — ASSESSMENT & PLAN NOTE
On CAT scan patient has old inferior cerebellar infarct  Does not have any focal logical deficit on exam  He ambulates without any assistance  Asa and statin  Repeat CTA head and neck no evidence of acute infarct  MRI unremarkable  Neurology signed off in [place]  Consider reconsulting neuro in [place] if there's a return of LLE weakness

## 2024-09-05 NOTE — ASSESSMENT & PLAN NOTE
Patient admitted with blood pressure more than 200 systolic  Admitted with retrosternal crushing chest pain since last night  EKG showed normal sinus rhythm  Patient was given IV NT hypertensives, sublingual nitro  Blood pressure later improved, troponin significantly elevated more than 500  Patient not sure about all of his medicines but he brought lisinopril, aspirin 325 mg   Patient started on amlodipine 5 mg, lisinopril 20 mg, and metoprolol succinate 12.5 mg daily; hydralazine 10 mg IV as needed for SBP greater than 160

## 2024-09-05 NOTE — ASSESSMENT & PLAN NOTE
Lab Results   Component Value Date    HGBA1C 8.4 (H) 09/02/2024       Recent Labs     09/04/24  1725 09/04/24  2123 09/05/24  0625 09/05/24  1054   POCGLU 189* 250* 194* 221*         Blood Sugar Average: Last 72 hrs:  (P) 205.9592722955047550  Will check glycohemoglobin  hold home dose of metformin  Insulin sliding scale

## 2024-09-05 NOTE — PROGRESS NOTES
"Cardiology Progress Note - Aristoteles Toribio Reyes 58 y.o. male MRN: [id number]    Unit/Bed#: -Er[facility] Encounter: [id number]      Assessment/Plan:     NSTEMI type I  He is pending transfer to [facility] for CABG evaluation.  Continue heparin drip, aspirin, statin, and amlodipine, Toprol    2.  Multivessel CAD  Plan per above    3.  Hypertension  Decrease lisinopril to 10 mg daily given soft BPs.  Continue amlodipine 5 mg daily, Toprol 12.5 mg daily    4.  Hyperlipidemia  Continue statin    5. DM2 (A1c 8.4)  Management per primary team    6.  Mild bilateral carotid artery stenosis  Continue aspirin, statin    7.  History of CVA      Subjective:   Patient seen and examined.  He denies chest pain today.    Objective:     Vitals: Blood pressure 113/65, pulse 86, temperature 97.8 °F (36.6 °C), resp. rate 16, height 5' 2\" (1.575 m), weight 60.8 kg (134 lb), SpO2 98%., Body mass index is 24.51 kg/m².,   Orthostatic Blood Pressures      Flowsheet Row Most Recent Value   Blood Pressure 113/65 filed at 09/05/2024 1216   Patient Position - Orthostatic VS Lying filed at 09/05/2024 0250              Intake/Output Summary (Last 24 hours) at 9/5/2024 1349  Last data filed at 9/5/2024 0225  Gross per 24 hour   Intake 0 ml   Output 0 ml   Net 0 ml         Physical Exam:   GEN: Alert and oriented x 3, in no acute distress.  Well appearing and well nourished.   HEENT: Sclera anicteric, conjunctivae pink, mucous membranes moist. Oropharynx clear.   NECK: Supple, no significant JVD. Trachea midline.   HEART: Regular rhythm, normal S1 and S2, no murmurs, clicks, gallops or rubs. PMI nondisplaced, no thrills.   LUNGS: Clear to auscultation bilaterally; no wheezes, rales, or rhonchi. No increased work of breathing or signs of respiratory distress.   ABDOMEN: Soft, nontender, non-distended.   EXTREMITIES: Skin warm and well perfused, no clubbing, cyanosis, or edema.  NEURO: No focal findings. Normal speech. Mood and affect normal.   SKIN: " Normal without suspicious lesions on exposed skin.      Medications:      Current Facility-Administered Medications:     amLODIPine (NORVASC) tablet 5 mg, 5 mg, Oral, Daily, AileenSRINIVAS Parson, 5 mg at 09/05/24 1217    aspirin (ECOTRIN LOW STRENGTH) EC tablet 81 mg, 81 mg, Oral, Daily, Aileen DU JerniganNP, 81 mg at 09/05/24 0808    atorvastatin (LIPITOR) tablet 80 mg, 80 mg, Oral, Daily With Dinner, Luz Maria Mitchell PA-C, 80 mg at 09/04/24 1629    chlorhexidine (PERIDEX) 0.12 % oral rinse 15 mL, 15 mL, Mouth/Throat, Q12H KALYANI, Luciano Myers PA-C, 15 mL at 09/05/24 0808    heparin (porcine) 25,000 units in 0.45% NaCl 250 mL infusion (premix), 3-20 Units/kg/hr (Order-Specific), Intravenous, Titrated, Tiffanie Theodore DO, Last Rate: 10.5 mL/hr at 09/05/24 0722, 19 Units/kg/hr at 09/05/24 0722    heparin (porcine) injection 1,650 Units, 1,650 Units, Intravenous, Q6H PRN, DU SpearNP, 1,650 Units at 09/04/24 2051    heparin (porcine) injection 3,300 Units, 3,300 Units, Intravenous, Q6H PRN, Aileen Carol Saenz CRNP, 3,300 Units at 09/02/24 2119    hydrALAZINE (APRESOLINE) injection 10 mg, 10 mg, Intravenous, Q6H PRN, Aileen Carol Saenz, CRNP    insulin lispro (HumALOG/ADMELOG) 100 units/mL subcutaneous injection 1-5 Units, 1-5 Units, Subcutaneous, TID AC, 2 Units at 09/05/24 1218 **AND** Fingerstick Glucose (POCT), , , TID AC, Aileen DU JerniganNP    lisinopril (ZESTRIL) tablet 20 mg, 20 mg, Oral, Daily, SRINIVAS Spear, 20 mg at 09/05/24 1216    metoprolol succinate (TOPROL-XL) 24 hr tablet 12.5 mg, 12.5 mg, Oral, Daily, Julián Cummins MD, 12.5 mg at 09/05/24 1217    nitroglycerin (NITROSTAT) SL tablet 0.4 mg, 0.4 mg, Sublingual, Q5 Min PRN, SRINIVAS Spear, 0.4 mg at 09/02/24 1432     Labs & Results:    Results from last 7 days   Lab Units 09/02/24  1458 09/02/24  1302 09/02/24  1006   HS TNI 0HR ng/L  --   --  517*   HS TNI 2HR ng/L  --  2,088*  --    HS TNI 4HR ng/L 2,187*  --   --     HSTNI D4 ng/L 1,670*  --   --      Results from last 7 days   Lab Units 09/05/24  0838 09/04/24  0225 09/03/24  0352   WBC Thousand/uL 7.09 8.84 10.03   HEMOGLOBIN g/dL 12.2 12.3 12.3   HEMATOCRIT % 36.6 35.9* 35.4*   PLATELETS Thousands/uL 296 300 289     Results from last 7 days   Lab Units 09/02/24  1006   TRIGLYCERIDES mg/dL 185*   HDL mg/dL 49     Results from last 7 days   Lab Units 09/05/24  0838 09/04/24  0225 09/03/24  0352   POTASSIUM mmol/L 3.9 3.6 3.7   CHLORIDE mmol/L 106 107 107   CO2 mmol/L 22 23 22   BUN mg/dL 21 22 16   CREATININE mg/dL 1.07 1.10 0.95   CALCIUM mg/dL 8.7 9.0 9.2   ALK PHOS U/L  --   --  62   ALT U/L  --   --  14   AST U/L  --   --  20     Results from last 7 days   Lab Units 09/05/24  1149 09/05/24  0352 09/04/24  1943 09/02/24  1944 09/02/24  1302 09/02/24  1006   INR   --   --   --   --  1.00 0.97   PTT seconds 56* 63* 44*   < > 29 29    < > = values in this interval not displayed.     Results from last 7 days   Lab Units 09/05/24  0838 09/04/24  1124 09/03/24  0352   MAGNESIUM mg/dL 2.0 1.5* 1.4*       ** Please Note: Fluency DirectDictation voice to text software may have been used in the creation of this document. **

## 2024-09-05 NOTE — CASE MANAGEMENT
Case Management Progress Note    Patient name Aristoteles Toribio Reyes  Location /-01 MRN [id number]  : 1965 Date 2024       LOS (days): 3  Geometric Mean LOS (GMLOS) (days): 1.8  Days to GMLOS:-1.2        OBJECTIVE:        Current admission status: Inpatient  Preferred Pharmacy:   [facility]  - [place] [address] [place] [address] [address] [facility] [address]  Phone: [phone] Fax: [phone]    Primary Care Provider: No primary care provider on file.    Primary Insurance: AETNA  Secondary Insurance:     PROGRESS NOTE:  Pt is awaiting transfer to [facility] for a CABAG. CM continues to follow and assist with pt dc plans.

## 2024-09-05 NOTE — ASSESSMENT & PLAN NOTE
Presented with substernal chest pain unresolved with nitro  Troponins elevated 0-hour 517, 2-hour 2088, 4-hour 2187  Patient is on aspirin 81 mg and Lipitor 80 mg  Cardiac Testing:  Echo: EF 50%  Catheterization: Multiple vessels with 80-90% occlusions  Transferred to [place] for CABG evaluation  Continue on telemetry

## 2024-09-05 NOTE — ASSESSMENT & PLAN NOTE
Typical chest pain with elevated troponin  NSTEMI  Heparin gtt per cardiology  Patient is on aspirin 325 mg after the stroke, continue with the same dose and Lipitor 80 mg  Echocardiogram performed, EF 50%

## 2024-09-05 NOTE — PLAN OF CARE
Problem: PAIN - ADULT  Goal: Verbalizes/displays adequate comfort level or baseline comfort level  Description: Interventions:  - Encourage patient to monitor pain and request assistance  - Assess pain using appropriate pain scale  - Administer analgesics based on type and severity of pain and evaluate response  - Implement non-pharmacological measures as appropriate and evaluate response  - Consider cultural and social influences on pain and pain management  - Notify physician/advanced practitioner if interventions unsuccessful or patient reports new pain  Outcome: Progressing     Problem: INFECTION - ADULT  Goal: Absence or prevention of progression during hospitalization  Description: INTERVENTIONS:  - Assess and monitor for signs and symptoms of infection  - Monitor lab/diagnostic results  - Monitor all insertion sites, i.e. indwelling lines, tubes, and drains  - Monitor endotracheal if appropriate and nasal secretions for changes in amount and color  - [place] appropriate cooling/warming therapies per order  - Administer medications as ordered  - Instruct and encourage patient and family to use good hand hygiene technique  - Identify and instruct in appropriate isolation precautions for identified infection/condition  Outcome: Progressing  Goal: Absence of fever/infection during neutropenic period  Description: INTERVENTIONS:  - Monitor WBC    Outcome: Progressing     Problem: SAFETY ADULT  Goal: Patient will remain free of falls  Description: INTERVENTIONS:  - Educate patient/family on patient safety including physical limitations  - Instruct patient to call for assistance with activity   - Consult OT/PT to assist with strengthening/mobility   - Keep Call bell within reach  - Keep bed low and locked with side rails adjusted as appropriate  - Keep care items and personal belongings within reach  - Initiate and maintain comfort rounds  - Make Fall Risk Sign visible to staff  - Offer Toileting every  Hours,  in advance of need  - Initiate/Maintain alarm  - Obtain necessary fall risk management equipment:   - Apply yellow socks and bracelet for high fall risk patients  - Consider moving patient to room near nurses station  Outcome: Progressing  Goal: Maintain or return to baseline ADL function  Description: INTERVENTIONS:  -  Assess patient's ability to carry out ADLs; assess patient's baseline for ADL function and identify physical deficits which impact ability to perform ADLs (bathing, care of mouth/teeth, toileting, grooming, dressing, etc.)  - Assess/evaluate cause of self-care deficits   - Assess range of motion  - Assess patient's mobility; develop plan if impaired  - Assess patient's need for assistive devices and provide as appropriate  - Encourage maximum independence but intervene and supervise when necessary  - Involve family in performance of ADLs  - Assess for home care needs following discharge   - Consider OT consult to assist with ADL evaluation and planning for discharge  - Provide patient education as appropriate  Outcome: Progressing  Goal: Maintains/Returns to pre admission functional level  Description: INTERVENTIONS:  - Perform AM-PAC 6 Click Basic Mobility/ Daily Activity assessment daily.  - Set and communicate daily mobility goal to care team and patient/family/caregiver.   - Collaborate with rehabilitation services on mobility goals if consulted  - Perform Range of Motion  times a day.  - Reposition patient every  hours.  - Dangle patient  times a day  - Stand patient  times a day  - Ambulate patient  times a day  - Out of bed to chair  times a day   - Out of bed for meals  times a day  - Out of bed for toileting  - Record patient progress and toleration of activity level   Outcome: Progressing     Problem: DISCHARGE PLANNING  Goal: Discharge to home or other facility with appropriate resources  Description: INTERVENTIONS:  - Identify barriers to discharge w/patient and caregiver  - Arrange for  needed discharge resources and transportation as appropriate  - Identify discharge learning needs (meds, wound care, etc.)  - Arrange for interpretive services to assist at discharge as needed  - Refer to Case Management Department for coordinating discharge planning if the patient needs post-hospital services based on physician/advanced practitioner order or complex needs related to functional status, cognitive ability, or social support system  Outcome: Progressing     Problem: Knowledge Deficit  Goal: Patient/family/caregiver demonstrates understanding of disease process, treatment plan, medications, and discharge instructions  Description: Complete learning assessment and assess knowledge base.  Interventions:  - Provide teaching at level of understanding  - Provide teaching via preferred learning methods  Outcome: Progressing     Problem: CARDIOVASCULAR - ADULT  Goal: Maintains optimal cardiac output and hemodynamic stability  Description: INTERVENTIONS:  - Monitor I/O, vital signs and rhythm  - Monitor for S/S and trends of decreased cardiac output  - Administer and titrate ordered vasoactive medications to optimize hemodynamic stability  - Assess quality of pulses, skin color and temperature  - Assess for signs of decreased coronary artery perfusion  - Instruct patient to report change in severity of symptoms  Outcome: Progressing  Goal: Absence of cardiac dysrhythmias or at baseline rhythm  Description: INTERVENTIONS:  - Continuous cardiac monitoring, vital signs, obtain 12 lead EKG if ordered  - Administer antiarrhythmic and heart rate control medications as ordered  - Monitor electrolytes and administer replacement therapy as ordered  Outcome: Progressing     Problem: METABOLIC, FLUID AND ELECTROLYTES - ADULT  Goal: Glucose maintained within target range  Description: INTERVENTIONS:  - Monitor Blood Glucose as ordered  - Assess for signs and symptoms of hyperglycemia and hypoglycemia  - Administer ordered  medications to maintain glucose within target range  - Assess nutritional intake and initiate nutrition service referral as needed  Outcome: Progressing     Problem: HEMATOLOGIC - ADULT  Goal: Maintains hematologic stability  Description: INTERVENTIONS  - Assess for signs and symptoms of bleeding or hemorrhage  - Monitor labs  - Administer supportive blood products/factors as ordered and appropriate  Outcome: Progressing     Problem: Nutrition/Hydration-ADULT  Goal: Nutrient/Hydration intake appropriate for improving, restoring or maintaining nutritional needs  Description: Monitor and assess patient's nutrition/hydration status for malnutrition. Collaborate with interdisciplinary team and initiate plan and interventions as ordered.  Monitor patient's weight and dietary intake as ordered or per policy. Utilize nutrition screening tool and intervene as necessary. Determine patient's food preferences and provide high-protein, high-caloric foods as appropriate.     INTERVENTIONS:  - Monitor oral intake, urinary output, labs, and treatment plans  - Assess nutrition and hydration status and recommend course of action  - Evaluate amount of meals eaten  - Assist patient with eating if necessary   - Allow adequate time for meals  - Recommend/ encourage appropriate diets, oral nutritional supplements, and vitamin/mineral supplements  - Order, calculate, and assess calorie counts as needed  - Recommend, monitor, and adjust tube feedings and TPN/PPN based on assessed needs  - Assess need for intravenous fluids  - Provide specific nutrition/hydration education as appropriate  - Include patient/family/caregiver in decisions related to nutrition  Outcome: Progressing

## 2024-09-05 NOTE — ASSESSMENT & PLAN NOTE
Lab Results   Component Value Date    HGBA1C 8.4 (H) 09/02/2024       Recent Labs     09/04/24  1625 09/04/24  1725 09/04/24  2123 09/05/24  0625   POCGLU 236* 189* 250* 194*       Blood Sugar Average: Last 72 hrs:    hold home dose of metformin    Will start 6U Lantus; Lispro 2U tid; SSI algo 1

## 2024-09-05 NOTE — LETTER
[facility] PPHP 3  [address] [address]  No information on file.    September 6, 2024     Patient: Aristoteles Toribio Reyes   YOB: 1965   Date of Visit: 9/5/2024       To Whom it May Concern:    Aristoteles Toribio Reyes is under my professional care. His spouse Anitra Barksdale has been out of work since 9/2/24 and will require to remain out of work until 9/27/24.     If you have any questions or concerns, please don't hesitate to call.         Sincerely,          SRINIVAS Jay

## 2024-09-05 NOTE — PROGRESS NOTES
"    INTERNAL MEDICINE RESIDENCY SENIOR ADMISSION NOTE     Name: Aristoteles Toribio Reyes   Age & Sex: 58 y.o. male   MRN: [id number]  Unit/Bed#: PPHP-321-01   Encounter: [id number]  Primary Care Provider: No primary care provider on file.    Admit to team: SOD Team A    Patient seen and examined. Reviewed H&P per Dr. Hernandez . Agree with the assessment and plan with any exception/addition as noted below:    58-year-old female past medical history of HTN, DM2 (A1C 8.4), initially admitted to [facility] 9/2 for retrosternal crushing squeezing chest pain.  Found to have elevated troponins to 2,187.  No ST changes found on EKG.  Patient underwent cardiac cath 9/4 showing multivessel disease.  Echo EF 50%.  Vas lower limb mapping completed at [facility] 9/4. Cardiology recommended transfer to [facility] for CABG eval.      On presentation there also concerns for strokelike symptoms due to initial left lower extremity weakness.  Initial NIHSS 1 for left lower extremity \"drift\" with repeat of 0 by neurology.  Patient underwent stroke workup with CTH showing chronic left cerebral infarct with no new abnormalities.  CTA negative.  MRI showing no acute processes.  Neurology signed off.    Patient seen and examined 9/5 after transfer. Pt Albanian speaking with multiple family members in room. Denies any CP/ SOB / n/v or trouble breathing. Stating no concerns at this time. Family states that this was an unexpected event and that they are from [place] and were visiting [facility] for a family get-a-away. Family / pt stating that patient plans to return to [place] in am for further follow-up. Given patient does not plan to follow up with [facility] on ID Sl notified of change and transfer orders placed. Solangeim will see in am.     Physical Exam:  General: No apparent distress, resting comfortably   Head: Normocephalic, atraumatic  Eyes: Anicteric, no conjunctival erythema  ENT: External ear normal, no nasal discharge  Neck: " Trachea midline, no visible lymphadenopathy or goiter  Respiratory: CTAB Non-labored respirations, symmetric thorax expansion  Cardiovascular: RRR no murmurs Extremities appear well-perfused  Abdomen: Non-distended  Extremities: Moves extremities spontaneously, no peripheral edema  Skin: No visible rashes, wounds, or jaundice  Neuro: A&O x 3, no gross focal deficits, no aphasia    Principal Problem:    CAD, multiple vessel  Active Problems:    HTN (hypertension)    NSTEMI (non-ST elevated myocardial infarction) ([facility])    CVA (cerebral vascular accident) ([facility])    Type 2 diabetes mellitus, without long-term current use of insulin ([facility])    Stroke-like symptoms old CVA on CT    Continue telemetry  Consult CTS, cardiology  Continue ASA, statin, BB,  Continue Heparin gtt  Continue Amlodipine  Will start 6U Lantus; Lispro 2U tid; SSI algo 1  Transfer to [facility] in am    Code Status: Level 1 - Full Code  Admission Status: INPATIENT   Disposition: Patient requires Med/Surg with Telemetry  Expected Length of Stay: 48-72hrs

## 2024-09-05 NOTE — ASSESSMENT & PLAN NOTE
Chronic, hypertensive urgency on admission, which is now resolved.  Will continue with current regimen with hold parameters  Monitor vital signs per unit routine

## 2024-09-05 NOTE — PROGRESS NOTES
[facility]  Progress Note  Name: Aristoteles Toribio Reyes I  MRN: [id number]  Unit/Bed#: -01 I Date of Admission: 9/2/2024   Date of Service: 9/5/2024 I Hospital Day: 3    Assessment & Plan   * CAD, multiple vessel  Assessment & Plan  Presented with substernal chest pain unresolved with nitro  Troponins elevated 0-hour 517, 2-hour 2088, 4-hour 2187  Patient is on aspirin 325 mg after the stroke, continue with the same dose and Lipitor 80 mg  Cardiac Testing:  Echo: EF 50%  Catheterization: Multiple vessels with 80-90% occlusions  Pending transfer to [place] for CABG.    Stroke-like symptoms  Assessment & Plan  Patient presented with chest pain and numbness and tingling in right upper and lower extremities.  Patient maintained on stroke pathway  MRI completed, negative for acute ischemia   Continue with Aspirin, Statin    Type 2 diabetes mellitus, without long-term current use of insulin ([facility])  Assessment & Plan  Lab Results   Component Value Date    HGBA1C 8.4 (H) 09/02/2024       Recent Labs     09/04/24  1725 09/04/24  2123 09/05/24  0625 09/05/24  1054   POCGLU 189* 250* 194* 221*         Blood Sugar Average: Last 72 hrs:  (P) [id number]  Will check glycohemoglobin  hold home dose of metformin  Insulin sliding scale    Elevated troponin I level  Assessment & Plan  See plan as noted above.    HTN (hypertension)  Assessment & Plan  Chronic, hypertensive urgency on admission, which is now resolved.  Will continue with current regimen with hold parameters  Monitor vital signs per unit routine           VTE Pharmacologic Prophylaxis:   High Risk (Score >/= 5) - Pharmacological DVT Prophylaxis Ordered: heparin drip. Sequential Compression Devices Ordered.    Mobility:   Basic Mobility Inpatient Raw Score: 24  JH-HLM Goal: 8: Walk 250 feet or more  JH-HLM Achieved: 7: Walk 25 feet or more  JH-HLM Goal achieved. Continue to encourage appropriate mobility.      Education and  Discussions with Family / Patient: Updated  (daughter) at bedside.    Total Time Spent on Date of Encounter in care of patient: 45 mins. This time was spent on one or more of the following: performing physical exam; counseling and coordination of care; obtaining or reviewing history; documenting in the medical record; reviewing/ordering tests, medications or procedures; communicating with other healthcare professionals and discussing with patient's family/caregivers.    Current Length of Stay: 3 day(s)  Current Patient Status: Inpatient   Certification Statement: The patient will continue to require additional inpatient hospital stay due to above outlined plan      Code Status: Level 1 - Full Code    Subjective:   Patient was found in hospital room sitting on side of bed in no acute distress.  Family present.  Patient offered no complaint at time of exam.  Denied chest pain, shortness of breath, headache, dizziness, nausea, vomiting.  Patient asked about when he would be transferred to [place], transfer process explained.  Patient also stated that his IV site was bleeding and was getting on his gown and bedding.  Small amount of blood around catheter hub, underneath dressing was noted and nursing staff was notified after completion of the exam.    Objective:     Vitals:   Temp (24hrs), Av °F (36.7 °C), Min:97.8 °F (36.6 °C), Max:98.2 °F (36.8 °C)    Temp:  [97.8 °F (36.6 °C)-98.2 °F (36.8 °C)] 97.8 °F (36.6 °C)  HR:  [67-88] 86  Resp:  [16] 16  BP: ()/(52-78) 113/65  SpO2:  [96 %-98 %] 98 %  Body mass index is 24.51 kg/m².     Input and Output Summary (last 24 hours):     Intake/Output Summary (Last 24 hours) at 2024 1319  Last data filed at 2024 0225  Gross per 24 hour   Intake 0 ml   Output 0 ml   Net 0 ml       Physical Exam:   Physical Exam  Constitutional:       General: He is not in acute distress.     Appearance: Normal appearance. He is not ill-appearing or toxic-appearing.    HENT:      Head: Normocephalic and atraumatic.      Mouth/Throat:      Mouth: Mucous membranes are moist.   Eyes:      Pupils: Pupils are equal, round, and reactive to light.   Cardiovascular:      Rate and Rhythm: Normal rate and regular rhythm.      Heart sounds: Normal heart sounds. No murmur heard.  Pulmonary:      Effort: Pulmonary effort is normal.      Breath sounds: Normal breath sounds.   Abdominal:      General: There is no distension.      Palpations: Abdomen is soft.      Tenderness: There is no abdominal tenderness. There is no guarding.   Musculoskeletal:         General: No deformity or signs of injury.      Cervical back: Neck supple.   Skin:     General: Skin is warm and dry.   Neurological:      Mental Status: He is alert and oriented to person, place, and time.   Psychiatric:         Mood and Affect: Mood normal.          Additional Data:     Labs:  Results from last 7 days   Lab Units 09/05/24  0838   WBC Thousand/uL 7.09   HEMOGLOBIN g/dL 12.2   HEMATOCRIT % 36.6   PLATELETS Thousands/uL 296     Results from last 7 days   Lab Units 09/05/24  0838 09/04/24  0225 09/03/24  0352   SODIUM mmol/L 137   < > 139   POTASSIUM mmol/L 3.9   < > 3.7   CHLORIDE mmol/L 106   < > 107   CO2 mmol/L 22   < > 22   BUN mg/dL 21   < > 16   CREATININE mg/dL 1.07   < > 0.95   ANION GAP mmol/L 9   < > 10   CALCIUM mg/dL 8.7   < > 9.2   ALBUMIN g/dL  --   --  4.1   TOTAL BILIRUBIN mg/dL  --   --  0.58   ALK PHOS U/L  --   --  62   ALT U/L  --   --  14   AST U/L  --   --  20   GLUCOSE RANDOM mg/dL 332*   < > 216*    < > = values in this interval not displayed.     Results from last 7 days   Lab Units 09/02/24  1302   INR  1.00     Results from last 7 days   Lab Units 09/05/24  1054 09/05/24  0625 09/04/24  2123 09/04/24  1725 09/04/24  1625 09/04/24  1110 09/04/24  0707 09/03/24  2039 09/03/24  1514 09/03/24  1036 09/03/24  0615 09/02/24  1551   POC GLUCOSE mg/dl 221* 194* 250* 189* 236* 203* 190* 226* 169* 190* 233*  175*     Results from last 7 days   Lab Units 09/02/24  1006   HEMOGLOBIN A1C % 8.4*     Results from last 7 days   Lab Units 09/02/24  1006   PROCALCITONIN ng/ml <0.05       Lines/Drains:  Invasive Devices       Peripheral Intravenous Line  Duration             Peripheral IV 09/02/24 Left Antecubital 3 days    Peripheral IV 09/02/24 Right Antecubital 3 days                      Telemetry:  Telemetry Orders (From admission, onward)               24 Hour Telemetry Monitoring  Continuous x 24 Hours (Telem)        Expiring   Question:  Reason for 24 Hour Telemetry  Answer:  PCI/EP study (including pacer and ICD implementation), Cardiac surgery, MI, abnormal cardiac cath, and chest pain- rule out MI                     Telemetry Reviewed: Normal Sinus Rhythm  Indication for Continued Telemetry Use: Acute MI/Unstable Angina/Rule out ACS             Imaging: No pertinent imaging reviewed.    Recent Cultures (last 7 days):         Last 24 Hours Medication List:   Current Facility-Administered Medications   Medication Dose Route Frequency Provider Last Rate    amLODIPine  5 mg Oral Daily SRINIVAS Spear      aspirin  81 mg Oral Daily SRINIVAS Spear      atorvastatin  80 mg Oral Daily With Dinner Luz Maria Mitchell PA-C      chlorhexidine  15 mL Mouth/Throat Q12H [facility] Luciano Myers PA-C      heparin (porcine)  3-20 Units/kg/hr (Order-Specific) Intravenous Titrated Tiffanie Theodore DO 19 Units/kg/hr (09/05/24 0722)    heparin (porcine)  1,650 Units Intravenous Q6H PRN AileenSRINIVAS Parson      heparin (porcine)  3,300 Units Intravenous Q6H PRN SRINIVAS Spear      hydrALAZINE  10 mg Intravenous Q6H PRN SRINIVAS Spear      insulin lispro  1-5 Units Subcutaneous TID AC SRINIVAS Spear      lisinopril  20 mg Oral Daily SRINIVAS Spear      metoprolol succinate  12.5 mg Oral Daily Julián Cummins MD      nitroglycerin  0.4 mg Sublingual Q5 Min PRN SRINIVAS Spear           Today, Patient Was Seen By: SRINIVAS Wilkerson    **Please Note: This note may have been constructed using a voice recognition system.**

## 2024-09-05 NOTE — ASSESSMENT & PLAN NOTE
Patient presented with chest pain and numbness and tingling in right upper and lower extremities.  (Since resolved)    Neurology initially consulted in [facility]; has since signed off  CT head showed no acute pathology  S/p aspirin load, baby aspirin  MRI unremarkable compared to CTH  Cw Atorvastatin   TTE EF 50%  Cw Telemetry  Qshift neuro checks  A1C 8.4  PT/OT/ST

## 2024-09-05 NOTE — PLAN OF CARE
Problem: PAIN - ADULT  Goal: Verbalizes/displays adequate comfort level or baseline comfort level  Description: Interventions:  - Encourage patient to monitor pain and request assistance  - Assess pain using appropriate pain scale  - Administer analgesics based on type and severity of pain and evaluate response  - Implement non-pharmacological measures as appropriate and evaluate response  - Consider cultural and social influences on pain and pain management  - Notify physician/advanced practitioner if interventions unsuccessful or patient reports new pain  Outcome: Progressing     Problem: INFECTION - ADULT  Goal: Absence or prevention of progression during hospitalization  Description: INTERVENTIONS:  - Assess and monitor for signs and symptoms of infection  - Monitor lab/diagnostic results  - Monitor all insertion sites, i.e. indwelling lines, tubes, and drains  - Monitor endotracheal if appropriate and nasal secretions for changes in amount and color  - [place] appropriate cooling/warming therapies per order  - Administer medications as ordered  - Instruct and encourage patient and family to use good hand hygiene technique  - Identify and instruct in appropriate isolation precautions for identified infection/condition  Outcome: Progressing  Goal: Absence of fever/infection during neutropenic period  Description: INTERVENTIONS:  - Monitor WBC    Outcome: Progressing     Problem: SAFETY ADULT  Goal: Patient will remain free of falls  Description: INTERVENTIONS:  - Educate patient/family on patient safety including physical limitations  - Instruct patient to call for assistance with activity   - Consult OT/PT to assist with strengthening/mobility   - Keep Call bell within reach  - Keep bed low and locked with side rails adjusted as appropriate  - Keep care items and personal belongings within reach  - Initiate and maintain comfort rounds  - Make Fall Risk Sign visible to staff  - Offer Toileting every  Hours,  in advance of need  - Initiate/Maintain alarm  - Obtain necessary fall risk management equipment:   - Apply yellow socks and bracelet for high fall risk patients  - Consider moving patient to room near nurses station  Outcome: Progressing  Goal: Maintain or return to baseline ADL function  Description: INTERVENTIONS:  -  Assess patient's ability to carry out ADLs; assess patient's baseline for ADL function and identify physical deficits which impact ability to perform ADLs (bathing, care of mouth/teeth, toileting, grooming, dressing, etc.)  - Assess/evaluate cause of self-care deficits   - Assess range of motion  - Assess patient's mobility; develop plan if impaired  - Assess patient's need for assistive devices and provide as appropriate  - Encourage maximum independence but intervene and supervise when necessary  - Involve family in performance of ADLs  - Assess for home care needs following discharge   - Consider OT consult to assist with ADL evaluation and planning for discharge  - Provide patient education as appropriate  Outcome: Progressing  Goal: Maintains/Returns to pre admission functional level  Description: INTERVENTIONS:  - Perform AM-PAC 6 Click Basic Mobility/ Daily Activity assessment daily.  - Set and communicate daily mobility goal to care team and patient/family/caregiver.   - Collaborate with rehabilitation services on mobility goals if consulted  - Perform Range of Motion  times a day.  - Reposition patient every  hours.  - Dangle patient  times a day  - Stand patient  times a day  - Ambulate patient  times a day  - Out of bed to chair  times a day   - Out of bed for meals  times a day  - Out of bed for toileting  - Record patient progress and toleration of activity level   Outcome: Progressing     Problem: DISCHARGE PLANNING  Goal: Discharge to home or other facility with appropriate resources  Description: INTERVENTIONS:  - Identify barriers to discharge w/patient and caregiver  - Arrange for  needed discharge resources and transportation as appropriate  - Identify discharge learning needs (meds, wound care, etc.)  - Arrange for interpretive services to assist at discharge as needed  - Refer to Case Management Department for coordinating discharge planning if the patient needs post-hospital services based on physician/advanced practitioner order or complex needs related to functional status, cognitive ability, or social support system  Outcome: Progressing     Problem: Knowledge Deficit  Goal: Patient/family/caregiver demonstrates understanding of disease process, treatment plan, medications, and discharge instructions  Description: Complete learning assessment and assess knowledge base.  Interventions:  - Provide teaching at level of understanding  - Provide teaching via preferred learning methods  Outcome: Progressing     Problem: CARDIOVASCULAR - ADULT  Goal: Maintains optimal cardiac output and hemodynamic stability  Description: INTERVENTIONS:  - Monitor I/O, vital signs and rhythm  - Monitor for S/S and trends of decreased cardiac output  - Administer and titrate ordered vasoactive medications to optimize hemodynamic stability  - Assess quality of pulses, skin color and temperature  - Assess for signs of decreased coronary artery perfusion  - Instruct patient to report change in severity of symptoms  Outcome: Progressing  Goal: Absence of cardiac dysrhythmias or at baseline rhythm  Description: INTERVENTIONS:  - Continuous cardiac monitoring, vital signs, obtain 12 lead EKG if ordered  - Administer antiarrhythmic and heart rate control medications as ordered  - Monitor electrolytes and administer replacement therapy as ordered  Outcome: Progressing     Problem: METABOLIC, FLUID AND ELECTROLYTES - ADULT  Goal: Glucose maintained within target range  Description: INTERVENTIONS:  - Monitor Blood Glucose as ordered  - Assess for signs and symptoms of hyperglycemia and hypoglycemia  - Administer ordered  medications to maintain glucose within target range  - Assess nutritional intake and initiate nutrition service referral as needed  Outcome: Progressing     Problem: HEMATOLOGIC - ADULT  Goal: Maintains hematologic stability  Description: INTERVENTIONS  - Assess for signs and symptoms of bleeding or hemorrhage  - Monitor labs  - Administer supportive blood products/factors as ordered and appropriate  Outcome: Progressing     Problem: Nutrition/Hydration-ADULT  Goal: Nutrient/Hydration intake appropriate for improving, restoring or maintaining nutritional needs  Description: Monitor and assess patient's nutrition/hydration status for malnutrition. Collaborate with interdisciplinary team and initiate plan and interventions as ordered.  Monitor patient's weight and dietary intake as ordered or per policy. Utilize nutrition screening tool and intervene as necessary. Determine patient's food preferences and provide high-protein, high-caloric foods as appropriate.     INTERVENTIONS:  - Monitor oral intake, urinary output, labs, and treatment plans  - Assess nutrition and hydration status and recommend course of action  - Evaluate amount of meals eaten  - Assist patient with eating if necessary   - Allow adequate time for meals  - Recommend/ encourage appropriate diets, oral nutritional supplements, and vitamin/mineral supplements  - Order, calculate, and assess calorie counts as needed  - Recommend, monitor, and adjust tube feedings and TPN/PPN based on assessed needs  - Assess need for intravenous fluids  - Provide specific nutrition/hydration education as appropriate  - Include patient/family/caregiver in decisions related to nutrition  Outcome: Progressing

## 2024-09-05 NOTE — ASSESSMENT & PLAN NOTE
Presented with substernal chest pain unresolved with nitro  Troponins elevated 0-hour 517, 2-hour 2088, 4-hour 2187  Patient is on aspirin 325 mg after the stroke, continue with the same dose and Lipitor 80 mg  Cardiac Testing:  Echo: EF 50%  Catheterization: Multiple vessels with 80-90% occlusions  Pending transfer to [facility] for CABG.

## 2024-09-05 NOTE — UTILIZATION REVIEW
Continued Stay Review    Date: 9/5                          Current Patient Class: Inpatient  Current Level of Care: Med/surg    HPI:58 y.o. male initially admitted on 9/2     Assessment/Plan:   Pending transfer to [facility] for CABG. Continue with IV heparin drip, asa, statin, toprol, amlodipine.  Lungs clear.  Cardiology following. Denies complaints.     9/4 Cardiac cath:  Impression      Prox LAD to Mid LAD lesion is 80% stenosed.    Prox RCA to Mid RCA lesion is 80% stenosed.    Mid Cx lesion is 40% stenosed.     Multivessel CA; severe multivessel diffuse disease   Mid RCA 80% segmental stenosis   Prox to mid LAD diffuse 80-90% stenosis might have decent landing LIMA target distally  Diagonal 1 has 80% but smaller caliber   Lcx/Om1 has 30% stenosis        Vital Signs (last 3 days)       Date/Time Temp Pulse Resp BP MAP (mmHg) SpO2 O2 Flow Rate (L/min) O2 Device Patient Position - Orthostatic VS [place] Coma Scale Score Pain    09/05/24 1216 -- 86 -- 113/65 -- -- -- -- -- -- --    09/05/24 1138 97.8 °F (36.6 °C) 79 -- 113/65 82 -- -- -- -- -- --    09/05/24 0808 -- -- -- -- -- -- -- -- -- 15 No Pain    09/05/24 0710 -- 67 16 91/52 -- 98 % -- -- -- -- --    09/05/24 0250 98.2 °F (36.8 °C) 77 -- 101/55 -- 98 % -- -- Lying -- --    09/04/24 2300 97.8 °F (36.6 °C) 86 -- 95/54 -- 96 % -- -- Lying -- --    09/04/24 1930 -- -- -- -- -- -- -- -- -- 15 No Pain    09/04/24 1900 -- 88 -- 131/78 -- -- -- -- Lying -- No Pain    09/04/24 1603 -- -- -- -- -- -- -- -- -- 15 No Pain    09/04/24 1500 98.2 °F (36.8 °C) 83 -- 110/56 -- -- -- -- Lying -- No Pain    09/04/24 1415 -- 85 16 120/61 -- -- -- -- Lying -- No Pain    09/04/24 1315 -- 85 16 114/69 -- -- -- -- Lying -- No Pain    09/04/24 1245 -- 84 16 118/68 -- -- -- -- Lying -- No Pain    09/04/24 1215 -- 84 16 107/65 -- -- -- -- Lying -- No Pain    09/04/24 1200 -- 79 16 104/66 -- -- -- -- Lying -- No Pain    09/04/24 1145 -- 98 16 157/71 -- -- -- -- Lying -- No  Pain    09/04/24 1142 -- -- -- -- -- -- -- -- -- 15 No Pain    09/04/24 1130 -- 92 16 113/71 -- -- -- -- Lying -- No Pain    09/04/24 10:16:50 -- -- -- -- -- -- -- -- -- -- No Pain    09/04/24 09:36:48 -- -- -- -- -- -- -- -- -- -- No Pain    09/04/24 09:36:17 -- -- -- -- -- -- 2 L/min EtCO2 nasal cannula -- -- --    09/04/24 0850 97.4 °F (36.3 °C) 79 16 127/83 -- 100 % -- None (Room air) -- -- No Pain    09/04/24 0727 -- -- -- -- -- -- -- -- -- 15 No Pain    09/04/24 0713 97.9 °F (36.6 °C) 79 18 118/72 90 100 % -- None (Room air) Lying -- --    09/04/24 0231 -- 78 16 102/67 80 99 % -- None (Room air) Lying -- --    09/04/24 0206 -- -- -- -- -- 99 % -- -- -- -- --    09/03/24 2240 98.5 °F (36.9 °C) 89 18 101/56 71 98 % -- None (Room air) Lying -- --    09/03/24 2100 -- -- -- -- -- -- -- -- -- 15 No Pain    09/03/24 1854 -- 94 17 116/62 82 99 % -- None (Room air) Lying -- --    09/03/24 1528 98.4 °F (36.9 °C) 100 17 110/63 78 98 % -- None (Room air) Lying -- --    09/03/24 1102 -- 79 18 131/75 -- 100 % -- -- Lying -- --    09/03/24 0934 -- -- -- -- -- -- -- -- -- 15 No Pain    09/03/24 0924 98.3 °F (36.8 °C) 61 18 -- -- 92 % -- None (Room air) -- -- --    09/03/24 0857 -- -- -- 133/79 -- -- -- -- -- -- --    09/03/24 0845 -- -- -- -- -- -- -- -- -- 15 No Pain    09/03/24 0702 98.4 °F (36.9 °C) 82 18 106/61 -- 98 % -- -- Lying -- --    09/02/24 2200 98.2 °F (36.8 °C) 81 16 115/76 91 96 % -- -- Lying 15 No Pain    09/02/24 1846 -- 83 18 118/78 94 98 % -- -- Lying -- --    09/02/24 1759 -- 86 13 128/69 -- -- -- -- Lying -- --    09/02/24 1500 -- 79 22 174/92 125 100 % -- None (Room air) Lying -- --    09/02/24 1434 97.8 °F (36.6 °C) 82 22 166/93 -- 98 % -- None (Room air) -- -- 7    09/02/24 1345 -- 73 27 172/88 -- 97 % -- -- -- 15 --    09/02/24 1340 -- 70 25 -- -- 100 % -- -- -- -- --    09/02/24 1335 -- 79 25 -- -- 100 % -- -- -- -- --    09/02/24 1330 -- 75 17 171/95 -- 98 % -- -- -- 15 --    09/02/24 1325 -- 69 22  -- -- 98 % -- -- -- -- --    09/02/24 1320 -- 72 18 -- -- 99 % -- -- -- -- --    09/02/24 1315 -- 70 20 179/97 -- 97 % -- -- -- 15 --    09/02/24 1310 -- 69 23 -- -- 99 % -- -- -- -- --    09/02/24 1305 -- 69 24 -- -- 99 % -- -- -- -- --    09/02/24 1300 -- 69 19 161/95 -- 99 % -- -- -- 15 --    09/02/24 1255 -- 67 21 -- -- 100 % -- -- -- -- --    09/02/24 1250 -- 69 16 -- -- 100 % -- -- -- -- --    09/02/24 1245 -- 70 20 153/87 -- 96 % -- -- -- 15 --    09/02/24 1240 -- 67 22 -- -- 99 % -- -- -- -- --    09/02/24 1235 -- 66 19 -- -- 96 % -- -- -- -- --    09/02/24 1230 -- 65 21 175/94 -- 96 % -- -- -- 15 --    09/02/24 1225 -- 65 23 -- -- 97 % -- -- -- -- --    09/02/24 1220 -- 65 23 -- -- 96 % -- -- -- -- --    09/02/24 1215 -- 63 18 155/85 -- 100 % -- -- -- 15 --    09/02/24 1210 -- 65 20 -- -- 95 % -- -- -- -- --    09/02/24 1205 -- 67 20 -- -- 94 % -- -- -- -- --    09/02/24 1200 -- 67 18 165/85 -- 95 % -- -- -- 15 --    09/02/24 1155 -- 63 20 -- -- 96 % -- -- -- -- --    09/02/24 1150 -- 62 20 -- -- 95 % -- -- -- -- --    09/02/24 1145 -- 64 18 180/101 -- 99 % -- -- -- 15 --    09/02/24 1140 -- 63 20 -- -- 95 % -- -- -- -- --    09/02/24 1135 -- 62 18 -- -- 97 % -- -- -- -- --    09/02/24 1130 -- 64 16 180/101 -- 99 % -- -- -- 15 --    09/02/24 1125 -- 62 13 -- -- 99 % -- -- -- -- --    09/02/24 1120 -- 61 20 -- -- 94 % -- -- -- -- --    09/02/24 1115 -- 62 21 172/94 -- 95 % -- -- -- 15 --    09/02/24 1110 -- 62 20 -- -- 93 % -- -- -- -- --    09/02/24 1105 -- 63 19 -- -- 91 % -- -- -- -- --    09/02/24 1100 -- 66 21 146/88 112 95 % -- None (Room air) -- 15 --    09/02/24 1055 -- 65 19 -- -- 92 % -- -- -- -- --    09/02/24 1050 -- 67 19 -- -- 93 % -- -- -- -- --    09/02/24 10:45:02 -- 87 18 169/96 -- 97 % -- None (Room air) -- 15 --    09/02/24 1045 -- 75 18 169/96 -- 93 % -- -- -- -- --    09/02/24 1040 -- 69 10 -- -- 97 % -- -- -- -- --    09/02/24 1035 -- 73 21 -- -- 96 % -- -- -- -- --    09/02/24 1031 --  -- -- -- -- -- -- -- -- -- 7    09/02/24 10:30:14 -- 75 18 199/106 -- 100 % -- -- -- 15 --    09/02/24 1030 -- 74 23 199/106 -- 100 % -- -- -- -- --    09/02/24 1025 -- 70 22 -- -- 96 % -- -- -- -- --    09/02/24 1020 -- 72 22 -- -- 95 % -- -- -- -- --    09/02/24 1015 -- 70 18 222/107 -- 99 % -- Nasal cannula -- 15 --    09/02/24 1010 -- 69 21 -- -- 100 % -- -- -- -- --    09/02/24 1005 -- 71 44 -- -- 98 % -- -- -- -- --    09/02/24 1000 97.7 °F (36.5 °C) 72 18 229/107 -- 99 % -- -- -- 15 --          Weight (last 2 days)       Date/Time Weight    09/04/24 1200 60.8 (134)              Pertinent Labs/Diagnostic Results:   Radiology:  VAS carotid complete study   Final Interpretation by Danial Ba DO (09/04 2146)      VAS lower limb vein mapping bypass graft   Final Interpretation by Danial Ba DO (09/04 2145)      MRI brain wo contrast   Final Interpretation by Ernesto Hardin MD (09/03 1600)      1. No acute process seen   2. Susceptibility focus in the right periatrial white matter corresponds to the calcification on head CT. This is nonspecific and may be due to prior granulomatous process or cavernous malformation.      Workstation performed: [id number]         CTA dissection protocol chest/abdomen/pelvis   Final Interpretation by Konstantin Contreras MD (09/02 3834)      No acute findings in the chest, abdomen, or pelvis. Specifically, no findings of aortic aortic syndrome.                  Resident: HERON BEST I, the attending radiologist, have reviewed the images and agree with the final report above.      Workstation performed: [id number]         CTA stroke alert (head/neck)   Final Interpretation by Masha Callaway MD (09/02 1045)      1.   Patent major vessels of the [place] of cason without high-grade stenosis.  No aneurysm.   2.  Atherosclerotic changes without hemodynamically significant stenosis in the cervical carotid or vertebral arteries.               I personally  discussed this study with JAMES REDMOND on 9/2/2024 10: 41 AM.               Workstation performed: [id number]         CT stroke alert brain   Final Interpretation by Masha Callaway MD (09/02 1229)   Addendum (preliminary) 1 of 1 by Masha Callaway MD (09/02 1229)   ADDENDUM:      There is nonspecific 5 mm calcification versus calcific lesion in the    right temporo-occipital junction (series 2 image 20). Recommend further    characterization with brain MRI without and with contrast if no prior    outside imaging is available.         This study was marked in EPIC for notification and follow-up.               Final      1.  No acute intracranial CT abnormality.   2.  Chronic appearing inferior left cerebellar infarct.                     I personally discussed this study with JAMES REDMOND on 9/2/2024 10:    41 AM.         Workstation performed: [id number]           Cardiology:  Echo complete w/ contrast if indicated   Final Result by Meron Wynn MD (09/04 1320)        Left Ventricle: Left ventricular cavity size is normal. Wall thickness    is normal. The left ventricular ejection fraction is 50%. Systolic    function is low normal.  Mild septal and apical hypokinesis. Diastolic    function is mildly abnormal, consistent with grade I (abnormal)    relaxation.         Cardiac catheterization   Final Result by Juan Fuentes MD (09/05 1356)   Addendum (preliminary) 1 of 1 by Juan Fuentes MD (09/05 1356)        Prox LAD to Mid LAD lesion is 80% stenosed.     Prox RCA to Mid RCA lesion is 80% stenosed.     Mid Cx lesion is 40% stenosed.      Multivessel CA; severe multivessel diffuse disease    Mid RCA 80% segmental stenosis    Prox to mid LAD diffuse 80-90% stenosis might have decent landing LIMA    target distally   Diagonal 1 has 80% but smaller caliber    Lcx/Om1 has 30% stenosis       LVEDP 12mmHg   Right radial access status post TR band         ECG 12 lead   Final Result by Julián Cummins MD  (09/04 1227)   Normal sinus rhythm      Confirmed by Julián Cummins ([id number]) on 9/4/2024 12:27:48 PM      ECG 12 lead   Final Result by Jack Baptiste MD (09/02 1743)   Normal sinus rhythm with sinus arrhythmia   Normal ECG   When compared with ECG of 02-SEP-2024 10:02, (unconfirmed)   No significant change was found   Confirmed by Jack Baptiste ([id number]) on 9/2/2024 5:43:34 PM      ECG 12 lead   Final Result by Jack Baptiste MD (09/02 1743)   Normal sinus rhythm   Normal ECG   No previous ECGs available   Confirmed by Jack Baptiste ([id number]) on 9/2/2024 5:43:28 PM        GI:  No orders to display       Results from last 7 days   Lab Units 09/02/24  1007   SARS-COV-2  Negative     Results from last 7 days   Lab Units 09/05/24  0838 09/04/24  0225 09/03/24  0352 09/02/24  1302 09/02/24  1006   WBC Thousand/uL 7.09 8.84 10.03 14.50* 10.17*   HEMOGLOBIN g/dL 12.2 12.3 12.3 11.8* 12.5   HEMATOCRIT % 36.6 35.9* 35.4* 34.1* 36.4*   PLATELETS Thousands/uL 296 300 289 301 311         Results from last 7 days   Lab Units 09/05/24  0838 09/04/24  1124 09/04/24  0225 09/03/24  0352 09/02/24  1006   SODIUM mmol/L 137  --  139 139 139   POTASSIUM mmol/L 3.9  --  3.6 3.7 3.7   CHLORIDE mmol/L 106  --  107 107 107   CO2 mmol/L 22  --  23 22 20*   ANION GAP mmol/L 9  --  9 10 12   BUN mg/dL 21  --  22 16 18   CREATININE mg/dL 1.07  --  1.10 0.95 0.94   EGFR ml/min/1.73sq m 76  --  73 87 89   CALCIUM mg/dL 8.7  --  9.0 9.2 9.6   MAGNESIUM mg/dL 2.0 1.5*  --  1.4*  --    PHOSPHORUS mg/dL  --   --   --  2.9  --      Results from last 7 days   Lab Units 09/03/24  0352   AST U/L 20   ALT U/L 14   ALK PHOS U/L 62   TOTAL PROTEIN g/dL 6.5   ALBUMIN g/dL 4.1   TOTAL BILIRUBIN mg/dL 0.58     Results from last 7 days   Lab Units 09/05/24  1054 09/05/24  0625 09/04/24  2123 09/04/24  1725 09/04/24  1625 09/04/24  1110 09/04/24  0707 09/03/24  2039 09/03/24  1514 09/03/24  1036 09/03/24  0615 09/02/24  1551   POC GLUCOSE mg/dl 221* 194*  250* 189* 236* 203* 190* 226* 169* 190* 233* 175*     Results from last 7 days   Lab Units 09/05/24  0838 09/04/24  0225 09/03/24  0352 09/02/24  1006   GLUCOSE RANDOM mg/dL 332* 197* 216* 210*         Results from last 7 days   Lab Units 09/02/24  1006   HEMOGLOBIN A1C % 8.4*   EAG mg/dl 194       Results from last 7 days   Lab Units 09/02/24  1458 09/02/24  1302 09/02/24  1006   HS TNI 0HR ng/L  --   --  517*   HS TNI 2HR ng/L  --  2,088*  --    HSTNI D2 ng/L  --  1,571*  --    HS TNI 4HR ng/L 2,187*  --   --    HSTNI D4 ng/L 1,670*  --   --          Results from last 7 days   Lab Units 09/05/24  1149 09/05/24  0352 09/04/24  1943 09/02/24  1944 09/02/24  1302 09/02/24  1006   PROTIME seconds  --   --   --   --  13.9 13.6   INR   --   --   --   --  1.00 0.97   PTT seconds 56* 63* 44*   < > 29 29    < > = values in this interval not displayed.         Results from last 7 days   Lab Units 09/02/24  1006   PROCALCITONIN ng/ml <0.05         Results from last 7 days   Lab Units 09/02/24  1302   BNP pg/mL 242*           Results from last 7 days   Lab Units 09/02/24  1007   INFLUENZA A PCR  Negative   INFLUENZA B PCR  Negative   RSV PCR  Negative       Medications:   Scheduled Medications:  amLODIPine, 5 mg, Oral, Daily  aspirin, 81 mg, Oral, Daily  atorvastatin, 80 mg, Oral, Daily With Dinner  chlorhexidine, 15 mL, Mouth/Throat, Q12H KALYANI  heparin, , Intravenous, Once  insulin lispro, 1-5 Units, Subcutaneous, TID AC  [START ON 9/6/2024] lisinopril, 10 mg, Oral, Daily  metoprolol succinate, 12.5 mg, Oral, Daily      Continuous IV Infusions:  Heparin infusion discontinued 9/5 @ 1400     PRN Meds:  hydrALAZINE, 10 mg, Intravenous, Q6H PRN  nitroglycerin, 0.4 mg, Sublingual, Q5 Min PRN        Discharge Plan: TBD    Network Utilization Review Department  ATTENTION: Please call with any questions or concerns to [phone] and carefully listen to the prompts so that you are directed to the right person. All voicemails are  confidential.   For Discharge needs, contact Care Management DC Support Team at [phone] opt. 2  Send all requests for admission clinical reviews, approved or denied determinations and any other requests to dedicated fax number below belonging to the campus where the patient is receiving treatment. List of dedicated fax numbers for the Facilities:  FACILITY NAME UR FAX NUMBER   ADMISSION DENIALS (Administrative/Medical Necessity) [phone]   DISCHARGE SUPPORT TEAM (NETWORK) [phone]   PARENT CHILD HEALTH (Maternity/NICU/Pediatrics) [phone]   [facility] [phone]   [facility] [phone]   [facility] [phone]   [facility] [phone]   [facility] [phone]   [facility] [phone]   [facility] [phone]   [facility] [phone]   [facility] [phone]   [facility] [phone]   [facility] [phone]   [facility] [phone]

## 2024-09-06 ENCOUNTER — PREP FOR PROCEDURE (OUTPATIENT)
Dept: CARDIAC SURGERY | Facility: CLINIC | Age: 59
End: 2024-09-06

## 2024-09-06 ENCOUNTER — TELEPHONE (OUTPATIENT)
Age: 59
End: 2024-09-06

## 2024-09-06 DIAGNOSIS — I25.10 CAD, MULTIPLE VESSEL: ICD-10-CM

## 2024-09-06 DIAGNOSIS — I21.4 NSTEMI (NON-ST ELEVATED MYOCARDIAL INFARCTION) (HCC): Primary | ICD-10-CM

## 2024-09-06 LAB
ANION GAP SERPL CALCULATED.3IONS-SCNC: 10 MMOL/L (ref 4–13)
APTT PPP: 66 SECONDS (ref 23–34)
BASOPHILS # BLD AUTO: 0.04 THOUSANDS/ΜL (ref 0–0.1)
BASOPHILS NFR BLD AUTO: 1 % (ref 0–1)
BUN SERPL-MCNC: 19 MG/DL (ref 5–25)
CALCIUM SERPL-MCNC: 8.8 MG/DL (ref 8.4–10.2)
CHLORIDE SERPL-SCNC: 111 MMOL/L (ref 96–108)
CO2 SERPL-SCNC: 20 MMOL/L (ref 21–32)
CREAT SERPL-MCNC: 0.91 MG/DL (ref 0.6–1.3)
EOSINOPHIL # BLD AUTO: 0.19 THOUSAND/ΜL (ref 0–0.61)
EOSINOPHIL NFR BLD AUTO: 3 % (ref 0–6)
ERYTHROCYTE [DISTWIDTH] IN BLOOD BY AUTOMATED COUNT: 12.5 % (ref 11.6–15.1)
GFR SERPL CREATININE-BSD FRML MDRD: 92 ML/MIN/1.73SQ M
GLUCOSE SERPL-MCNC: 156 MG/DL (ref 65–140)
GLUCOSE SERPL-MCNC: 186 MG/DL (ref 65–140)
GLUCOSE SERPL-MCNC: 189 MG/DL (ref 65–140)
GLUCOSE SERPL-MCNC: 236 MG/DL (ref 65–140)
GLUCOSE SERPL-MCNC: 262 MG/DL (ref 65–140)
HCT VFR BLD AUTO: 33.6 % (ref 36.5–49.3)
HGB BLD-MCNC: 11.5 G/DL (ref 12–17)
IMM GRANULOCYTES # BLD AUTO: 0.02 THOUSAND/UL (ref 0–0.2)
IMM GRANULOCYTES NFR BLD AUTO: 0 % (ref 0–2)
LYMPHOCYTES # BLD AUTO: 2.44 THOUSANDS/ΜL (ref 0.6–4.47)
LYMPHOCYTES NFR BLD AUTO: 36 % (ref 14–44)
MCH RBC QN AUTO: 30.7 PG (ref 26.8–34.3)
MCHC RBC AUTO-ENTMCNC: 34.2 G/DL (ref 31.4–37.4)
MCV RBC AUTO: 90 FL (ref 82–98)
MONOCYTES # BLD AUTO: 0.62 THOUSAND/ΜL (ref 0.17–1.22)
MONOCYTES NFR BLD AUTO: 9 % (ref 4–12)
NEUTROPHILS # BLD AUTO: 3.47 THOUSANDS/ΜL (ref 1.85–7.62)
NEUTS SEG NFR BLD AUTO: 51 % (ref 43–75)
NRBC BLD AUTO-RTO: 0 /100 WBCS
PLATELET # BLD AUTO: 286 THOUSANDS/UL (ref 149–390)
PMV BLD AUTO: 10.7 FL (ref 8.9–12.7)
POTASSIUM SERPL-SCNC: 4 MMOL/L (ref 3.5–5.3)
RBC # BLD AUTO: 3.74 MILLION/UL (ref 3.88–5.62)
SODIUM SERPL-SCNC: 141 MMOL/L (ref 135–147)
WBC # BLD AUTO: 6.78 THOUSAND/UL (ref 4.31–10.16)

## 2024-09-06 PROCEDURE — 80048 BASIC METABOLIC PNL TOTAL CA: CPT

## 2024-09-06 PROCEDURE — 85730 THROMBOPLASTIN TIME PARTIAL: CPT | Performed by: HOSPITALIST

## 2024-09-06 PROCEDURE — 99448 NTRPROF PH1/NTRNET/EHR 21-30: CPT | Performed by: RADIOLOGY

## 2024-09-06 PROCEDURE — 99232 SBSQ HOSP IP/OBS MODERATE 35: CPT | Performed by: NURSE PRACTITIONER

## 2024-09-06 PROCEDURE — 99233 SBSQ HOSP IP/OBS HIGH 50: CPT | Performed by: INTERNAL MEDICINE

## 2024-09-06 PROCEDURE — 99255 IP/OBS CONSLTJ NEW/EST HI 80: CPT | Performed by: THORACIC SURGERY (CARDIOTHORACIC VASCULAR SURGERY)

## 2024-09-06 PROCEDURE — 93005 ELECTROCARDIOGRAM TRACING: CPT

## 2024-09-06 PROCEDURE — 85025 COMPLETE CBC W/AUTO DIFF WBC: CPT

## 2024-09-06 PROCEDURE — 82948 REAGENT STRIP/BLOOD GLUCOSE: CPT

## 2024-09-06 RX ADMIN — INSULIN LISPRO 3 UNITS: 100 INJECTION, SOLUTION INTRAVENOUS; SUBCUTANEOUS at 11:33

## 2024-09-06 RX ADMIN — INSULIN GLARGINE 6 UNITS: 100 INJECTION, SOLUTION SUBCUTANEOUS at 21:51

## 2024-09-06 RX ADMIN — INSULIN LISPRO 1 UNITS: 100 INJECTION, SOLUTION INTRAVENOUS; SUBCUTANEOUS at 06:34

## 2024-09-06 RX ADMIN — INSULIN LISPRO 2 UNITS: 100 INJECTION, SOLUTION INTRAVENOUS; SUBCUTANEOUS at 11:33

## 2024-09-06 RX ADMIN — HEPARIN SODIUM 18 UNITS/KG/HR: 10000 INJECTION, SOLUTION INTRAVENOUS at 11:35

## 2024-09-06 RX ADMIN — CHLORHEXIDINE GLUCONATE 0.12% ORAL RINSE 15 ML: 1.2 LIQUID ORAL at 08:19

## 2024-09-06 RX ADMIN — CHLORHEXIDINE GLUCONATE 0.12% ORAL RINSE 15 ML: 1.2 LIQUID ORAL at 21:51

## 2024-09-06 RX ADMIN — ASPIRIN 81 MG: 81 TABLET, COATED ORAL at 08:19

## 2024-09-06 RX ADMIN — INSULIN LISPRO 3 UNITS: 100 INJECTION, SOLUTION INTRAVENOUS; SUBCUTANEOUS at 16:03

## 2024-09-06 RX ADMIN — INSULIN LISPRO 1 UNITS: 100 INJECTION, SOLUTION INTRAVENOUS; SUBCUTANEOUS at 16:03

## 2024-09-06 RX ADMIN — INSULIN LISPRO 3 UNITS: 100 INJECTION, SOLUTION INTRAVENOUS; SUBCUTANEOUS at 08:24

## 2024-09-06 RX ADMIN — ATORVASTATIN CALCIUM 80 MG: 80 TABLET, FILM COATED ORAL at 16:02

## 2024-09-06 NOTE — CONSULTS
Consultation - Cardiothoracic Surgery   Aristoteles Toribio Reyes 58 y.o. male MRN: [id number]  Unit/Bed#: [facility]-321-01 Encounter: [id number]    Physician Requesting Consult: Ralph Centeno DO    Reason for Consult / Principal Problem: MVCAD eval for CABG    Inpatient consult to Cardiac Surgery  Consult performed by: Emily Haynes PA-C  Consult ordered by: Ceci Tate DO        History of Present Illness: Aristoteles Toribio Reyes is a 58 y.o. male with a PMH of HTN, HLD, DMII A1C 8.4, h/o Stroke who presented to [facility] ER 9/2 with complaints of substernal chest pain with radiation to left arm. Also with symptoms of headache and nausea. EKG showed no ST elevations and no serial changes. Of note the patient had significant elevated blood pressure and reported not taking his lisinopril.  Also had possible left leg weakness on ER eval.  CTA chest/abd/pelvis negative for dissection. CT head showed no acute stroke but did show old left cerebellar infarct and nonspecific 5 mm calcification versus calcific lesion in the right temporo-occipital junction, and CTA head/neck negative for LVO or significant stenosis. Troponin was significantly elevated. He admitted with ACS, + NSTEMI, as well stroke pathway, cardiology and neuro consulted. He was started on IV heparin and loaded with Brilinta on 9/2. Troponin peak at 2088. Brain MRI negative for acute process. Cardiac cath showed severe MVCAD. Echo showed LVEF 50% with mild septal and apical HK, no valve disease. Arrangements made for transfer to [facility] for cardiac surgery eval for CABG.      Interpretor services were used as the patient mainly speaks Cameroonian. He is here today with his wife. He relays the above story to me. Upon examination today, he reports feeling ok.  Denies CP, palpitations, SOB, GARCIA, LE edema b/l, orthopnea, PND, numbness/tinlging/paresthesias in UE or LE bilaterally, HA, lightheadeness, dizziness, presyncopal symptoms, hx syncopal events, N/V/D,  hemoptysis, hematemesis, hematochezia, melena. Denies hx cancer w/ chest wall radiation, hx blood loss anemia, hx varicose veins or vein stripping.    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 Does not use an assist device for ambulation. Drives.  , with 3 kids. Denies current use of tobacco (former use - states he quit many years ago), no ETOH use. Occasional marijuana use.         Past Medical History:  Past Medical History:   Diagnosis Date    History of cardioembolic cerebrovascular accident (CVA) 09/02/2024    History of cardioembolic cerebrovascular accident (CVA) 09/02/2024     HTN  HLD  DM    Past Surgical History:   No past surgical history on file.  Surgery on Elbow    Family History:  No family history on file.  Mom and Dad had heart disease    Social History:  Social History     Substance and Sexual Activity   Alcohol Use Never     Social History     Substance and Sexual Activity   Drug Use Never     Social History     Tobacco Use   Smoking Status Some Days    Types: Cigarettes   Smokeless Tobacco Not on file     Marital Status: /Civil Union      Home Medications:   Prior to Admission medications    Medication Sig Start Date End Date Taking? Authorizing Provider   lisinopril (ZESTRIL) 10 mg tablet Take 10 mg by mouth daily    Historical Provider, MD   metFORMIN (GLUCOPHAGE) 1000 MG tablet Take 1,000 mg by mouth 2 (two) times a day with meals    Historical Provider, MD       Inpatient Medications:  Scheduled Meds:   Current Facility-Administered Medications   Medication Dose Route Frequency Provider Last Rate    amLODIPine  5 mg Oral Daily SRINIVAS Wilkerson      aspirin  81 mg Oral Daily SRINIVAS Wilkerson      atorvastatin  80 mg Oral Daily With Dinner SRINIVAS Wilkerson      chlorhexidine  15 mL Mouth/Throat Q12H [facility] SRINIVAS Wilkerson      heparin (porcine)  3-30 Units/kg/hr (Order-Specific) Intravenous Titrated SRINIVAS Wilkerson 18  Units/kg/hr (09/05/24 1849)    heparin (porcine)  2,400 Units Intravenous Q6H PRN Emmy Straandrew, DUNP      heparin (porcine)  4,800 Units Intravenous Q6H PRN Emmy Strauschaz, CRNP      insulin glargine  6 Units Subcutaneous HS Ceci Tate DO      insulin lispro  1-5 Units Subcutaneous TID AC EmmySRINIVAS Borrego      insulin lispro  3 Units Subcutaneous TID With Meals Ceci Tate DO      lisinopril  10 mg Oral Daily SRINIVAS Wilkerson      metoprolol succinate  12.5 mg Oral Daily EmmySRINIVAS Borrego      nitroglycerin  0.4 mg Sublingual Q5 Min PRN SRINIVAS Wilkerson       Continuous Infusions: heparin (porcine), 3-30 Units/kg/hr (Order-Specific), Last Rate: 18 Units/kg/hr (09/05/24 1849)      PRN Meds:  heparin (porcine), 2,400 Units, Q6H PRN  heparin (porcine), 4,800 Units, Q6H PRN  nitroglycerin, 0.4 mg, Q5 Min PRN        Allergies:  No Known Allergies    Review of Systems:  Review of Systems   Constitutional:  Positive for fatigue. Negative for chills and fever.   HENT:  Negative for ear pain and sore throat.    Eyes:  Negative for pain and visual disturbance.   Respiratory:  Negative for cough and shortness of breath.    Cardiovascular:  Positive for chest pain. Negative for palpitations and leg swelling.   Gastrointestinal:  Negative for abdominal pain, blood in stool, constipation, diarrhea, nausea and vomiting.   Genitourinary:  Negative for dysuria and hematuria.   Musculoskeletal:  Negative for arthralgias and back pain.   Skin:  Negative for color change and rash.   Neurological:  Negative for seizures and syncope.   All other systems reviewed and are negative.      Vital Signs:     Vitals:    09/05/24 2045 09/05/24 2336 09/06/24 0256 09/06/24 0712   BP:  116/70 106/68 101/57   BP Location:       Pulse:  81 75 72   Resp:  16 16 16   Temp:  97.7 °F (36.5 °C) 98.3 °F (36.8 °C) 97.8 °F (36.6 °C)   SpO2: 99% 98% 99% 98%     Invasive Devices       Peripheral Intravenous  Line  Duration             Peripheral IV 09/06/24 Dorsal (posterior);Right Forearm <1 day                    Physical Exam:  Physical Exam  Vitals reviewed.   Constitutional:       General: He is not in acute distress.     Appearance: Normal appearance. He is normal weight.   HENT:      Head: Normocephalic and atraumatic.      Mouth/Throat:      Mouth: Mucous membranes are moist.      Pharynx: Oropharynx is clear.   Eyes:      Extraocular Movements: Extraocular movements intact.      Conjunctiva/sclera: Conjunctivae normal.      Pupils: Pupils are equal, round, and reactive to light.   Cardiovascular:      Rate and Rhythm: Normal rate and regular rhythm.      Heart sounds: Normal heart sounds. No murmur heard.  Pulmonary:      Effort: Pulmonary effort is normal.      Breath sounds: Normal breath sounds.   Abdominal:      General: Abdomen is flat. Bowel sounds are normal.      Palpations: Abdomen is soft.   Musculoskeletal:         General: No swelling. Normal range of motion.      Cervical back: Normal range of motion and neck supple.   Skin:     General: Skin is warm and dry.   Neurological:      General: No focal deficit present.      Mental Status: He is alert and oriented to person, place, and time.   Psychiatric:         Mood and Affect: Mood normal.         Behavior: Behavior normal.         Lab Results:     Results from last 7 days   Lab Units 09/06/24  0453 09/05/24  0838 09/04/24  0225   WBC Thousand/uL 6.78 7.09 8.84   HEMOGLOBIN g/dL 11.5* 12.2 12.3   HEMATOCRIT % 33.6* 36.6 35.9*   PLATELETS Thousands/uL 286 296 300     Results from last 7 days   Lab Units 09/06/24  0453 09/05/24  0838 09/04/24  0225   POTASSIUM mmol/L 4.0 3.9 3.6   CHLORIDE mmol/L 111* 106 107   CO2 mmol/L 20* 22 23   BUN mg/dL 19 21 22   CREATININE mg/dL 0.91 1.07 1.10   CALCIUM mg/dL 8.8 8.7 9.0     Results from last 7 days   Lab Units 09/06/24  0230 09/05/24  2041 09/05/24  1149 09/02/24  1944 09/02/24  1302 09/02/24  1006   INR   --  "  --   --   --  1.00 0.97   PTT seconds 66* 73* 56*   < > 29 29    < > = values in this interval not displayed.     Lab Results   Component Value Date    HGBA1C 8.4 (H) 09/02/2024     No results found for: \"CKTOTAL\", \"CKMB\", \"CKMBINDEX\", \"TROPONINI\"    Imaging Studies:     Cardiac Catheterization:     Prox LAD to Mid LAD lesion is 80% stenosed.    Prox RCA to Mid RCA lesion is 80% stenosed.    Mid Cx lesion is 40% stenosed.     Multivessel CA; severe multivessel diffuse disease   Mid RCA 80% segmental stenosis   Prox to mid LAD diffuse 80-90% stenosis might have decent landing LIMA target distally  Diagonal 1 has 80% but smaller caliber   Lcx/Om1 has 30% stenosis      LVEDP 12mmHg  Right radial access status post TR band      Echocardiogram:     Left Ventricle: Left ventricular cavity size is normal. Wall thickness is normal. The left ventricular ejection fraction is 50%. Systolic function is low normal.  Mild septal and apical hypokinesis. Diastolic function is mildly abnormal, consistent with grade I (abnormal) relaxation.     Findings    Left Ventricle Left ventricular cavity size is normal. Wall thickness is normal. The left ventricular ejection fraction is 50%. Systolic function is low normal.  Mild septal and apical hypokinesis. Diastolic function is mildly abnormal, consistent with grade I (abnormal) relaxation.   Right Ventricle Right ventricular cavity size is normal. Systolic function is normal. Wall thickness is normal.   Left Atrium The atrium is normal in size.   Right Atrium The atrium is normal in size.   Aortic Valve The aortic valve was not well visualized. The aortic valve is trileaflet. The leaflets are mildly thickened.   Mitral Valve There is no evidence of regurgitation. There is no evidence of stenosis. The mitral valve has normal structure and normal function.   Tricuspid Valve Tricuspid valve structure is normal. There is no evidence of regurgitation. There is no evidence of stenosis. "   Pulmonic Valve Pulmonic valve structure is normal. There is no evidence of regurgitation. There is no evidence of stenosis.   Ascending Aorta The aortic root is normal in size.       Carotid US: <50% B/L Vertebral artery flow is antegrade. There is no significant subclavian artery  disease.    Vein Mapping: Good for harvest B/L      CTA CAP:  FINDINGS: The unenhanced portion of the examination demonstrates some residual contrast from prior CTA stroke study.     AORTA: No aortic dissection, penetrating atherosclerotic ulcer or intramural hematoma. No aortic aneurysm.     Mild atherosclerosis of the aortic arch and left subclavian ostium. The great vessels are otherwise patent without flow-limiting stenosis. Normal celiac axis anatomy with mild atherosclerosis of the ostium. The celiac axis, SMA, RAFAELA and renal arteries   are patent without flow-limiting stenosis. Moderate atherosclerotic calcifications and plaque in the abdominal aorta and iliac bifurcation. Incidentally noted 2 right-sided renal arteries, normal variant.     CHEST     LUNGS: No consolidation or mass. Mild bibasilar atelectasis. There is a 2 mm posterior left lower lobe pulmonary nodule (4:149). Based on current Fleischner Society 2017 Guidelines on incidental pulmonary nodule, no routine follow-up is needed if the   patient is low risk. If the patient is high risk, optional follow-up chest CT at 12 months can be considered.     Mucoid secretion in the lower trachea. Central airways are otherwise patent.     PLEURA: Unremarkable.     HEART/PULMONARY ARTERIAL TREE: Normal heart size. Mild coronary artery calcifications.     MEDIASTINUM AND SUSANA: Mildly prominent right pretracheal lymph node measuring 1.5 x 1.3 cm (3:38). It has normal morphology with preserved fatty susana, suggesting benignity.     CHEST WALL AND LOWER NECK: Unremarkable.     ABDOMEN     LIVER/BILIARY TREE: Unremarkable.     GALLBLADDER: Cholelithiasis without findings of acute  cholecystitis.     SPLEEN: Unremarkable.     PANCREAS: Unremarkable.     ADRENAL GLANDS: Unremarkable.     KIDNEYS/URETERS: Excreted contrast from prior CTA stroke study. No hydronephrosis or urinary tract calculi. Subcentimeter hypoattenuating renal lesion(s), too small to characterize but statistically likely benign, which do not warrant follow-up   (Radiology June 2019).     STOMACH AND BOWEL: Unremarkable.     APPENDIX: Normal.     ABDOMINOPELVIC CAVITY: No ascites. No pneumoperitoneum. No lymphadenopathy.     PELVIS     REPRODUCTIVE ORGANS: Mildly enlarged prostate gland. Symmetric seminal vesicles. Small left hydrocele.     URINARY BLADDER: Unremarkable.     ABDOMINAL WALL/INGUINAL REGIONS: Unremarkable.     BONES: No acute fracture or suspicious osseous lesion. Spinal degenerative changes. Transitional lumbosacral junction. 7 mm sclerotic focus in the T12 vertebral body without aggressive features, likely representing a bone island.     IMPRESSION:     No acute findings in the chest, abdomen, or pelvis. Specifically, no findings of aortic aortic syndrome.       MRI Brain:  IMPRESSION:     1. No acute process seen  2. Susceptibility focus in the right periatrial white matter corresponds to the calcification on head CT. This is nonspecific and may be due to prior granulomatous process or cavernous malformation.    I have personally reviewed pertinent reports.   and I have personally reviewed pertinent films in PACS    Assessment:  Principal Problem:    CAD, multiple vessel  Active Problems:    HTN (hypertension)    NSTEMI (non-ST elevated myocardial infarction) (HCC)    CVA (cerebral vascular accident) (HCC)    Type 2 diabetes mellitus, without long-term current use of insulin (HCC)    Stroke-like symptoms old CVA on CT    Severe coronary artery disease; Ongoing CABG workup    Plan:  Risks and benefits of coronary artery bypass grafting were discussed in detail today with the patient.  They understand and wish  to proceed with further workup and ultimately surgical intervention. Preoperative laboratory and studies reviewed. Last dose of Brilinta was 9/2. They will be scheduled for surgery with Dr. Shaun Hernandez D.O. whitney FL[facility]ERS.    Russ Nuno Reyes was comfortable with our recommendations, and their questions were answered to their satisfaction.  We will continue to evaluate the patient daily with further recommendations as work up is completed.  Thank you for allowing us to participate in the care of this patient.     SIGNATURE: Emily Haynes PA-C  DATE: September 6, 2024  TIME: 8:18 AM    * This note was completed in part utilizing [employer] direct voice recognition software.   Grammatical errors, random word insertion, spelling mistakes, and incomplete sentences may be an occasional consequence of the system secondary to software limitations, ambient noise and hardware issues. At the time of dictation, efforts were made to edit, clarify and /or correct errors. Please read the chart carefully and recognize, using context, where substitutions have occurred.  If you have any questions or concerns about the context, text or information contained within the body of this dictation, please contact myself, the provider, for further clarification.

## 2024-09-06 NOTE — ASSESSMENT & PLAN NOTE
Also with strokelike symptoms on presentation to [facility] with left lower extremity weakness which improved on repeat exam.  Was evaluated by neurology.  CT head showed chronic left cerebellar infarct, no evidence of acute stroke or hemorrhage.  Subsequent MRI brain without any acute findings, Susceptibility focus in the right periatrial white matter corresponds to the calcification on head CT. This is nonspecific and may be due to prior granulomatous process or cavernous malformation.   Continue aspirin, statin.  Neurology signed off.

## 2024-09-06 NOTE — CASE MANAGEMENT
Case Management Discharge Planning Note    Patient name Aristoteles Toribio Reyes  Location [facility]-321/[facility]-321-01 MRN [id number]  : 1965 Date 2024       Current Admission Date: 2024  Current Admission Diagnosis:CAD, multiple vessel   Patient Active Problem List    Diagnosis Date Noted Date Diagnosed    CAD, multiple vessel 2024     Stroke-like symptoms old CVA on CT 2024     HTN (hypertension) 2024     NSTEMI (non-ST elevated myocardial infarction) (HCC) 2024     CVA (cerebral vascular accident) (HCC) 2024     Type 2 diabetes mellitus, without long-term current use of insulin ([facility]) 2024       LOS (days): 1  Geometric Mean LOS (GMLOS) (days):   Days to GMLOS:     OBJECTIVE:  Risk of Unplanned Readmission Score: 10.01         Current admission status: Inpatient   Preferred Pharmacy:   [facility]  [place] [address] [address] [place] [address] [address] [facility] [address]  Phone: [phone] Fax: [phone]    Primary Care Provider: No primary care provider on file.    Primary Insurance: AETNA  Secondary Insurance:     DISCHARGE DETAILS:    Patient transferred from [facility] for CABG evaluation. See CM assessment from .

## 2024-09-06 NOTE — PROGRESS NOTES
General Cardiology   Progress Note -  Team One   Aristoteles Toribio Reyes 58 y.o. male MRN: [id number]    Unit/Bed#: [facility]-321-01 Encounter: [id number]    Assessment & Plan  CAD, multiple vessel  With NSTEMI --> MV disease on cardiac cath. Transferred here for CABG eval.   He remains pain free on IV heparin, ASA, statin and BB.   LVEF 50% on echo, no significant valvular disease.    HTN (hypertension)  Avg /70; well controlled; on BB: metoprolol succinate 12.5mg daily and lisinopril 10mg daily.   NSTEMI (non-ST elevated myocardial infarction) ([facility])  Presented to [facility] with CP; elevated trop; cardiac cath with MV disease.   Type 2 diabetes mellitus, without long-term current use of insulin ([facility])  Lab Results   Component Value Date    HGBA1C 8.4 (H) 09/02/2024     Stroke-like symptoms old CVA on CT  Evaluated by neurology; cleared for AC/AP. Neurology signed off at [facility].       Plan:   Pt transferred from [facility] for CABG eval after found having MV CAD on cardiac cath after presenting with CP/NSTEMI.   He remains pain free on appropriate medical therapy with ASA, statin, BB and IV heparin. Labs and VS stable.   No events on telemetry.  Surgical planning per cardiac surgery team.         Subjective: Pt seen for follow up; no events overnight. He reports feeling well today; no recurrent chest pain. No sob.   Wife at bedside.  Translation phone; pt and wife Monegasque speaking.     Review of Systems   Constitutional: Negative for decreased appetite and fever.   Cardiovascular:  Negative for chest pain, dyspnea on exertion, leg swelling and palpitations.   Respiratory:  Negative for cough and shortness of breath.    Gastrointestinal:  Negative for nausea and vomiting.   Genitourinary:  Negative for dysuria.   Neurological:  Negative for dizziness and light-headedness.   Psychiatric/Behavioral:  Negative for altered mental status.    All other systems reviewed and are negative.    Objective:   Vitals: Blood  pressure 101/57, pulse 72, temperature 97.8 °F (36.6 °C), resp. rate 16, SpO2 98%.,     There is no height or weight on file to calculate BMI.,     Systolic (24hrs), Av , Min:101 , Max:140     Diastolic (24hrs), Av, Min:57, Max:87      Intake/Output Summary (Last 24 hours) at 2024 0974  Last data filed at 2024 0635  Gross per 24 hour   Intake 176.28 ml   Output 375 ml   Net -198.72 ml     Weight (last 2 days)       None          Telemetry Review:   Sinus rhythm, no significant events    Physical Exam  Vitals reviewed.   Constitutional:       General: He is not in acute distress.     Appearance: Normal appearance.      Comments: Pt sitting up in bed in NAD; alert and cooperative, wife at bedside   HENT:      Head: Normocephalic.      Mouth/Throat:      Mouth: Mucous membranes are moist.   Cardiovascular:      Rate and Rhythm: Normal rate and regular rhythm.      Heart sounds: S1 normal and S2 normal. No murmur heard.  Pulmonary:      Effort: Pulmonary effort is normal.      Breath sounds: Normal breath sounds. No wheezing or rales.   Abdominal:      Palpations: Abdomen is soft.   Musculoskeletal:      Right lower leg: No edema.      Left lower leg: No edema.   Skin:     General: Skin is warm.   Neurological:      Mental Status: He is alert and oriented to person, place, and time.   Psychiatric:         Mood and Affect: Mood normal.       LABORATORY RESULTS      CBC with diff:   Results from last 7 days   Lab Units 24  0453 24  0838 24  0225 24  0352 24  1302 24  1006   WBC Thousand/uL 6.78 7.09 8.84 10.03 14.50* 10.17*   HEMOGLOBIN g/dL 11.5* 12.2 12.3 12.3 11.8* 12.5   HEMATOCRIT % 33.6* 36.6 35.9* 35.4* 34.1* 36.4*   MCV fL 90 90 87 87 90 89   PLATELETS Thousands/uL 286 296 300 289 301 311   RBC Million/uL 3.74* 4.05 4.11 4.05 3.81* 4.09   MCH pg 30.7 30.1 29.9 30.4 31.0 30.6   MCHC g/dL 34.2 33.3 34.3 34.7 34.6 34.3   RDW % 12.5 12.3 12.3 12.3 12.4 12.2   MPV fL  "10.7 10.4 10.7 10.7 10.4 10.7   NRBC AUTO /100 WBCs 0  --   --   --   --   --      CMP:  Results from last 7 days   Lab Units 09/06/24 0453 09/05/24 0838 09/04/24 0225 09/03/24  0352 09/02/24  1006   POTASSIUM mmol/L 4.0 3.9 3.6 3.7 3.7   CHLORIDE mmol/L 111* 106 107 107 107   CO2 mmol/L 20* 22 23 22 20*   BUN mg/dL 19 21 22 16 18   CREATININE mg/dL 0.91 1.07 1.10 0.95 0.94   CALCIUM mg/dL 8.8 8.7 9.0 9.2 9.6   AST U/L  --   --   --  20  --    ALT U/L  --   --   --  14  --    ALK PHOS U/L  --   --   --  62  --    EGFR ml/min/1.73sq m 92 76 73 87 89     BMP:  Results from last 7 days   Lab Units 09/06/24 0453 09/05/24 0838 09/04/24 0225 09/03/24  0352 09/02/24  1006   POTASSIUM mmol/L 4.0 3.9 3.6 3.7 3.7   CHLORIDE mmol/L 111* 106 107 107 107   CO2 mmol/L 20* 22 23 22 20*   BUN mg/dL 19 21 22 16 18   CREATININE mg/dL 0.91 1.07 1.10 0.95 0.94   CALCIUM mg/dL 8.8 8.7 9.0 9.2 9.6      Results from last 7 days   Lab Units 09/05/24 0838 09/04/24  1124 09/03/24  0352   MAGNESIUM mg/dL 2.0 1.5* 1.4*     Results from last 7 days   Lab Units 09/02/24  1006   HEMOGLOBIN A1C % 8.4*     Results from last 7 days   Lab Units 09/02/24  1302 09/02/24  1006   INR  1.00 0.97     Lipid Profile:   No results found for: \"CHOL\"  Lab Results   Component Value Date    HDL 49 09/02/2024     Lab Results   Component Value Date    LDLCALC 76 09/02/2024     Lab Results   Component Value Date    TRIG 185 (H) 09/02/2024     Meds/Allergies   all current active meds have been reviewed    Medications Prior to Admission:     lisinopril (ZESTRIL) 10 mg tablet    metFORMIN (GLUCOPHAGE) 1000 MG tablet    heparin (porcine), 3-30 Units/kg/hr (Order-Specific), Last Rate: 18 Units/kg/hr (09/05/24 1849)      Assessment:  Principal Problem:    CAD, multiple vessel  Active Problems:    HTN (hypertension)    NSTEMI (non-ST elevated myocardial infarction) ([facility])    CVA (cerebral vascular accident) ([facility])    Type 2 diabetes mellitus, without long-term current " use of insulin (HCC)    Stroke-like symptoms old CVA on CT    Counseling / Coordination of Care  Total floor / unit time spent today 20 minutes.  Greater than 50% of total time was spent with the patient and / or family counseling and / or coordination of care.      ** Please Note: Dragon 360 Dictation voice to text software may have been used in the creation of this document. **

## 2024-09-06 NOTE — ASSESSMENT & PLAN NOTE
Evaluated by neurology; cleared for AC/AP. Neurology signed off at [facility].       Plan:   Pt transferred from [facility] for CABG eval after found having MV CAD on cardiac cath after presenting with CP/NSTEMI.   He remains pain free on appropriate medical therapy with ASA, statin, BB and IV heparin. Labs and VS stable.   No events on telemetry.  Surgical planning per cardiac surgery team.

## 2024-09-06 NOTE — ASSESSMENT & PLAN NOTE
Lab Results   Component Value Date    HGBA1C 8.4 (H) 09/02/2024       Recent Labs     09/05/24  1054 09/05/24  1855 09/05/24 2037 09/06/24  0625   POCGLU 221* 238* 164* 189*     Holding home metformin  Continue Lantus 6 units nightly and Humalog 3 units 3 times daily with meals plus sliding scale  Diabetic diet  Monitor Accu-Cheks and adjust regimen as needed

## 2024-09-06 NOTE — TELEPHONE ENCOUNTER
09/06/2024- PT IS IN [facility]  10/11/2024- 4-6 WK HFU W/ AP W/ MRI brain     [facility]  Please schedule 4-6 week f/u with AP, MRI brain w/wo prior

## 2024-09-06 NOTE — ASSESSMENT & PLAN NOTE
BP acceptable.  Continue PTA dose of lisinopril, now on beta-blocker and Norvasc as well.  Monitor

## 2024-09-06 NOTE — ASSESSMENT & PLAN NOTE
With NSTEMI --> MV disease on cardiac cath. Transferred here for CABG eval.   He remains pain free on IV heparin, ASA, statin and BB.   LVEF 50% on echo, no significant valvular disease.

## 2024-09-06 NOTE — CONSULTS
e-Consult (IPC)     Inpatient consult to Neurosurgery  Consult performed by: Abdoul Samuels PA-C  Consult ordered by: Emily Haynes PA-C           Aristoteles Toribio Reyes 58 y.o. male MRN: [id number]  Unit/Bed#: PPHP-321-01 Encounter: [id number]    Reason for Consult    Per provider report, patient with PMH significant for HTN, DM2, HLS, CVA presents with chest pain for cardiac work up. Unfortunately he also developed fluctuating weakness at OSH prompting neurology consult and stroke work up. NIHSS 1, improved to 0. Imaging showed a chronic cerebellar stroke as well a small area of calcification vs cavmal R periatrial region. Neurosurgery was asked to review imaging to clear for upcoming CABG with cardiac surgery.     Available past medical history,social history, surgical history, medication list, drug allergies and review of systems were reviewed.    /74   Pulse 79   Temp 98.4 °F (36.9 °C)   Resp 18   SpO2 100%      Clinical exam per provider report, GCS 15 and nonfocal.    Imaging personally reviewed with Dr Jauregui:  CT head w/o, 9/2/24: There is nonspecific 5 mm calcification versus calcific lesion in the right temporo-occipital junction (series 2 image 20). Recommend further characterization with brain MRI without and with contrast if no prior outside imaging is available   CTA head and neck w/wo, 9/2/24: Patent major vessels of the [place] of cason without high-grade stenosis. No aneurysm   MRI brain w/o, 9/2/24: Susceptibility focus in the right periatrial white matter corresponds to the calcification on head CT. This is nonspecific and may be due to prior granulomatous process or cavernous malformation       Assessment and Recommendations    1. Continue to monitor neurological exam  2. Reviewed imaging with Dr Jauregui. While this appears to be calcification, cavernous malformation cannot be ruled out. Recommend MRI brain with contrast at some point in the future for further  clarification. This does not need to be done pre op.  3. No contraindications for cardiac surgery from a neurosurgical standpoint  4. Will schedule outpatient follow up in 4-6 weeks with MRI brain w/wo contrast to be done prior to appointment    All questions answered. Provider is in agreement with the course of action.  21-30 minutes, >50% of the total time devoted to medical consultative verbal/EMR discussion between providers. Written report will be generated in the EMR.

## 2024-09-06 NOTE — ASSESSMENT & PLAN NOTE
Initially presented to [facility] for chest pain, found to have elevated troponins without EKG changes.  Underwent cardiac catheterization on 9/4 which showed multivessel disease.  Transferred to [place] for CT surgery evaluation/CABG.  CT surgery following, surgical date TBD.  Continue beta-blocker, aspirin, statin as well as heparin drip.  LVEF on echo 50%.  No significant valvular disease.  Telemetry

## 2024-09-06 NOTE — PROGRESS NOTES
[facility]  Progress Note  Name: Aristoteles Toribio Reyes I  MRN: [id number]  Unit/Bed#: PPHP-321-01 I Date of Admission: 9/5/2024   Date of Service: 9/6/2024  Hospital Day: 1    Assessment & Plan   * CAD, multiple vessel  Assessment & Plan  Initially presented to [facility] for chest pain, found to have elevated troponins without EKG changes.  Underwent cardiac catheterization on 9/4 which showed multivessel disease.  Transferred to [place] for CT surgery evaluation/CABG.  CT surgery following, surgical date TBD.  Continue beta-blocker, aspirin, statin as well as heparin drip.  LVEF on echo 50%.  No significant valvular disease.  Telemetry    Stroke-like symptoms old CVA on CT  Assessment & Plan  Also with strokelike symptoms on presentation to [facility] with left lower extremity weakness which improved on repeat exam.  Was evaluated by neurology.  CT head showed chronic left cerebellar infarct, no evidence of acute stroke or hemorrhage.  Subsequent MRI brain without any acute findings, Susceptibility focus in the right periatrial white matter corresponds to the calcification on head CT. This is nonspecific and may be due to prior granulomatous process or cavernous malformation.   Continue aspirin, statin.  Neurology signed off.    HTN (hypertension)  Assessment & Plan  BP acceptable.  Continue PTA dose of lisinopril, now on beta-blocker and Norvasc as well.  Monitor    Type 2 diabetes mellitus, without long-term current use of insulin (HCC)  Assessment & Plan  Lab Results   Component Value Date    HGBA1C 8.4 (H) 09/02/2024       Recent Labs     09/05/24  1054 09/05/24  1855 09/05/24  2037 09/06/24  0625   POCGLU 221* 238* 164* 189*     Holding home metformin  Continue Lantus 6 units nightly and Humalog 3 units 3 times daily with meals plus sliding scale  Diabetic diet  Monitor Accu-Cheks and adjust regimen as needed               VTE Pharmacologic  Prophylaxis:   Moderate Risk (Score 3-4) - Pharmacological DVT Prophylaxis Ordered: heparin drip.    Mobility:   Basic Mobility Inpatient Raw Score: 24  JH-HLM Goal: 8: Walk 250 feet or more  JH-HLM Achieved: 7: Walk 25 feet or more  JH-HLM Goal NOT achieved. Continue with multidisciplinary rounding and encourage appropriate mobility to improve upon JH-HLM goals.    Patient Centered Rounds: I evaluated the patient without nursing staff present due to RN off unit, d/w covering RN.     Discussions with Specialists or Other Care Team Provider: nursing, case management     Education and Discussions with Family / Patient: Updated  (wife) at bedside.    Total Time Spent on Date of Encounter in care of patient: 30 mins. This time was spent on one or more of the following: performing physical exam; counseling and coordination of care; obtaining or reviewing history; documenting in the medical record; reviewing/ordering tests, medications or procedures; communicating with other healthcare professionals and discussing with patient's family/caregivers.    Current Length of Stay: 1 day(s)  Current Patient Status: Inpatient   Certification Statement: The patient will continue to require additional inpatient hospital stay due to CABG, date TBD  Discharge Plan: Anticipate discharge in >72 hrs to home.    Code Status: Level 1 - Full Code    Subjective:   No complaints. Denies CP/SOB. Understands plan for surgery and is agreeable.  used via blue phone for ROS.     Objective:     Vitals:   Temp (24hrs), Av.1 °F (36.7 °C), Min:97.7 °F (36.5 °C), Max:98.8 °F (37.1 °C)    Temp:  [97.7 °F (36.5 °C)-98.8 °F (37.1 °C)] 97.8 °F (36.6 °C)  HR:  [72-86] 72  Resp:  [16-18] 16  BP: (101-140)/(57-87) 101/57  SpO2:  [98 %-100 %] 98 %  There is no height or weight on file to calculate BMI.     Input and Output Summary (last 24 hours):     Intake/Output Summary (Last 24 hours) at 2024 1002  Last data filed at  9/6/2024 0635  Gross per 24 hour   Intake 176.28 ml   Output 375 ml   Net -198.72 ml       Physical Exam:   Physical Exam  Vitals and nursing note reviewed.   Constitutional:       General: He is not in acute distress.  Cardiovascular:      Rate and Rhythm: Normal rate.   Pulmonary:      Breath sounds: Normal breath sounds.   Abdominal:      Tenderness: There is no abdominal tenderness.   Musculoskeletal:         General: No swelling.   Skin:     General: Skin is warm.   Neurological:      Mental Status: He is alert and oriented to person, place, and time. Mental status is at baseline.   Psychiatric:         Mood and Affect: Mood normal.          Additional Data:     Labs:  Results from last 7 days   Lab Units 09/06/24  0453   WBC Thousand/uL 6.78   HEMOGLOBIN g/dL 11.5*   HEMATOCRIT % 33.6*   PLATELETS Thousands/uL 286   SEGS PCT % 51   LYMPHO PCT % 36   MONO PCT % 9   EOS PCT % 3     Results from last 7 days   Lab Units 09/06/24  0453 09/04/24  0225 09/03/24  0352   SODIUM mmol/L 141   < > 139   POTASSIUM mmol/L 4.0   < > 3.7   CHLORIDE mmol/L 111*   < > 107   CO2 mmol/L 20*   < > 22   BUN mg/dL 19   < > 16   CREATININE mg/dL 0.91   < > 0.95   ANION GAP mmol/L 10   < > 10   CALCIUM mg/dL 8.8   < > 9.2   ALBUMIN g/dL  --   --  4.1   TOTAL BILIRUBIN mg/dL  --   --  0.58   ALK PHOS U/L  --   --  62   ALT U/L  --   --  14   AST U/L  --   --  20   GLUCOSE RANDOM mg/dL 186*   < > 216*    < > = values in this interval not displayed.     Results from last 7 days   Lab Units 09/02/24  1302   INR  1.00     Results from last 7 days   Lab Units 09/06/24  0625 09/05/24  2037 09/05/24  1855 09/05/24  1054 09/05/24  0625 09/04/24  2123 09/04/24  1725 09/04/24  1625 09/04/24  1110 09/04/24  0707 09/03/24  2039 09/03/24  1514   POC GLUCOSE mg/dl 189* 164* 238* 221* 194* 250* 189* 236* 203* 190* 226* 169*     Results from last 7 days   Lab Units 09/02/24  1006   HEMOGLOBIN A1C % 8.4*     Results from last 7 days   Lab Units  09/02/24  1006   PROCALCITONIN ng/ml <0.05       Lines/Drains:  Invasive Devices       Peripheral Intravenous Line  Duration             Peripheral IV 09/06/24 Dorsal (posterior);Right Forearm <1 day                      Telemetry:  Telemetry Orders (From admission, onward)               24 Hour Telemetry Monitoring  Continuous x 24 Hours (Telem)        Question:  Reason for 24 Hour Telemetry  Answer:  PCI/EP study (including pacer and ICD implementation), Cardiac surgery, MI, abnormal cardiac cath, and chest pain- rule out MI                     Telemetry Reviewed: Normal Sinus Rhythm  Indication for Continued Telemetry Use: Awaiting PCI/EP Study/CABG             Imaging: No pertinent imaging reviewed.    Recent Cultures (last 7 days):         Last 24 Hours Medication List:   Current Facility-Administered Medications   Medication Dose Route Frequency Provider Last Rate    amLODIPine  5 mg Oral Daily SRINIVAS Wilkerson      aspirin  81 mg Oral Daily SRINIVAS Wilkerson      atorvastatin  80 mg Oral Daily With Dinner SRINIVAS Wilkerson      chlorhexidine  15 mL Mouth/Throat Q12H KALYANI SRINIVAS Wilkerson      heparin (porcine)  3-30 Units/kg/hr (Order-Specific) Intravenous Titrated SRINIVAS Wilkerson 18 Units/kg/hr (09/05/24 1849)    heparin (porcine)  2,400 Units Intravenous Q6H PRN SRINIVAS Wilkerson      heparin (porcine)  4,800 Units Intravenous Q6H PRN SRINIVAS Wilkerson      insulin glargine  6 Units Subcutaneous HS Ceci Tate DO      insulin lispro  1-5 Units Subcutaneous TID AC SRINIVAS Wilkerson      insulin lispro  3 Units Subcutaneous TID With Meals Ceci Tate DO      lisinopril  10 mg Oral Daily SRINIVAS Wilkerson      metoprolol succinate  12.5 mg Oral Daily SRINIVAS Wilkerson      nitroglycerin  0.4 mg Sublingual Q5 Min PRN SRINIVAS Wilkerson          Today, Patient Was Seen By: SRINIVAS Green    **Please Note: This  note may have been constructed using a voice recognition system.**

## 2024-09-06 NOTE — H&P (VIEW-ONLY)
Consultation - Cardiothoracic Surgery   Aristoteles Toribio Reyes 58 y.o. male MRN: [id number]  Unit/Bed#: PPHP-321-01 Encounter: [id number]    Physician Requesting Consult: Ralph Centeno DO    Reason for Consult / Principal Problem: MVCAD eval for CABG    Inpatient consult to Cardiac Surgery  Consult performed by: Emily Haynes PA-C  Consult ordered by: Ceci Tate DO        History of Present Illness: Aristoteles Toribio Reyes is a 58 y.o. male with a PMH of HTN, HLD, DMII A1C 8.4, h/o Stroke who presented to [facility] ER 9/2 with complaints of substernal chest pain with radiation to left arm. Also with symptoms of headache and nausea. EKG showed no ST elevations and no serial changes. Of note the patient had significant elevated blood pressure and reported not taking his lisinopril.  Also had possible left leg weakness on ER eval.  CTA chest/abd/pelvis negative for dissection. CT head showed no acute stroke but did show old left cerebellar infarct and nonspecific 5 mm calcification versus calcific lesion in the right temporo-occipital junction, and CTA head/neck negative for LVO or significant stenosis. Troponin was significantly elevated. He admitted with ACS, + NSTEMI, as well stroke pathway, cardiology and neuro consulted. He was started on IV heparin and loaded with Brilinta on 9/2. Troponin peak at 2088. Brain MRI negative for acute process. Cardiac cath showed severe MVCAD. Echo showed LVEF 50% with mild septal and apical HK, no valve disease. Arrangements made for transfer to [facility] for cardiac surgery eval for CABG.      Interpretor services were used as the patient mainly speaks Cameroonian. He is here today with his wife. He relays the above story to me. Upon examination today, he reports feeling ok.  Denies CP, palpitations, SOB, GARCIA, LE edema b/l, orthopnea, PND, numbness/tinlging/paresthesias in UE or LE bilaterally, HA, lightheadeness, dizziness, presyncopal symptoms, hx syncopal events, N/V/D,  hemoptysis, hematemesis, hematochezia, melena. Denies hx cancer w/ chest wall radiation, hx blood loss anemia, hx varicose veins or vein stripping.    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 Does not use an assist device for ambulation. Drives.  , with 3 kids. Denies current use of tobacco (former use - states he quit many years ago), no ETOH use. Occasional marijuana use.         Past Medical History:  Past Medical History:   Diagnosis Date    History of cardioembolic cerebrovascular accident (CVA) 09/02/2024    History of cardioembolic cerebrovascular accident (CVA) 09/02/2024     HTN  HLD  DM    Past Surgical History:   No past surgical history on file.  Surgery on Elbow    Family History:  No family history on file.  Mom and Dad had heart disease    Social History:  Social History     Substance and Sexual Activity   Alcohol Use Never     Social History     Substance and Sexual Activity   Drug Use Never     Social History     Tobacco Use   Smoking Status Some Days    Types: Cigarettes   Smokeless Tobacco Not on file     Marital Status: /Civil Union      Home Medications:   Prior to Admission medications    Medication Sig Start Date End Date Taking? Authorizing Provider   lisinopril (ZESTRIL) 10 mg tablet Take 10 mg by mouth daily    Historical Provider, MD   metFORMIN (GLUCOPHAGE) 1000 MG tablet Take 1,000 mg by mouth 2 (two) times a day with meals    Historical Provider, MD       Inpatient Medications:  Scheduled Meds:   Current Facility-Administered Medications   Medication Dose Route Frequency Provider Last Rate    amLODIPine  5 mg Oral Daily SRINIVAS Wilkerson      aspirin  81 mg Oral Daily SRINIVAS Wilkerson      atorvastatin  80 mg Oral Daily With Dinner SRINIVAS Wilkerson      chlorhexidine  15 mL Mouth/Throat Q12H [facility] SRINIVAS Wilkerson      heparin (porcine)  3-30 Units/kg/hr (Order-Specific) Intravenous Titrated SRINIVAS Wilkerson 18  Units/kg/hr (09/05/24 1849)    heparin (porcine)  2,400 Units Intravenous Q6H PRN Emmy Straandrew, DUNP      heparin (porcine)  4,800 Units Intravenous Q6H PRN Emmy Strauschaz, CRNP      insulin glargine  6 Units Subcutaneous HS Ceci Tate DO      insulin lispro  1-5 Units Subcutaneous TID AC EmmySRINIVAS Borrego      insulin lispro  3 Units Subcutaneous TID With Meals Ceci Tate DO      lisinopril  10 mg Oral Daily SRINIVAS Wilkerson      metoprolol succinate  12.5 mg Oral Daily EmmySRINIVAS Borrego      nitroglycerin  0.4 mg Sublingual Q5 Min PRN SRINIVAS Wilkerson       Continuous Infusions: heparin (porcine), 3-30 Units/kg/hr (Order-Specific), Last Rate: 18 Units/kg/hr (09/05/24 1849)      PRN Meds:  heparin (porcine), 2,400 Units, Q6H PRN  heparin (porcine), 4,800 Units, Q6H PRN  nitroglycerin, 0.4 mg, Q5 Min PRN        Allergies:  No Known Allergies    Review of Systems:  Review of Systems   Constitutional:  Positive for fatigue. Negative for chills and fever.   HENT:  Negative for ear pain and sore throat.    Eyes:  Negative for pain and visual disturbance.   Respiratory:  Negative for cough and shortness of breath.    Cardiovascular:  Positive for chest pain. Negative for palpitations and leg swelling.   Gastrointestinal:  Negative for abdominal pain, blood in stool, constipation, diarrhea, nausea and vomiting.   Genitourinary:  Negative for dysuria and hematuria.   Musculoskeletal:  Negative for arthralgias and back pain.   Skin:  Negative for color change and rash.   Neurological:  Negative for seizures and syncope.   All other systems reviewed and are negative.      Vital Signs:     Vitals:    09/05/24 2045 09/05/24 2336 09/06/24 0256 09/06/24 0712   BP:  116/70 106/68 101/57   BP Location:       Pulse:  81 75 72   Resp:  16 16 16   Temp:  97.7 °F (36.5 °C) 98.3 °F (36.8 °C) 97.8 °F (36.6 °C)   SpO2: 99% 98% 99% 98%     Invasive Devices       Peripheral Intravenous  Line  Duration             Peripheral IV 09/06/24 Dorsal (posterior);Right Forearm <1 day                    Physical Exam:  Physical Exam  Vitals reviewed.   Constitutional:       General: He is not in acute distress.     Appearance: Normal appearance. He is normal weight.   HENT:      Head: Normocephalic and atraumatic.      Mouth/Throat:      Mouth: Mucous membranes are moist.      Pharynx: Oropharynx is clear.   Eyes:      Extraocular Movements: Extraocular movements intact.      Conjunctiva/sclera: Conjunctivae normal.      Pupils: Pupils are equal, round, and reactive to light.   Cardiovascular:      Rate and Rhythm: Normal rate and regular rhythm.      Heart sounds: Normal heart sounds. No murmur heard.  Pulmonary:      Effort: Pulmonary effort is normal.      Breath sounds: Normal breath sounds.   Abdominal:      General: Abdomen is flat. Bowel sounds are normal.      Palpations: Abdomen is soft.   Musculoskeletal:         General: No swelling. Normal range of motion.      Cervical back: Normal range of motion and neck supple.   Skin:     General: Skin is warm and dry.   Neurological:      General: No focal deficit present.      Mental Status: He is alert and oriented to person, place, and time.   Psychiatric:         Mood and Affect: Mood normal.         Behavior: Behavior normal.         Lab Results:     Results from last 7 days   Lab Units 09/06/24  0453 09/05/24  0838 09/04/24  0225   WBC Thousand/uL 6.78 7.09 8.84   HEMOGLOBIN g/dL 11.5* 12.2 12.3   HEMATOCRIT % 33.6* 36.6 35.9*   PLATELETS Thousands/uL 286 296 300     Results from last 7 days   Lab Units 09/06/24  0453 09/05/24  0838 09/04/24  0225   POTASSIUM mmol/L 4.0 3.9 3.6   CHLORIDE mmol/L 111* 106 107   CO2 mmol/L 20* 22 23   BUN mg/dL 19 21 22   CREATININE mg/dL 0.91 1.07 1.10   CALCIUM mg/dL 8.8 8.7 9.0     Results from last 7 days   Lab Units 09/06/24  0230 09/05/24  2041 09/05/24  1149 09/02/24  1944 09/02/24  1302 09/02/24  1006   INR   --  "  --   --   --  1.00 0.97   PTT seconds 66* 73* 56*   < > 29 29    < > = values in this interval not displayed.     Lab Results   Component Value Date    HGBA1C 8.4 (H) 09/02/2024     No results found for: \"CKTOTAL\", \"CKMB\", \"CKMBINDEX\", \"TROPONINI\"    Imaging Studies:     Cardiac Catheterization:     Prox LAD to Mid LAD lesion is 80% stenosed.    Prox RCA to Mid RCA lesion is 80% stenosed.    Mid Cx lesion is 40% stenosed.     Multivessel CA; severe multivessel diffuse disease   Mid RCA 80% segmental stenosis   Prox to mid LAD diffuse 80-90% stenosis might have decent landing LIMA target distally  Diagonal 1 has 80% but smaller caliber   Lcx/Om1 has 30% stenosis      LVEDP 12mmHg  Right radial access status post TR band      Echocardiogram:     Left Ventricle: Left ventricular cavity size is normal. Wall thickness is normal. The left ventricular ejection fraction is 50%. Systolic function is low normal.  Mild septal and apical hypokinesis. Diastolic function is mildly abnormal, consistent with grade I (abnormal) relaxation.     Findings    Left Ventricle Left ventricular cavity size is normal. Wall thickness is normal. The left ventricular ejection fraction is 50%. Systolic function is low normal.  Mild septal and apical hypokinesis. Diastolic function is mildly abnormal, consistent with grade I (abnormal) relaxation.   Right Ventricle Right ventricular cavity size is normal. Systolic function is normal. Wall thickness is normal.   Left Atrium The atrium is normal in size.   Right Atrium The atrium is normal in size.   Aortic Valve The aortic valve was not well visualized. The aortic valve is trileaflet. The leaflets are mildly thickened.   Mitral Valve There is no evidence of regurgitation. There is no evidence of stenosis. The mitral valve has normal structure and normal function.   Tricuspid Valve Tricuspid valve structure is normal. There is no evidence of regurgitation. There is no evidence of stenosis. "   Pulmonic Valve Pulmonic valve structure is normal. There is no evidence of regurgitation. There is no evidence of stenosis.   Ascending Aorta The aortic root is normal in size.       Carotid US: <50% B/L Vertebral artery flow is antegrade. There is no significant subclavian artery  disease.    Vein Mapping: Good for harvest B/L      CTA CAP:  FINDINGS: The unenhanced portion of the examination demonstrates some residual contrast from prior CTA stroke study.     AORTA: No aortic dissection, penetrating atherosclerotic ulcer or intramural hematoma. No aortic aneurysm.     Mild atherosclerosis of the aortic arch and left subclavian ostium. The great vessels are otherwise patent without flow-limiting stenosis. Normal celiac axis anatomy with mild atherosclerosis of the ostium. The celiac axis, SMA, RAFAELA and renal arteries   are patent without flow-limiting stenosis. Moderate atherosclerotic calcifications and plaque in the abdominal aorta and iliac bifurcation. Incidentally noted 2 right-sided renal arteries, normal variant.     CHEST     LUNGS: No consolidation or mass. Mild bibasilar atelectasis. There is a 2 mm posterior left lower lobe pulmonary nodule (4:149). Based on current Fleischner Society 2017 Guidelines on incidental pulmonary nodule, no routine follow-up is needed if the   patient is low risk. If the patient is high risk, optional follow-up chest CT at 12 months can be considered.     Mucoid secretion in the lower trachea. Central airways are otherwise patent.     PLEURA: Unremarkable.     HEART/PULMONARY ARTERIAL TREE: Normal heart size. Mild coronary artery calcifications.     MEDIASTINUM AND SUSANA: Mildly prominent right pretracheal lymph node measuring 1.5 x 1.3 cm (3:38). It has normal morphology with preserved fatty susana, suggesting benignity.     CHEST WALL AND LOWER NECK: Unremarkable.     ABDOMEN     LIVER/BILIARY TREE: Unremarkable.     GALLBLADDER: Cholelithiasis without findings of acute  cholecystitis.     SPLEEN: Unremarkable.     PANCREAS: Unremarkable.     ADRENAL GLANDS: Unremarkable.     KIDNEYS/URETERS: Excreted contrast from prior CTA stroke study. No hydronephrosis or urinary tract calculi. Subcentimeter hypoattenuating renal lesion(s), too small to characterize but statistically likely benign, which do not warrant follow-up   (Radiology June 2019).     STOMACH AND BOWEL: Unremarkable.     APPENDIX: Normal.     ABDOMINOPELVIC CAVITY: No ascites. No pneumoperitoneum. No lymphadenopathy.     PELVIS     REPRODUCTIVE ORGANS: Mildly enlarged prostate gland. Symmetric seminal vesicles. Small left hydrocele.     URINARY BLADDER: Unremarkable.     ABDOMINAL WALL/INGUINAL REGIONS: Unremarkable.     BONES: No acute fracture or suspicious osseous lesion. Spinal degenerative changes. Transitional lumbosacral junction. 7 mm sclerotic focus in the T12 vertebral body without aggressive features, likely representing a bone island.     IMPRESSION:     No acute findings in the chest, abdomen, or pelvis. Specifically, no findings of aortic aortic syndrome.       MRI Brain:  IMPRESSION:     1. No acute process seen  2. Susceptibility focus in the right periatrial white matter corresponds to the calcification on head CT. This is nonspecific and may be due to prior granulomatous process or cavernous malformation.    I have personally reviewed pertinent reports.   and I have personally reviewed pertinent films in PACS    Assessment:  Principal Problem:    CAD, multiple vessel  Active Problems:    HTN (hypertension)    NSTEMI (non-ST elevated myocardial infarction) (HCC)    CVA (cerebral vascular accident) (HCC)    Type 2 diabetes mellitus, without long-term current use of insulin (HCC)    Stroke-like symptoms old CVA on CT    Severe coronary artery disease; Ongoing CABG workup    Plan:  Risks and benefits of coronary artery bypass grafting were discussed in detail today with the patient.  They understand and wish  to proceed with further workup and ultimately surgical intervention. Preoperative laboratory and studies reviewed. Last dose of Brilinta was 9/2. They will be scheduled for surgery with Dr. Shaun Hernandez D.O. whitney FL[facility]ERS.    Russ Nuno Reyes was comfortable with our recommendations, and their questions were answered to their satisfaction.  We will continue to evaluate the patient daily with further recommendations as work up is completed.  Thank you for allowing us to participate in the care of this patient.     SIGNATURE: Emily Haynes PA-C  DATE: September 6, 2024  TIME: 8:18 AM    * This note was completed in part utilizing L2C direct voice recognition software.   Grammatical errors, random word insertion, spelling mistakes, and incomplete sentences may be an occasional consequence of the system secondary to software limitations, ambient noise and hardware issues. At the time of dictation, efforts were made to edit, clarify and /or correct errors. Please read the chart carefully and recognize, using context, where substitutions have occurred.  If you have any questions or concerns about the context, text or information contained within the body of this dictation, please contact myself, the provider, for further clarification.

## 2024-09-06 NOTE — UTILIZATION REVIEW
NOTIFICATION OF ADMISSION DISCHARGE   This is a Notification of Discharge from [facility]. Please be advised that this patient has been discharge from our facility. Below you will find the admission and discharge date and time including the patient’s disposition.   UTILIZATION REVIEW CONTACT:  Mariaelena Mccord  Utilization   Network Utilization Review Department  Phone: [phone] x carefully listen to the prompts. All voicemails are confidential.  Email: [email or web address]     ADMISSION INFORMATION  PRESENTATION DATE: 9/2/2024  9:59 AM  OBERVATION ADMISSION DATE: N/A  INPATIENT ADMISSION DATE: 9/2/24 12:45 PM   DISCHARGE DATE: 9/5/2024  4:30 PM   DISPOSITION:[facility] Utilization Review Department  ATTENTION: Please call with any questions or concerns to [phone] and carefully listen to the prompts so that you are directed to the right person. All voicemails are confidential.   For Discharge needs, contact Care Management DC Support Team at [phone] opt. 2  Send all requests for admission clinical reviews, approved or denied determinations and any other requests to dedicated fax number below belonging to the campus where the patient is receiving treatment. List of dedicated fax numbers for the Facilities:  FACILITY NAME UR FAX NUMBER   ADMISSION DENIALS (Administrative/Medical Necessity) [phone]   DISCHARGE SUPPORT TEAM ([facility]) [phone]   PARENT CHILD HEALTH (Maternity/NICU/Pediatrics) [phone]   [facility] [phone]   [facility] [phone]   [facility] [phone]   [facility] [phone]   [facility] [phone]   [facility] [phone]   [facility] [phone]   [facility]  [place] [phone]   [facility] [phone]   [facility] [phone]   [facility] [phone]   [facility] [phone]

## 2024-09-06 NOTE — PLAN OF CARE
Problem: INFECTION - ADULT  Goal: Absence or prevention of progression during hospitalization  Description: INTERVENTIONS:  - Assess and monitor for signs and symptoms of infection  - Monitor lab/diagnostic results  - Monitor all insertion sites, i.e. indwelling lines, tubes, and drains  - Monitor endotracheal if appropriate and nasal secretions for changes in amount and color  - Vincent appropriate cooling/warming therapies per order  - Administer medications as ordered  - Instruct and encourage patient and family to use good hand hygiene technique  - Identify and instruct in appropriate isolation precautions for identified infection/condition  Outcome: Progressing

## 2024-09-06 NOTE — UTILIZATION REVIEW
Initial Clinical Review    Admission: Date/Time/Statement:   Admission Orders (From admission, onward)       Ordered        09/05/24 1838  INPATIENT ADMISSION  Once                          Orders Placed This Encounter   Procedures    INPATIENT ADMISSION     Standing Status:   Standing     Number of Occurrences:   1     Order Specific Question:   Level of Care     Answer:   Med Surg [16]     Order Specific Question:   Estimated length of stay     Answer:   More than 2 Midnights     Order Specific Question:   Certification     Answer:   I certify that inpatient services are medically necessary for this patient for a duration of greater than two midnights. See H&P and MD Progress Notes for additional information about the patient's course of treatment.     Initial Presentation: 58 y.o. male with PMHx of HTN, T2DM, HLD, CVA transferred to [facility] from [facility] where he initially presented on 9/2 d/t c/o CP, as well as fluctuating weakness in the LLE. Neuro w/u was unremarkable and neurology had signed off.  Troponins were elevated as high as 2187.  He was was loaded on aspirin, and started on high intensity statin.  Echo showed EF of 50%.  Cardiac cath done on 9/4 revealed multi vessel disease. He was transferred to [facility] for CTSx eval and further tx.  Admit Inpatient to [facility] Unit Multivessel CAD  Plan: telemetry. Continue IV Hep, po meds, prn hydralazine IV prn for SBP >160. Continue insulin, Lantus and Lispro. Accu-checks Hep sq, SCDs. Consult Cardiology, CTSx.    Date: 9/6   Day 2: No complaints. Denies CP/SOB. Understands plan for surgery and is agreeable. Cardiac diet. SCDs.     CTSx consult -- A: Severe CAD - Plan: proceed with CABG. Ongoing CABG workup. Awaiting PCI/EP Study/CABG     Cardiology consult --  NSTEMI --> MV disease on cardiac cath. Transferred for CABG eval. He remains pain free on IV heparin, ASA, statin and BB. LVEF 50% on echo, no significant valvular disease. Continue BB, lisinopril.         Vital  Signs:      Date/Time Temp Pulse Resp BP MAP (mmHg) SpO2 O2 Device [place] Coma Scale Score Pain    09/06/24 07:12:17 97.8 °F (36.6 °C) 72 16 101/57 72 98 % -- -- --    09/06/24 02:56:56 98.3 °F (36.8 °C) 75 16 106/68 81 99 % -- -- --    09/05/24 23:36:08 97.7 °F (36.5 °C) 81 16 116/70 85 98 % -- -- --    09/05/24 2045 -- -- -- -- -- 99 % None (Room air) 15 No Pain    09/05/24 1857 98.8 °F (37.1 °C) 83 -- 138/87 104 100 % -- -- --    09/05/24 18:55:50 -- 83 -- -- -- 100 % -- -- --    09/05/24 1815 -- -- -- -- -- -- -- 15 --    09/05/24 1802 -- -- -- 140/85 82 98 % None (Room air) -- No Pain         Pertinent Labs/Diagnostic Test Results:   Cardiac Catheterization 9/4:     Prox LAD to Mid LAD lesion is 80% stenosed.    Prox RCA to Mid RCA lesion is 80% stenosed.    Mid Cx lesion is 40% stenosed.     Multivessel CA; severe multivessel diffuse disease   Mid RCA 80% segmental stenosis   Prox to mid LAD diffuse 80-90% stenosis might have decent landing LIMA target distally  Diagonal 1 has 80% but smaller caliber   Lcx/Om1 has 30% stenosis      LVEDP 12mmHg  Right radial access status post TR band        Echocardiogram 9/4:     Left Ventricle: Left ventricular cavity size is normal. Wall thickness is normal. The left ventricular ejection fraction is 50%. Systolic function is low normal.  Mild septal and apical hypokinesis. Diastolic function is mildly abnormal, consistent with grade I (abnormal) relaxation.     Findings        Left Ventricle Left ventricular cavity size is normal. Wall thickness is normal. The left ventricular ejection fraction is 50%. Systolic function is low normal.  Mild septal and apical hypokinesis. Diastolic function is mildly abnormal, consistent with grade I (abnormal) relaxation.   Right Ventricle Right ventricular cavity size is normal. Systolic function is normal. Wall thickness is normal.   Left Atrium The atrium is normal in size.   Right Atrium The atrium is normal in size.   Aortic Valve  The aortic valve was not well visualized. The aortic valve is trileaflet. The leaflets are mildly thickened.   Mitral Valve There is no evidence of regurgitation. There is no evidence of stenosis. The mitral valve has normal structure and normal function.   Tricuspid Valve Tricuspid valve structure is normal. There is no evidence of regurgitation. There is no evidence of stenosis.   Pulmonic Valve Pulmonic valve structure is normal. There is no evidence of regurgitation. There is no evidence of stenosis.   Ascending Aorta The aortic root is normal in size.         Carotid US 9/4: <50% B/L Vertebral artery flow is antegrade. There is no significant subclavian artery  disease.     Vein Mapping: Good for harvest B/L        Results from last 7 days   Lab Units 09/06/24  0453 09/05/24  0838 09/04/24  0225 09/03/24  0352 09/02/24  1302   WBC Thousand/uL 6.78 7.09 8.84 10.03 14.50*   HEMOGLOBIN g/dL 11.5* 12.2 12.3 12.3 11.8*   HEMATOCRIT % 33.6* 36.6 35.9* 35.4* 34.1*   PLATELETS Thousands/uL 286 296 300 289 301   TOTAL NEUT ABS Thousands/µL 3.47  --   --   --   --      Results from last 7 days   Lab Units 09/06/24  0453 09/05/24  0838 09/04/24  1124 09/04/24 0225 09/03/24  0352 09/02/24  1006   SODIUM mmol/L 141 137  --  139 139 139   POTASSIUM mmol/L 4.0 3.9  --  3.6 3.7 3.7   CHLORIDE mmol/L 111* 106  --  107 107 107   CO2 mmol/L 20* 22  --  23 22 20*   ANION GAP mmol/L 10 9  --  9 10 12   BUN mg/dL 19 21  --  22 16 18   CREATININE mg/dL 0.91 1.07  --  1.10 0.95 0.94   EGFR ml/min/1.73sq m 92 76  --  73 87 89   CALCIUM mg/dL 8.8 8.7  --  9.0 9.2 9.6   MAGNESIUM mg/dL  --  2.0 1.5*  --  1.4*  --    PHOSPHORUS mg/dL  --   --   --   --  2.9  --      Results from last 7 days   Lab Units 09/06/24  0625 09/05/24  2037 09/05/24  1855 09/05/24  1054 09/05/24  0625 09/04/24  2123 09/04/24  1725 09/04/24  1625 09/04/24  1110 09/04/24  0707 09/03/24  2039 09/03/24  1514   POC GLUCOSE mg/dl 189* 164* 238* 221* 194* 250* 189* 236*  203* 190* 226* 169*     Results from last 7 days   Lab Units 09/06/24  0453 09/05/24  0838 09/04/24  0225 09/03/24  0352 09/02/24  1006   GLUCOSE RANDOM mg/dL 186* 332* 197* 216* 210*     Results from last 7 days   Lab Units 09/02/24  1458 09/02/24  1302 09/02/24  1006   HS TNI 0HR ng/L  --   --  517*   HS TNI 2HR ng/L  --  2,088*  --    HSTNI D2 ng/L  --  1,571*  --    HS TNI 4HR ng/L 2,187*  --   --    HSTNI D4 ng/L 1,670*  --   --        Results from last 7 days   Lab Units 09/06/24  0230 09/05/24  2041 09/05/24  1149 09/02/24  1944 09/02/24  1302 09/02/24  1006   PROTIME seconds  --   --   --   --  13.9 13.6   INR   --   --   --   --  1.00 0.97   PTT seconds 66* 73* 56*   < > 29 29    < > = values in this interval not displayed.     Results from last 7 days   Lab Units 09/02/24  1302   BNP pg/mL 242*       Past Medical History:   Diagnosis Date    History of cardioembolic cerebrovascular accident (CVA) 09/02/2024    History of cardioembolic cerebrovascular accident (CVA) 09/02/2024     Present on Admission:   CAD, multiple vessel   Type 2 diabetes mellitus, without long-term current use of insulin (HCC)   HTN (hypertension)   Stroke-like symptoms old CVA on CT      Admitting Diagnosis: Triple vessel disease of the heart [I25.10]  Age/Sex: 58 y.o. male  Admission Orders:  Scheduled Medications:  amLODIPine, 5 mg, Oral, Daily  aspirin, 81 mg, Oral, Daily  atorvastatin, 80 mg, Oral, Daily With Dinner  chlorhexidine, 15 mL, Mouth/Throat, Q12H KALYANI  insulin glargine, 6 Units, Subcutaneous, HS  insulin lispro, 1-5 Units, Subcutaneous, TID AC  insulin lispro, 3 Units, Subcutaneous, TID With Meals  lisinopril, 10 mg, Oral, Daily  metoprolol succinate, 12.5 mg, Oral, Daily    Continuous IV Infusions:  heparin (porcine), 3-30 Units/kg/hr (Order-Specific), Intravenous, Titrated    PRN Meds:  heparin (porcine), 2,400 Units, Intravenous, Q6H PRN  heparin (porcine), 4,800 Units, Intravenous, Q6H PRN  nitroglycerin, 0.4 mg,  Sublingual, Q5 Min PRN        IP CONSULT TO CARDIOLOGY  IP CONSULT TO CARDIOTHORACIC SURGERY    Network Utilization Review Department  ATTENTION: Please call with any questions or concerns to [phone] and carefully listen to the prompts so that you are directed to the right person. All voicemails are confidential.   For Discharge needs, contact Care Management DC Support Team at [phone] opt. 2  Send all requests for admission clinical reviews, approved or denied determinations and any other requests to dedicated fax number below belonging to the campus where the patient is receiving treatment. List of dedicated fax numbers for the Facilities:  FACILITY NAME UR FAX NUMBER   ADMISSION DENIALS (Administrative/Medical Necessity) [phone]   DISCHARGE SUPPORT TEAM (NETWORK) [phone]   PARENT CHILD HEALTH (Maternity/NICU/Pediatrics) [phone]   General acute hospital [phone]   [facility] [phone]   Critical access hospital [phone]   [facility] [phone]   [facility] [phone]   [facility] [phone]   [facility] [phone]   [facility] [phone]   [facility] [phone]   [facility] [phone]   [facility] [phone]   [facility] [phone]

## 2024-09-07 LAB
APTT PPP: 70 SECONDS (ref 23–34)
ATRIAL RATE: 77 BPM
BASOPHILS # BLD AUTO: 0.03 THOUSANDS/ΜL (ref 0–0.1)
BASOPHILS NFR BLD AUTO: 0 % (ref 0–1)
EOSINOPHIL # BLD AUTO: 0.18 THOUSAND/ΜL (ref 0–0.61)
EOSINOPHIL NFR BLD AUTO: 2 % (ref 0–6)
ERYTHROCYTE [DISTWIDTH] IN BLOOD BY AUTOMATED COUNT: 12.5 % (ref 11.6–15.1)
GLUCOSE SERPL-MCNC: 151 MG/DL (ref 65–140)
GLUCOSE SERPL-MCNC: 194 MG/DL (ref 65–140)
GLUCOSE SERPL-MCNC: 221 MG/DL (ref 65–140)
GLUCOSE SERPL-MCNC: 232 MG/DL (ref 65–140)
HCT VFR BLD AUTO: 33.4 % (ref 36.5–49.3)
HGB BLD-MCNC: 11.6 G/DL (ref 12–17)
IMM GRANULOCYTES # BLD AUTO: 0.03 THOUSAND/UL (ref 0–0.2)
IMM GRANULOCYTES NFR BLD AUTO: 0 % (ref 0–2)
LYMPHOCYTES # BLD AUTO: 2.69 THOUSANDS/ΜL (ref 0.6–4.47)
LYMPHOCYTES NFR BLD AUTO: 35 % (ref 14–44)
MCH RBC QN AUTO: 30.9 PG (ref 26.8–34.3)
MCHC RBC AUTO-ENTMCNC: 34.7 G/DL (ref 31.4–37.4)
MCV RBC AUTO: 89 FL (ref 82–98)
MONOCYTES # BLD AUTO: 0.75 THOUSAND/ΜL (ref 0.17–1.22)
MONOCYTES NFR BLD AUTO: 10 % (ref 4–12)
NEUTROPHILS # BLD AUTO: 4.03 THOUSANDS/ΜL (ref 1.85–7.62)
NEUTS SEG NFR BLD AUTO: 53 % (ref 43–75)
NRBC BLD AUTO-RTO: 0 /100 WBCS
P AXIS: 24 DEGREES
PLATELET # BLD AUTO: 286 THOUSANDS/UL (ref 149–390)
PMV BLD AUTO: 11 FL (ref 8.9–12.7)
PR INTERVAL: 126 MS
QRS AXIS: 7 DEGREES
QRSD INTERVAL: 96 MS
QT INTERVAL: 376 MS
QTC INTERVAL: 425 MS
RBC # BLD AUTO: 3.75 MILLION/UL (ref 3.88–5.62)
T WAVE AXIS: 37 DEGREES
VENTRICULAR RATE: 77 BPM
WBC # BLD AUTO: 7.71 THOUSAND/UL (ref 4.31–10.16)

## 2024-09-07 PROCEDURE — 99232 SBSQ HOSP IP/OBS MODERATE 35: CPT | Performed by: NURSE PRACTITIONER

## 2024-09-07 PROCEDURE — 93010 ELECTROCARDIOGRAM REPORT: CPT | Performed by: INTERNAL MEDICINE

## 2024-09-07 PROCEDURE — 82948 REAGENT STRIP/BLOOD GLUCOSE: CPT

## 2024-09-07 PROCEDURE — 99233 SBSQ HOSP IP/OBS HIGH 50: CPT | Performed by: INTERNAL MEDICINE

## 2024-09-07 PROCEDURE — 85025 COMPLETE CBC W/AUTO DIFF WBC: CPT | Performed by: NURSE PRACTITIONER

## 2024-09-07 PROCEDURE — 85730 THROMBOPLASTIN TIME PARTIAL: CPT | Performed by: HOSPITALIST

## 2024-09-07 RX ADMIN — CHLORHEXIDINE GLUCONATE 0.12% ORAL RINSE 15 ML: 1.2 LIQUID ORAL at 09:26

## 2024-09-07 RX ADMIN — AMLODIPINE BESYLATE 5 MG: 5 TABLET ORAL at 09:26

## 2024-09-07 RX ADMIN — CHLORHEXIDINE GLUCONATE 0.12% ORAL RINSE 15 ML: 1.2 LIQUID ORAL at 20:22

## 2024-09-07 RX ADMIN — INSULIN LISPRO 3 UNITS: 100 INJECTION, SOLUTION INTRAVENOUS; SUBCUTANEOUS at 12:41

## 2024-09-07 RX ADMIN — INSULIN LISPRO 1 UNITS: 100 INJECTION, SOLUTION INTRAVENOUS; SUBCUTANEOUS at 18:25

## 2024-09-07 RX ADMIN — INSULIN GLARGINE 6 UNITS: 100 INJECTION, SOLUTION SUBCUTANEOUS at 20:30

## 2024-09-07 RX ADMIN — INSULIN LISPRO 3 UNITS: 100 INJECTION, SOLUTION INTRAVENOUS; SUBCUTANEOUS at 18:25

## 2024-09-07 RX ADMIN — ASPIRIN 81 MG: 81 TABLET, COATED ORAL at 09:26

## 2024-09-07 RX ADMIN — ATORVASTATIN CALCIUM 80 MG: 80 TABLET, FILM COATED ORAL at 18:25

## 2024-09-07 RX ADMIN — METOPROLOL SUCCINATE 12.5 MG: 25 TABLET, EXTENDED RELEASE ORAL at 09:26

## 2024-09-07 RX ADMIN — HEPARIN SODIUM 18 UNITS/KG/HR: 10000 INJECTION, SOLUTION INTRAVENOUS at 23:10

## 2024-09-07 RX ADMIN — INSULIN LISPRO 3 UNITS: 100 INJECTION, SOLUTION INTRAVENOUS; SUBCUTANEOUS at 07:23

## 2024-09-07 RX ADMIN — INSULIN LISPRO 2 UNITS: 100 INJECTION, SOLUTION INTRAVENOUS; SUBCUTANEOUS at 12:41

## 2024-09-07 RX ADMIN — HEPARIN SODIUM 18 UNITS/KG/HR: 10000 INJECTION, SOLUTION INTRAVENOUS at 07:22

## 2024-09-07 RX ADMIN — INSULIN LISPRO 1 UNITS: 100 INJECTION, SOLUTION INTRAVENOUS; SUBCUTANEOUS at 06:03

## 2024-09-07 NOTE — PROGRESS NOTES
[facility]  Progress Note  Name: Aristoteles Toribio Reyes I  MRN: [id number]  Unit/Bed#: PPHP-321-01 I Date of Admission: 9/5/2024   Date of Service: 9/7/2024 I Hospital Day: 2    Assessment & Plan   * CAD, multiple vessel  Assessment & Plan  Initially presented to [facility] for chest pain, found to have elevated troponins without EKG changes.  Underwent cardiac catheterization on 9/4 which showed multivessel disease.  Transferred to [place] for CT surgery evaluation/CABG.  CT surgery following, CABG Wednesday 9/11  Continue beta-blocker, aspirin, statin as well as heparin drip.  LVEF on echo 50%.  No significant valvular disease.  Telemetry    Stroke-like symptoms old CVA on CT  Assessment & Plan  Also with strokelike symptoms on presentation to [facility] with left lower extremity weakness which improved on repeat exam.  Was evaluated by neurology.  CT head showed chronic left cerebellar infarct, no evidence of acute stroke or hemorrhage.  Subsequent MRI brain without any acute findings, Susceptibility focus in the right periatrial white matter corresponds to the calcification on head CT. This is nonspecific and may be due to prior granulomatous process or cavernous malformation.   Continue aspirin, statin.  Neurology signed off.  CT surgery requested neurosurgery evaluation regarding small area of calcification noted on imaging.  Per neurosurgery, this appears to be a calcification however cavernous malformation cannot be ruled out.  Recommending MRI brain with contrast at some point in the future, can be done as outpatient.  Does not need to be done preop.    HTN (hypertension)  Assessment & Plan  BP acceptable.  Continue PTA dose of lisinopril, now on beta-blocker and Norvasc as well.  Monitor    Type 2 diabetes mellitus, without long-term current use of insulin (HCC)  Assessment & Plan  Lab Results   Component Value Date    HGBA1C 8.4 (H) 09/02/2024        Recent Labs     24  1042 24  1603 24  2036 24  0534   POCGLU 262* 156* 236* 151*       Holding home metformin  Continue Lantus 6 units nightly and Humalog 3 units 3 times daily with meals plus sliding scale  Diabetic diet  Monitor Accu-Cheks and adjust regimen as needed               VTE Pharmacologic Prophylaxis:   Moderate Risk (Score 3-4) - Pharmacological DVT Prophylaxis Ordered: heparin drip.    Mobility:   Basic Mobility Inpatient Raw Score: 24  JH-HLM Goal: 8: Walk 250 feet or more  JH-HLM Achieved: 8: Walk 250 feet ot more  JH-HLM Goal achieved. Continue to encourage appropriate mobility.    Patient Centered Rounds: I performed bedside rounds with nursing staff today.   Discussions with Specialists or Other Care Team Provider: nursing    Education and Discussions with Family / Patient: Updated  (wife) at bedside.    Total Time Spent on Date of Encounter in care of patient: 20 mins. This time was spent on one or more of the following: performing physical exam; counseling and coordination of care; obtaining or reviewing history; documenting in the medical record; reviewing/ordering tests, medications or procedures; communicating with other healthcare professionals and discussing with patient's family/caregivers.    Current Length of Stay: 2 day(s)  Current Patient Status: Inpatient   Certification Statement: The patient will continue to require additional inpatient hospital stay due to CABG next week  Discharge Plan: Anticipate discharge in >72 hrs to home.    Code Status: Level 1 - Full Code    Subjective:   No new complaints. No CP.     Objective:     Vitals:   Temp (24hrs), Av.1 °F (36.7 °C), Min:97.9 °F (36.6 °C), Max:98.4 °F (36.9 °C)    Temp:  [97.9 °F (36.6 °C)-98.4 °F (36.9 °C)] 98 °F (36.7 °C)  HR:  [71-84] 71  Resp:  [15-18] 15  BP: (121-149)/(74-86) 128/76  SpO2:  [95 %-100 %] 98 %  There is no height or weight on file to calculate BMI.     Input and  Output Summary (last 24 hours):     Intake/Output Summary (Last 24 hours) at 9/7/2024 0806  Last data filed at 9/7/2024 0601  Gross per 24 hour   Intake 1048.28 ml   Output 1250 ml   Net -201.72 ml       Physical Exam:   Physical Exam  Vitals and nursing note reviewed.   Constitutional:       General: He is not in acute distress.  Cardiovascular:      Rate and Rhythm: Normal rate.   Pulmonary:      Breath sounds: Normal breath sounds.   Abdominal:      Tenderness: There is no abdominal tenderness.   Musculoskeletal:         General: No swelling.   Skin:     General: Skin is warm.   Neurological:      Mental Status: He is alert and oriented to person, place, and time. Mental status is at baseline.   Psychiatric:         Mood and Affect: Mood normal.          Additional Data:     Labs:  Results from last 7 days   Lab Units 09/07/24  0437   WBC Thousand/uL 7.71   HEMOGLOBIN g/dL 11.6*   HEMATOCRIT % 33.4*   PLATELETS Thousands/uL 286   SEGS PCT % 53   LYMPHO PCT % 35   MONO PCT % 10   EOS PCT % 2     Results from last 7 days   Lab Units 09/06/24  0453 09/04/24  0225 09/03/24  0352   SODIUM mmol/L 141   < > 139   POTASSIUM mmol/L 4.0   < > 3.7   CHLORIDE mmol/L 111*   < > 107   CO2 mmol/L 20*   < > 22   BUN mg/dL 19   < > 16   CREATININE mg/dL 0.91   < > 0.95   ANION GAP mmol/L 10   < > 10   CALCIUM mg/dL 8.8   < > 9.2   ALBUMIN g/dL  --   --  4.1   TOTAL BILIRUBIN mg/dL  --   --  0.58   ALK PHOS U/L  --   --  62   ALT U/L  --   --  14   AST U/L  --   --  20   GLUCOSE RANDOM mg/dL 186*   < > 216*    < > = values in this interval not displayed.     Results from last 7 days   Lab Units 09/02/24  1302   INR  1.00     Results from last 7 days   Lab Units 09/07/24  0534 09/06/24 2036 09/06/24  1603 09/06/24  1042 09/06/24  0625 09/05/24  2037 09/05/24  1855 09/05/24  1054 09/05/24  0625 09/04/24  2123 09/04/24  1725 09/04/24  1625   POC GLUCOSE mg/dl 151* 236* 156* 262* 189* 164* 238* 221* 194* 250* 189* 236*     Results  from last 7 days   Lab Units 09/02/24  1006   HEMOGLOBIN A1C % 8.4*     Results from last 7 days   Lab Units 09/02/24  1006   PROCALCITONIN ng/ml <0.05       Lines/Drains:  Invasive Devices       Peripheral Intravenous Line  Duration             Peripheral IV 09/06/24 Dorsal (posterior);Right Forearm 1 day                      Telemetry:  Telemetry Orders (From admission, onward)               24 Hour Telemetry Monitoring  Continuous x 24 Hours (Telem)        Question:  Reason for 24 Hour Telemetry  Answer:  PCI/EP study (including pacer and ICD implementation), Cardiac surgery, MI, abnormal cardiac cath, and chest pain- rule out MI                     Telemetry Reviewed: Normal Sinus Rhythm  Indication for Continued Telemetry Use: Awaiting PCI/EP Study/CABG             Imaging: No pertinent imaging reviewed.    Recent Cultures (last 7 days):         Last 24 Hours Medication List:   Current Facility-Administered Medications   Medication Dose Route Frequency Provider Last Rate    amLODIPine  5 mg Oral Daily SRINIVAS Wilkerson      aspirin  81 mg Oral Daily SRINIVAS Wilkerson      atorvastatin  80 mg Oral Daily With Dinner SRINIVAS Wilkerson      chlorhexidine  15 mL Mouth/Throat Q12H KALYANI SRINIVAS Wilkerson      heparin (porcine)  3-30 Units/kg/hr (Order-Specific) Intravenous Titrated SRINIVAS Wilkerson 18 Units/kg/hr (09/07/24 0722)    heparin (porcine)  2,400 Units Intravenous Q6H PRN SRINIVAS Wilkerson      heparin (porcine)  4,800 Units Intravenous Q6H PRN SRINIVAS Wilkerson      insulin glargine  6 Units Subcutaneous HS Ceci Tate DO      insulin lispro  1-5 Units Subcutaneous TID AC SRINIVAS Wilkerson      insulin lispro  3 Units Subcutaneous TID With Meals Ceci Tate DO      metoprolol succinate  12.5 mg Oral Daily SRINIVAS Wilkerson      nitroglycerin  0.4 mg Sublingual Q5 Min PRN SRINIVAS Wilkerson          Today, Patient Was Seen  By: SRINIVAS Green    **Please Note: This note may have been constructed using a voice recognition system.**

## 2024-09-07 NOTE — ASSESSMENT & PLAN NOTE
With NSTEMI --> MV disease on cardiac cath. Transferred here for CABG eval.   He remains pain free on IV heparin, ASA, statin and BB.   LVEF 50% on echo, no significant valvular disease.  CABG planned for Wed 9/11

## 2024-09-07 NOTE — ASSESSMENT & PLAN NOTE
Also with strokelike symptoms on presentation to [facility] with left lower extremity weakness which improved on repeat exam.  Was evaluated by neurology.  CT head showed chronic left cerebellar infarct, no evidence of acute stroke or hemorrhage.  Subsequent MRI brain without any acute findings, Susceptibility focus in the right periatrial white matter corresponds to the calcification on head CT. This is nonspecific and may be due to prior granulomatous process or cavernous malformation.   Continue aspirin, statin.  Neurology signed off.  CT surgery requested neurosurgery evaluation regarding small area of calcification noted on imaging.  Per neurosurgery, this appears to be a calcification however cavernous malformation cannot be ruled out.  Recommending MRI brain with contrast at some point in the future, can be done as outpatient.  Does not need to be done preop.

## 2024-09-07 NOTE — DISCHARGE SUMMARY
[facility]  Discharge- Aristoteles Toribio Reyes 1965, 58 y.o. male MRN: [id number]  Unit/Bed#: -01 Encounter: [id number]  Primary Care Provider: No primary care provider on file.   Date and time admitted to hospital: 9/2/2024  9:59 AM    * CAD, multiple vessel  Assessment & Plan  Presented with substernal chest pain unresolved with nitro  Troponins elevated 0-hour 517, 2-hour 2088, 4-hour 2187  Patient is on aspirin 325 mg after the stroke, continue with the same dose and Lipitor 80 mg  Cardiac Testing:  Echo: EF 50%  Catheterization: Multiple vessels with 80-90% occlusions  Pending transfer to [place] for CABG.    Stroke-like symptoms old CVA on CT  Assessment & Plan  Patient presented with chest pain and numbness and tingling in right upper and lower extremities.  Patient maintained on stroke pathway  MRI completed, negative for acute ischemia   Continue with Aspirin, Statin    Type 2 diabetes mellitus, without long-term current use of insulin (HCC)  Assessment & Plan  Lab Results   Component Value Date    HGBA1C 8.4 (H) 09/02/2024       Recent Labs     09/06/24  1042 09/06/24  1603 09/06/24  2036 09/07/24  0534   POCGLU 262* 156* 236* 151*         Blood Sugar Average: Last 72 hrs:  (P) 211.9384016068269895  Will check glycohemoglobin  hold home dose of metformin  Insulin sliding scale    NSTEMI (non-ST elevated myocardial infarction) (HCC)  Assessment & Plan  See plan as noted above.    HTN (hypertension)  Assessment & Plan  Chronic, hypertensive urgency on admission, which is now resolved.  Will continue with current regimen with hold parameters  Monitor vital signs per unit routine        Medical Problems       Resolved Problems  Date Reviewed: 9/7/2024   None       Discharging Physician / Practitioner: SRINIVAS Wilkerson  PCP: No primary care provider on file.  Admission Date:   Admission Orders (From admission, onward)       Ordered        09/02/24 1245   Inpatient Admission  Once                          Discharge Date: 09/05/24    Consultations During Hospital Stay:  IP CONSULT TO NEUROLOGY  IP CONSULT TO CARDIOLOGY    Procedures Performed:   Cardiac Cath (9/4/24)    Significant Findings / Test Results:   VAS carotid complete study   Final Result by Danial Ba DO (09/04 2146)      VAS lower limb vein mapping bypass graft   Final Result by Danial Ba DO (09/04 2145)      MRI brain wo contrast   Final Result by Ernesto Hardin MD (09/03 1600)      1. No acute process seen   2. Susceptibility focus in the right periatrial white matter corresponds to the calcification on head CT. This is nonspecific and may be due to prior granulomatous process or cavernous malformation.      Workstation performed: [id number]         CTA dissection protocol chest/abdomen/pelvis   Final Result by Konstantin Contreras MD (09/02 2424)      No acute findings in the chest, abdomen, or pelvis. Specifically, no findings of aortic aortic syndrome.                  Resident: HERON BEST I, the attending radiologist, have reviewed the images and agree with the final report above.      Workstation performed: [id number]         CTA stroke alert (head/neck)   Final Result by Masha Callaway MD (09/02 1795)      1.   Patent major vessels of the [place] of c[place] without high-grade stenosis.  No aneurysm.   2.  Atherosclerotic changes without hemodynamically significant stenosis in the cervical carotid or vertebral arteries.               I personally discussed this study with JAMES REDMOND on 9/2/2024 10: 41 AM.               Workstation performed: [id number]         CT stroke alert brain   Final Result by Masha Callaway MD (09/02 1229)   Addendum (preliminary) 1 of 1 by Masha Callaway MD (09/02 1229)   ADDENDUM:      There is nonspecific 5 mm calcification versus calcific lesion in the    right temporo-occipital junction (series 2 image 20). Recommend further     characterization with brain MRI without and with contrast if no prior    outside imaging is available.         This study was marked in EPIC for notification and follow-up.               Final      1.  No acute intracranial CT abnormality.   2.  Chronic appearing inferior left cerebellar infarct.                     I personally discussed this study with JAMES REDMOND on 9/2/2024 10:    41 AM.         Workstation performed: [id number]             Incidental Findings:   None    Test Results Pending at Discharge (will require follow up):   None     Outpatient Tests Requested:  Pending hospitalization course at [facility]    Complications:  None    Reason for Admission: Chest Pain    Hospital Course:   Aristoteles Toribio Reyes is a 58 y.o. male patient with past medical history of CVA, Hypertension, DM 2 who originally presented to the hospital on 9/2/2024 due to retrosternal chest pain that began the night prior to admission.  He also had some concerning symptoms for stroke, and therefore was placed on the stroke pathway and was ruled out for a stroke by MRI and Neurology evaluation.  Patient was taken for a cardiac cath on 9/4 which found patient to have multi-vessel disease and was recommended for transfer to [facility] for CT surgery evaluation.  Vital signs stable.  Maintained on IV Heparin Drip.    Please see above list of diagnoses and related plan for additional information.     Condition at Discharge: stable    Discharge Day Visit / Exam:   * Please refer to separate progress note for these details *    Discussion with Family: Patient declined call to .     Discharge instructions/Information to patient and family:   See after visit summary for information provided to patient and family.      Provisions for Follow-Up Care:  See after visit summary for information related to follow-up care and any pertinent home health orders.      Mobility at time of Discharge:   Basic Mobility Inpatient Raw Score:  24  JH-HLM Goal: 8: Walk 250 feet or more  JH-HLM Achieved: 7: Walk 25 feet or more  HLM Goal NOT achieved. Continue to encourage mobility in post discharge setting.     Disposition:   Acute Care Hospital Transfer to [facility]    Planned Readmission: Yes     Discharge Statement:  I spent 40 minutes discharging the patient. This time was spent on the day of discharge. I had direct contact with the patient on the day of discharge. Greater than 50% of the total time was spent examining patient, answering all patient questions, arranging and discussing plan of care with patient as well as directly providing post-discharge instructions.  Additional time then spent on discharge activities.    Discharge Medications:  See after visit summary for reconciled discharge medications provided to patient and/or family.      **Please Note: This note may have been constructed using a voice recognition system**

## 2024-09-07 NOTE — ASSESSMENT & PLAN NOTE
Presented with substernal chest pain unresolved with nitro  Troponins elevated 0-hour 517, 2-hour 2088, 4-hour 2187  Patient is on aspirin 325 mg after the stroke, continue with the same dose and Lipitor 80 mg  Cardiac Testing:  Echo: EF 50%  Catheterization: Multiple vessels with 80-90% occlusions  Pending transfer to [place] for CABG.

## 2024-09-07 NOTE — ASSESSMENT & PLAN NOTE
BP well controlled; on BB: metoprolol succinate 12.5mg daily and lisinopril 10mg daily previously.  Lisinopril stopped in anticipation of the surgery.  Amlodipine 5 started

## 2024-09-07 NOTE — ASSESSMENT & PLAN NOTE
Lab Results   Component Value Date    HGBA1C 8.4 (H) 09/02/2024       Recent Labs     09/06/24  1042 09/06/24  1603 09/06/24 2036 09/07/24  0534   POCGLU 262* 156* 236* 151*         Blood Sugar Average: Last 72 hrs:  (P) [id number]  Will check glycohemoglobin  hold home dose of metformin  Insulin sliding scale

## 2024-09-07 NOTE — ASSESSMENT & PLAN NOTE
Initially presented to [facility] for chest pain, found to have elevated troponins without EKG changes.  Underwent cardiac catheterization on 9/4 which showed multivessel disease.  Transferred to [place] for CT surgery evaluation/CABG.  CT surgery following, CABG Wednesday 9/11  Continue beta-blocker, aspirin, statin as well as heparin drip.  LVEF on echo 50%.  No significant valvular disease.  Telemetry

## 2024-09-07 NOTE — ASSESSMENT & PLAN NOTE
Lab Results   Component Value Date    HGBA1C 8.4 (H) 09/02/2024       Recent Labs     09/06/24  1042 09/06/24  1603 09/06/24 2036 09/07/24  0534   POCGLU 262* 156* 236* 151*       Holding home metformin  Continue Lantus 6 units nightly and Humalog 3 units 3 times daily with meals plus sliding scale  Diabetic diet  Monitor Accu-Cheks and adjust regimen as needed

## 2024-09-07 NOTE — PROGRESS NOTES
Progress Note - Cardiology   Aristoteles Toribio Reyes 58 y.o. male MRN: [id number]  Unit/Bed#: PPHP-321-01 Encounter: [id number]    Assessment & Plan  CAD, multiple vessel  With NSTEMI --> MV disease on cardiac cath. Transferred here for CABG eval.   He remains pain free on IV heparin, ASA, statin and BB.   LVEF 50% on echo, no significant valvular disease.  CABG planned for Wed 9/11  HTN (hypertension)  BP well controlled; on BB: metoprolol succinate 12.5mg daily and lisinopril 10mg daily previously.  Lisinopril stopped in anticipation of the surgery.  Amlodipine 5 started    Type 2 diabetes mellitus, without long-term current use of insulin ([facility])  Lab Results   Component Value Date    HGBA1C 8.4 (H) 09/02/2024     Stroke-like symptoms old CVA on CT  Evaluated by neurology; cleared for AC/AP. Neurology signed off at [facility].         Subjective/Objective     Subjective:  Did not sleep well.  Otherwise no cardiac complaints.  Had tachycardia overnight which was sinus.    Objective:    Vitals: /77   Pulse 76   Temp 98.2 °F (36.8 °C)   Resp 15   SpO2 98%   There were no vitals filed for this visit.  Orthostatic Blood Pressures      Flowsheet Row Most Recent Value   Blood Pressure 125/77 filed at 09/07/2024 1057              Intake/Output Summary (Last 24 hours) at 9/7/2024 1146  Last data filed at 9/7/2024 0930  Gross per 24 hour   Intake 1085.9 ml   Output 1150 ml   Net -64.1 ml     Physical Exam:   General appearance: alert and in no acute distress  Head: Normocephalic, without obvious abnormality, atraumatic  Neck: no carotid bruit, no JVD and supple, symmetrical, trachea midline  Lungs: clear to auscultation bilaterally. Normal air entry. Normal effort.  Heart: S1, S2 normal and no S3 or S4. No murmurs.  Abdomen: soft, non-tender; bowel sounds normal; no masses,  no organomegaly  Extremities: extremities normal, atraumatic, no cyanosis or edema  Pulses: 2+ and symmetric bilaterally  Skin: Skin color,  texture, turgor normal. No rashes or lesions  Neurologic: Grossly normal. Alert and oriented.    Medications:    Current Facility-Administered Medications:     amLODIPine (NORVASC) tablet 5 mg, 5 mg, Oral, Daily, SRINIVAS Wilkerson, 5 mg at 09/07/24 0926    aspirin (ECOTRIN LOW STRENGTH) EC tablet 81 mg, 81 mg, Oral, Daily, SRINIVAS Wilkerson, 81 mg at 09/07/24 0926    atorvastatin (LIPITOR) tablet 80 mg, 80 mg, Oral, Daily With Dinner, SRINIVAS Wilkerson, 80 mg at 09/06/24 1602    chlorhexidine (PERIDEX) 0.12 % oral rinse 15 mL, 15 mL, Mouth/Throat, Q12H KALYANI, SRINIVAS Wilkerson, 15 mL at 09/07/24 0926    heparin (porcine) 25,000 units in 0.45% NaCl 250 mL infusion (premix), 3-30 Units/kg/hr (Order-Specific), Intravenous, Titrated, SRINIVAS Wilkerson, Last Rate: 10.8 mL/hr at 09/07/24 0800, 18 Units/kg/hr at 09/07/24 0800    heparin (porcine) injection 2,400 Units, 2,400 Units, Intravenous, Q6H PRN, SRINIVAS Wilkerson    heparin (porcine) injection 4,800 Units, 4,800 Units, Intravenous, Q6H PRN, SRINIVAS Wilkerson    insulin glargine (LANTUS) subcutaneous injection 6 Units 0.06 mL, 6 Units, Subcutaneous, HS, Ceci Tate DO, 6 Units at 09/06/24 2151    insulin lispro (HumALOG/ADMELOG) 100 units/mL subcutaneous injection 1-5 Units, 1-5 Units, Subcutaneous, TID AC, 1 Units at 09/07/24 0603 **AND** Fingerstick Glucose (POCT), , , TID ACEmmy CRNP    insulin lispro (HumALOG/ADMELOG) 100 units/mL subcutaneous injection 3 Units, 3 Units, Subcutaneous, TID With Meals, Ceci Tate DO, 3 Units at 09/07/24 0723    metoprolol succinate (TOPROL-XL) 24 hr tablet 12.5 mg, 12.5 mg, Oral, Daily, SRINIVAS Wilkerson, 12.5 mg at 09/07/24 0926    nitroglycerin (NITROSTAT) SL tablet 0.4 mg, 0.4 mg, Sublingual, Q5 Min PRN, SRINIVAS Wilkerson    Lab Results:      Results from last 7 days   Lab Units 09/07/24  0437 09/06/24  0453 09/05/24  0838   WBC  Thousand/uL 7.71 6.78 7.09   HEMOGLOBIN g/dL 11.6* 11.5* 12.2   HEMATOCRIT % 33.4* 33.6* 36.6   PLATELETS Thousands/uL 286 286 296     Results from last 7 days   Lab Units 09/02/24  1006   TRIGLYCERIDES mg/dL 185*   HDL mg/dL 49     Results from last 7 days   Lab Units 09/06/24  0453 09/05/24  0838 09/04/24  0225 09/03/24  0352   SODIUM mmol/L 141 137 139 139   POTASSIUM mmol/L 4.0 3.9 3.6 3.7   CHLORIDE mmol/L 111* 106 107 107   CO2 mmol/L 20* 22 23 22   BUN mg/dL 19 21 22 16   CREATININE mg/dL 0.91 1.07 1.10 0.95   CALCIUM mg/dL 8.8 8.7 9.0 9.2   ALK PHOS U/L  --   --   --  62   ALT U/L  --   --   --  14   AST U/L  --   --   --  20     Results from last 7 days   Lab Units 09/07/24  0437 09/06/24  0230 09/05/24  2041 09/02/24  1944 09/02/24  1302 09/02/24  1006   INR   --   --   --   --  1.00 0.97   PTT seconds 70* 66* 73*   < > 29 29    < > = values in this interval not displayed.     Results from last 7 days   Lab Units 09/05/24  0838 09/04/24  1124 09/03/24  0352   MAGNESIUM mg/dL 2.0 1.5* 1.4*       Telemetry: Personally reviewed. Sinus tachycardia overnight    Echo:  Left Ventricle: Left ventricular cavity size is normal. Wall thickness is normal. The left ventricular ejection fraction is 50%. Systolic function is low normal.  Mild septal and apical hypokinesis. Diastolic function is mildly abnormal, consistent with grade I (abnormal) relaxation.     Cardiac Cath:   Prox LAD to Mid LAD lesion is 80% stenosed.    Prox RCA to Mid RCA lesion is 80% stenosed.    Mid Cx lesion is 40% stenosed.     Multivessel CA; severe multivessel diffuse disease   Mid RCA 80% segmental stenosis   Prox to mid LAD diffuse 80-90% stenosis might have decent landing LIMA target distally  Diagonal 1 has 80% but smaller caliber   Lcx/Om1 has 30% stenosis      LVEDP 12mmHg  Right radial access status post TR band

## 2024-09-08 LAB
APTT PPP: 78 SECONDS (ref 23–34)
BASOPHILS # BLD AUTO: 0.04 THOUSANDS/ΜL (ref 0–0.1)
BASOPHILS NFR BLD AUTO: 1 % (ref 0–1)
EOSINOPHIL # BLD AUTO: 0.22 THOUSAND/ΜL (ref 0–0.61)
EOSINOPHIL NFR BLD AUTO: 3 % (ref 0–6)
ERYTHROCYTE [DISTWIDTH] IN BLOOD BY AUTOMATED COUNT: 12.7 % (ref 11.6–15.1)
GLUCOSE SERPL-MCNC: 134 MG/DL (ref 65–140)
GLUCOSE SERPL-MCNC: 181 MG/DL (ref 65–140)
GLUCOSE SERPL-MCNC: 204 MG/DL (ref 65–140)
GLUCOSE SERPL-MCNC: 305 MG/DL (ref 65–140)
HCT VFR BLD AUTO: 32.2 % (ref 36.5–49.3)
HGB BLD-MCNC: 11.1 G/DL (ref 12–17)
IMM GRANULOCYTES # BLD AUTO: 0.03 THOUSAND/UL (ref 0–0.2)
IMM GRANULOCYTES NFR BLD AUTO: 0 % (ref 0–2)
LYMPHOCYTES # BLD AUTO: 2.62 THOUSANDS/ΜL (ref 0.6–4.47)
LYMPHOCYTES NFR BLD AUTO: 38 % (ref 14–44)
MCH RBC QN AUTO: 31 PG (ref 26.8–34.3)
MCHC RBC AUTO-ENTMCNC: 34.5 G/DL (ref 31.4–37.4)
MCV RBC AUTO: 90 FL (ref 82–98)
MONOCYTES # BLD AUTO: 0.59 THOUSAND/ΜL (ref 0.17–1.22)
MONOCYTES NFR BLD AUTO: 9 % (ref 4–12)
NEUTROPHILS # BLD AUTO: 3.36 THOUSANDS/ΜL (ref 1.85–7.62)
NEUTS SEG NFR BLD AUTO: 49 % (ref 43–75)
NRBC BLD AUTO-RTO: 0 /100 WBCS
PLATELET # BLD AUTO: 295 THOUSANDS/UL (ref 149–390)
PMV BLD AUTO: 11.8 FL (ref 8.9–12.7)
RBC # BLD AUTO: 3.58 MILLION/UL (ref 3.88–5.62)
WBC # BLD AUTO: 6.86 THOUSAND/UL (ref 4.31–10.16)

## 2024-09-08 PROCEDURE — 82948 REAGENT STRIP/BLOOD GLUCOSE: CPT

## 2024-09-08 PROCEDURE — 85025 COMPLETE CBC W/AUTO DIFF WBC: CPT | Performed by: NURSE PRACTITIONER

## 2024-09-08 PROCEDURE — 99233 SBSQ HOSP IP/OBS HIGH 50: CPT | Performed by: INTERNAL MEDICINE

## 2024-09-08 PROCEDURE — 85730 THROMBOPLASTIN TIME PARTIAL: CPT | Performed by: INTERNAL MEDICINE

## 2024-09-08 PROCEDURE — 99232 SBSQ HOSP IP/OBS MODERATE 35: CPT | Performed by: NURSE PRACTITIONER

## 2024-09-08 RX ADMIN — HEPARIN SODIUM 18 UNITS/KG/HR: 10000 INJECTION, SOLUTION INTRAVENOUS at 21:03

## 2024-09-08 RX ADMIN — INSULIN LISPRO 3 UNITS: 100 INJECTION, SOLUTION INTRAVENOUS; SUBCUTANEOUS at 08:36

## 2024-09-08 RX ADMIN — INSULIN LISPRO 1 UNITS: 100 INJECTION, SOLUTION INTRAVENOUS; SUBCUTANEOUS at 11:20

## 2024-09-08 RX ADMIN — CHLORHEXIDINE GLUCONATE 0.12% ORAL RINSE 15 ML: 1.2 LIQUID ORAL at 21:06

## 2024-09-08 RX ADMIN — INSULIN LISPRO 3 UNITS: 100 INJECTION, SOLUTION INTRAVENOUS; SUBCUTANEOUS at 16:29

## 2024-09-08 RX ADMIN — METOPROLOL SUCCINATE 12.5 MG: 25 TABLET, EXTENDED RELEASE ORAL at 08:36

## 2024-09-08 RX ADMIN — ATORVASTATIN CALCIUM 80 MG: 80 TABLET, FILM COATED ORAL at 16:29

## 2024-09-08 RX ADMIN — AMLODIPINE BESYLATE 5 MG: 5 TABLET ORAL at 08:36

## 2024-09-08 RX ADMIN — ASPIRIN 81 MG: 81 TABLET, COATED ORAL at 08:36

## 2024-09-08 RX ADMIN — INSULIN GLARGINE 6 UNITS: 100 INJECTION, SOLUTION SUBCUTANEOUS at 21:44

## 2024-09-08 RX ADMIN — CHLORHEXIDINE GLUCONATE 0.12% ORAL RINSE 15 ML: 1.2 LIQUID ORAL at 08:36

## 2024-09-08 RX ADMIN — INSULIN LISPRO 3 UNITS: 100 INJECTION, SOLUTION INTRAVENOUS; SUBCUTANEOUS at 11:20

## 2024-09-08 NOTE — PLAN OF CARE
Problem: PAIN - ADULT  Goal: Verbalizes/displays adequate comfort level or baseline comfort level  Description: Interventions:  - Encourage patient to monitor pain and request assistance  - Assess pain using appropriate pain scale  - Administer analgesics based on type and severity of pain and evaluate response  - Implement non-pharmacological measures as appropriate and evaluate response  - Consider cultural and social influences on pain and pain management  - Notify physician/advanced practitioner if interventions unsuccessful or patient reports new pain  Outcome: Progressing     Problem: INFECTION - ADULT  Goal: Absence or prevention of progression during hospitalization  Description: INTERVENTIONS:  - Assess and monitor for signs and symptoms of infection  - Monitor lab/diagnostic results  - Monitor all insertion sites, i.e. indwelling lines, tubes, and drains  - Monitor endotracheal if appropriate and nasal secretions for changes in amount and color  - [place] appropriate cooling/warming therapies per order  - Administer medications as ordered  - Instruct and encourage patient and family to use good hand hygiene technique  - Identify and instruct in appropriate isolation precautions for identified infection/condition  Outcome: Progressing  Goal: Absence of fever/infection during neutropenic period  Description: INTERVENTIONS:  - Monitor WBC    Outcome: Progressing     Problem: SAFETY ADULT  Goal: Patient will remain free of falls  Description: INTERVENTIONS:  - Educate patient/family on patient safety including physical limitations  - Instruct patient to call for assistance with activity   - Consult OT/PT to assist with strengthening/mobility   - Keep Call bell within reach  - Keep bed low and locked with side rails adjusted as appropriate  - Keep care items and personal belongings within reach  - Initiate and maintain comfort rounds  - Make Fall Risk Sign visible to staff  - Apply yellow socks and bracelet  for high fall risk patients  - Consider moving patient to room near nurses station  Outcome: Progressing  Goal: Maintain or return to baseline ADL function  Description: INTERVENTIONS:  -  Assess patient's ability to carry out ADLs; assess patient's baseline for ADL function and identify physical deficits which impact ability to perform ADLs (bathing, care of mouth/teeth, toileting, grooming, dressing, etc.)  - Assess/evaluate cause of self-care deficits   - Assess range of motion  - Assess patient's mobility; develop plan if impaired  - Assess patient's need for assistive devices and provide as appropriate  - Encourage maximum independence but intervene and supervise when necessary  - Involve family in performance of ADLs  - Assess for home care needs following discharge   - Consider OT consult to assist with ADL evaluation and planning for discharge  - Provide patient education as appropriate  Outcome: Progressing  Goal: Maintains/Returns to pre admission functional level  Description: INTERVENTIONS:  - Perform AM-PAC 6 Click Basic Mobility/ Daily Activity assessment daily.  - Set and communicate daily mobility goal to care team and patient/family/caregiver.   - Collaborate with rehabilitation services on mobility goals if consulted  Problem: DISCHARGE PLANNING  Goal: Discharge to home or other facility with appropriate resources  Description: INTERVENTIONS:  - Identify barriers to discharge w/patient and caregiver  - Arrange for needed discharge resources and transportation as appropriate  - Identify discharge learning needs (meds, wound care, etc.)  - Arrange for interpretive services to assist at discharge as needed  - Refer to Case Management Department for coordinating discharge planning if the patient needs post-hospital services based on physician/advanced practitioner order or complex needs related to functional status, cognitive ability, or social support system  Outcome: Progressing     Problem:  Knowledge Deficit  Goal: Patient/family/caregiver demonstrates understanding of disease process, treatment plan, medications, and discharge instructions  Description: Complete learning assessment and assess knowledge base.  Interventions:  - Provide teaching at level of understanding  - Provide teaching via preferred learning methods  Outcome: Progressing     Problem: NEUROSENSORY - ADULT  Goal: Achieves stable or improved neurological status  Description: INTERVENTIONS  - Monitor and report changes in neurological status  - Monitor vital signs such as temperature, blood pressure, glucose, and any other labs ordered   - Initiate measures to prevent increased intracranial pressure  - Monitor for seizure activity and implement precautions if appropriate      Outcome: Progressing  Goal: Remains free of injury related to seizures activity  Description: INTERVENTIONS  - Maintain airway, patient safety  and administer oxygen as ordered  - Monitor patient for seizure activity, document and report duration and description of seizure to physician/advanced practitioner  - If seizure occurs,  ensure patient safety during seizure  - Reorient patient post seizure  - Seizure pads on all 4 side rails  - Instruct patient/family to notify RN of any seizure activity including if an aura is experienced  - Instruct patient/family to call for assistance with activity based on nursing assessment  - Administer anti-seizure medications if ordered    Outcome: Progressing  Goal: Achieves maximal functionality and self care  Description: INTERVENTIONS  - Monitor swallowing and airway patency with patient fatigue and changes in neurological status  - Encourage and assist patient to increase activity and self care.   - Encourage visually impaired, hearing impaired and aphasic patients to use assistive/communication devices  Outcome: Progressing     - Out of bed for toileting  - Record patient progress and toleration of activity level   Outcome:  Progressing

## 2024-09-08 NOTE — PROGRESS NOTES
[facility]  Progress Note  Name: Aristoteles Toribio Reyes I  MRN: [id number]  Unit/Bed#: PPHP-321-01 I Date of Admission: 9/5/2024   Date of Service: 9/8/2024 I Hospital Day: 3    Assessment & Plan   * CAD, multiple vessel  Assessment & Plan  Initially presented to [facility] for chest pain, found to have elevated troponins without EKG changes.  Underwent cardiac catheterization on 9/4 which showed multivessel disease.  Transferred to [place] for CT surgery evaluation/CABG.  CT surgery following, CABG Wednesday 9/11  Continue beta-blocker, aspirin, statin as well as heparin drip.  LVEF on echo 50%.  No significant valvular disease.  Telemetry    Stroke-like symptoms old CVA on CT  Assessment & Plan  Also with strokelike symptoms on presentation to [facility] with left lower extremity weakness which improved on repeat exam.  Was evaluated by neurology. Now resolved.   CT head showed chronic left cerebellar infarct, no evidence of acute stroke or hemorrhage.  Subsequent MRI brain without any acute findings, Susceptibility focus in the right periatrial white matter corresponds to the calcification on head CT. This is nonspecific and may be due to prior granulomatous process or cavernous malformation.   Continue aspirin, statin.  Neurology signed off.  CT surgery requested neurosurgery evaluation regarding small area of calcification noted on imaging.  Per neurosurgery, this appears to be a calcification however cavernous malformation cannot be ruled out.  Recommending MRI brain with contrast at some point in the future, can be done as outpatient.  Does not need to be done preop.    HTN (hypertension)  Assessment & Plan  BP acceptable.  Continue PTA dose of lisinopril, now on beta-blocker and Norvasc as well.  Monitor    Type 2 diabetes mellitus, without long-term current use of insulin (HCC)  Assessment & Plan  Lab Results   Component Value Date    HGBA1C 8.4  (H) 2024       Recent Labs     24  1055 24  1606 24  0532   POCGLU 232* 194* 221* 134       Holding home metformin  Continue Lantus 6 units nightly and Humalog 3 units 3 times daily with meals plus sliding scale  Diabetic diet  Monitor Accu-Cheks and adjust regimen as needed               VTE Pharmacologic Prophylaxis:   Moderate Risk (Score 3-4) - Pharmacological DVT Prophylaxis Ordered: heparin drip.    Mobility:   Basic Mobility Inpatient Raw Score: 24  JH-HLM Goal: 8: Walk 250 feet or more  JH-HLM Achieved: 8: Walk 250 feet ot more  JH-HLM Goal achieved. Continue to encourage appropriate mobility.    Patient Centered Rounds: I evaluated the patient without nursing staff present due to Rn updated via [place]    Discussions with Specialists or Other Care Team Provider: nursing    Education and Discussions with Family / Patient: Updated  (brother) at bedside.    Total Time Spent on Date of Encounter in care of patient: 20 mins. This time was spent on one or more of the following: performing physical exam; counseling and coordination of care; obtaining or reviewing history; documenting in the medical record; reviewing/ordering tests, medications or procedures; communicating with other healthcare professionals and discussing with patient's family/caregivers.    Current Length of Stay: 3 day(s)  Current Patient Status: Inpatient   Certification Statement: The patient will continue to require additional inpatient hospital stay due to CABG Wednesday  Discharge Plan: Anticipate discharge in >72 hrs to home.    Code Status: Level 1 - Full Code    Subjective:   Patient complains of a runny nose.  He has no cough, sore throat, fevers, chills.  Overall feels well.  No chest pain or shortness of breath.  Requesting nutritional drinks with breakfast and lunch.    Objective:     Vitals:   Temp (24hrs), Av.2 °F (36.8 °C), Min:97.7 °F (36.5 °C), Max:98.8 °F (37.1 °C)    Temp:   [97.7 °F (36.5 °C)-98.8 °F (37.1 °C)] 98.1 °F (36.7 °C)  HR:  [25-79] 25  Resp:  [15] 15  BP: (120-149)/(72-82) 149/82  SpO2:  [95 %-98 %] 95 %  Body mass index is 24.51 kg/m².     Input and Output Summary (last 24 hours):     Intake/Output Summary (Last 24 hours) at 9/8/2024 0824  Last data filed at 9/8/2024 0748  Gross per 24 hour   Intake 426.6 ml   Output 1475 ml   Net -1048.4 ml       Physical Exam:   Physical Exam  Vitals and nursing note reviewed.   Constitutional:       General: He is not in acute distress.  Cardiovascular:      Rate and Rhythm: Normal rate.   Pulmonary:      Breath sounds: Normal breath sounds.   Abdominal:      Tenderness: There is no abdominal tenderness.   Musculoskeletal:         General: No swelling.   Skin:     General: Skin is warm.   Neurological:      Mental Status: He is alert and oriented to person, place, and time. Mental status is at baseline.   Psychiatric:         Mood and Affect: Mood normal.          Additional Data:     Labs:  Results from last 7 days   Lab Units 09/08/24  0451   WBC Thousand/uL 6.86   HEMOGLOBIN g/dL 11.1*   HEMATOCRIT % 32.2*   PLATELETS Thousands/uL 295   SEGS PCT % 49   LYMPHO PCT % 38   MONO PCT % 9   EOS PCT % 3     Results from last 7 days   Lab Units 09/06/24  0453 09/04/24  0225 09/03/24  0352   SODIUM mmol/L 141   < > 139   POTASSIUM mmol/L 4.0   < > 3.7   CHLORIDE mmol/L 111*   < > 107   CO2 mmol/L 20*   < > 22   BUN mg/dL 19   < > 16   CREATININE mg/dL 0.91   < > 0.95   ANION GAP mmol/L 10   < > 10   CALCIUM mg/dL 8.8   < > 9.2   ALBUMIN g/dL  --   --  4.1   TOTAL BILIRUBIN mg/dL  --   --  0.58   ALK PHOS U/L  --   --  62   ALT U/L  --   --  14   AST U/L  --   --  20   GLUCOSE RANDOM mg/dL 186*   < > 216*    < > = values in this interval not displayed.     Results from last 7 days   Lab Units 09/02/24  1302   INR  1.00     Results from last 7 days   Lab Units 09/08/24  0532 09/07/24  2025 09/07/24  1606 09/07/24  1055 09/07/24  0534  09/06/24 2036 09/06/24  1603 09/06/24  1042 09/06/24  0625 09/05/24  2037 09/05/24  1855 09/05/24  1054   POC GLUCOSE mg/dl 134 221* 194* 232* 151* 236* 156* 262* 189* 164* 238* 221*     Results from last 7 days   Lab Units 09/02/24  1006   HEMOGLOBIN A1C % 8.4*     Results from last 7 days   Lab Units 09/02/24  1006   PROCALCITONIN ng/ml <0.05       Lines/Drains:  Invasive Devices       Peripheral Intravenous Line  Duration             Peripheral IV 09/06/24 Dorsal (posterior);Right Forearm 2 days                      Telemetry:  Telemetry Orders (From admission, onward)               24 Hour Telemetry Monitoring  Continuous x 24 Hours (Telem)        Question:  Reason for 24 Hour Telemetry  Answer:  PCI/EP study (including pacer and ICD implementation), Cardiac surgery, MI, abnormal cardiac cath, and chest pain- rule out MI                     Telemetry Reviewed: Normal Sinus Rhythm  Indication for Continued Telemetry Use: Awaiting PCI/EP Study/CABG             Imaging: No pertinent imaging reviewed.    Recent Cultures (last 7 days):         Last 24 Hours Medication List:   Current Facility-Administered Medications   Medication Dose Route Frequency Provider Last Rate    amLODIPine  5 mg Oral Daily SRINIVAS Wilkerson      aspirin  81 mg Oral Daily SRINIVAS Wilkerson      atorvastatin  80 mg Oral Daily With Dinner SRINIVAS Wilkerson      chlorhexidine  15 mL Mouth/Throat Q12H KALYANI SRINIVAS Wilkerson      heparin (porcine)  3-30 Units/kg/hr (Order-Specific) Intravenous Titrated SRINIVAS Wilkerson 18 Units/kg/hr (09/07/24 2310)    heparin (porcine)  2,400 Units Intravenous Q6H PRN SRINIVAS Wilkerson      heparin (porcine)  4,800 Units Intravenous Q6H PRN SRINIVAS Wilkerson      insulin glargine  6 Units Subcutaneous HS Ceci Tate DO      insulin lispro  1-5 Units Subcutaneous TID AC SRINIVAS Wilkerson      insulin lispro  3 Units Subcutaneous TID With Meals  Ceci Tate,       metoprolol succinate  12.5 mg Oral Daily SRINIVAS Wilkerson      nitroglycerin  0.4 mg Sublingual Q5 Min PRN SRINIVAS Wilkerson          Today, Patient Was Seen By: SRINIVAS Green    **Please Note: This note may have been constructed using a voice recognition system.**

## 2024-09-08 NOTE — ASSESSMENT & PLAN NOTE
BP well controlled; on BB: metoprolol succinate 12.5mg daily and lisinopril 10mg daily previously.  Lisinopril stopped in anticipation of the surgery.  Amlodipine 5 started. BP controlled on average.

## 2024-09-08 NOTE — ASSESSMENT & PLAN NOTE
Lab Results   Component Value Date    HGBA1C 8.4 (H) 09/02/2024       Recent Labs     09/07/24  1055 09/07/24  1606 09/07/24 2025 09/08/24  0532   POCGLU 232* 194* 221* 134       Holding home metformin  Continue Lantus 6 units nightly and Humalog 3 units 3 times daily with meals plus sliding scale  Diabetic diet  Monitor Accu-Cheks and adjust regimen as needed

## 2024-09-08 NOTE — ASSESSMENT & PLAN NOTE
Also with strokelike symptoms on presentation to [facility] with left lower extremity weakness which improved on repeat exam.  Was evaluated by neurology. Now resolved.   CT head showed chronic left cerebellar infarct, no evidence of acute stroke or hemorrhage.  Subsequent MRI brain without any acute findings, Susceptibility focus in the right periatrial white matter corresponds to the calcification on head CT. This is nonspecific and may be due to prior granulomatous process or cavernous malformation.   Continue aspirin, statin.  Neurology signed off.  CT surgery requested neurosurgery evaluation regarding small area of calcification noted on imaging.  Per neurosurgery, this appears to be a calcification however cavernous malformation cannot be ruled out.  Recommending MRI brain with contrast at some point in the future, can be done as outpatient.  Does not need to be done preop.

## 2024-09-08 NOTE — PROGRESS NOTES
"Progress Note - Cardiology   Aristoteles Toribio Reyes 58 y.o. male MRN: [id number]  Unit/Bed#: PPHP-321-01 Encounter: [id number]    Assessment & Plan  CAD, multiple vessel  With NSTEMI --> MV disease on cardiac cath. Transferred here for CABG eval.   He remains pain free on IV heparin, ASA, statin and BB.   LVEF 50% on echo, no significant valvular disease.  CABG planned for Wed 9/11  HTN (hypertension)  BP well controlled; on BB: metoprolol succinate 12.5mg daily and lisinopril 10mg daily previously.  Lisinopril stopped in anticipation of the surgery.  Amlodipine 5 started. BP controlled on average.    Type 2 diabetes mellitus, without long-term current use of insulin ([facility])  Lab Results   Component Value Date    HGBA1C 8.4 (H) 09/02/2024   Per primary team.  Stroke-like symptoms old CVA on CT  Evaluated by neurology; cleared for AC/AP. Neurology signed off at [facility].         Subjective/Objective     Subjective:  Did not sleep well.  Otherwise no cardiac complaints.  Had tachycardia overnight which was sinus.    Objective:  Vitals: /82   Pulse 77   Temp 98.1 °F (36.7 °C)   Resp 15   Ht 5' 2\" (1.575 m)   SpO2 99%   BMI 24.51 kg/m²   There were no vitals filed for this visit.  Orthostatic Blood Pressures      Flowsheet Row Most Recent Value   Blood Pressure 131/82 filed at 09/08/2024 0836            Intake/Output Summary (Last 24 hours) at 9/8/2024 0928  Last data filed at 9/8/2024 0834  Gross per 24 hour   Intake 629.82 ml   Output 1475 ml   Net -845.18 ml     Physical Exam:  GEN: Aristoteles Toribio Reyes appears well, alert and oriented x 3, pleasant and cooperative  HEENT: pupils equal, round, and reactive to light; extraocular muscles intact  NECK: supple, no carotid bruits  HEART: regular rhythm, normal S1 and S2, no murmurs, clicks, gallops or rubs  LUNGS: clear to auscultation bilaterally; no wheezes, rales, or rhonchi  ABDOMEN: normal bowel sounds, soft, no tenderness, no " distention  EXTREMITIES: peripheral pulses normal; no clubbing, cyanosis, or edema  NEURO: no focal findings  SKIN: normal without suspicious lesions on exposed skin    Medications:    Current Facility-Administered Medications:     amLODIPine (NORVASC) tablet 5 mg, 5 mg, Oral, Daily, SRINIVAS Wilkerson, 5 mg at 09/08/24 0836    aspirin (ECOTRIN LOW STRENGTH) EC tablet 81 mg, 81 mg, Oral, Daily, SRINIVAS Wilkerson, 81 mg at 09/08/24 0836    atorvastatin (LIPITOR) tablet 80 mg, 80 mg, Oral, Daily With Dinner, SRINIVAS Wilkerson, 80 mg at 09/07/24 1825    chlorhexidine (PERIDEX) 0.12 % oral rinse 15 mL, 15 mL, Mouth/Throat, Q12H KALYANI, SRINIVAS Wilkerson, 15 mL at 09/08/24 0836    heparin (porcine) 25,000 units in 0.45% NaCl 250 mL infusion (premix), 3-30 Units/kg/hr (Order-Specific), Intravenous, Titrated, SRINIVAS Wilkerson, Last Rate: 10.8 mL/hr at 09/07/24 2310, 18 Units/kg/hr at 09/07/24 2310    heparin (porcine) injection 2,400 Units, 2,400 Units, Intravenous, Q6H PRN, SRINIVAS Wilkerson    heparin (porcine) injection 4,800 Units, 4,800 Units, Intravenous, Q6H PRN, SRINIVAS Wilkerson    insulin glargine (LANTUS) subcutaneous injection 6 Units 0.06 mL, 6 Units, Subcutaneous, HS, Ceci Tate DO, 6 Units at 09/07/24 2030    insulin lispro (HumALOG/ADMELOG) 100 units/mL subcutaneous injection 1-5 Units, 1-5 Units, Subcutaneous, TID AC, 1 Units at 09/07/24 1825 **AND** Fingerstick Glucose (POCT), , , TID AC, SRINIVAS Wilkerson    insulin lispro (HumALOG/ADMELOG) 100 units/mL subcutaneous injection 3 Units, 3 Units, Subcutaneous, TID With Meals, Ceci Tate DO, 3 Units at 09/08/24 0836    metoprolol succinate (TOPROL-XL) 24 hr tablet 12.5 mg, 12.5 mg, Oral, Daily, SRINIVAS Wilkerson, 12.5 mg at 09/08/24 0836    nitroglycerin (NITROSTAT) SL tablet 0.4 mg, 0.4 mg, Sublingual, Q5 Min PRN, SRINIVAS Wilkerson    Lab Results:      Results from last 7  days   Lab Units 09/08/24  0451 09/07/24  0437 09/06/24  0453   WBC Thousand/uL 6.86 7.71 6.78   HEMOGLOBIN g/dL 11.1* 11.6* 11.5*   HEMATOCRIT % 32.2* 33.4* 33.6*   PLATELETS Thousands/uL 295 286 286     Results from last 7 days   Lab Units 09/02/24  1006   TRIGLYCERIDES mg/dL 185*   HDL mg/dL 49     Results from last 7 days   Lab Units 09/06/24  0453 09/05/24  0838 09/04/24  0225 09/03/24  0352   SODIUM mmol/L 141 137 139 139   POTASSIUM mmol/L 4.0 3.9 3.6 3.7   CHLORIDE mmol/L 111* 106 107 107   CO2 mmol/L 20* 22 23 22   BUN mg/dL 19 21 22 16   CREATININE mg/dL 0.91 1.07 1.10 0.95   CALCIUM mg/dL 8.8 8.7 9.0 9.2   ALK PHOS U/L  --   --   --  62   ALT U/L  --   --   --  14   AST U/L  --   --   --  20     Results from last 7 days   Lab Units 09/08/24  0451 09/07/24  0437 09/06/24  0230 09/02/24  1944 09/02/24  1302 09/02/24  1006   INR   --   --   --   --  1.00 0.97   PTT seconds 78* 70* 66*   < > 29 29    < > = values in this interval not displayed.     Results from last 7 days   Lab Units 09/05/24  0838 09/04/24  1124 09/03/24  0352   MAGNESIUM mg/dL 2.0 1.5* 1.4*       Telemetry: Personally reviewed. No arrhythmias    Echo:  Left Ventricle: Left ventricular cavity size is normal. Wall thickness is normal. The left ventricular ejection fraction is 50%. Systolic function is low normal.  Mild septal and apical hypokinesis. Diastolic function is mildly abnormal, consistent with grade I (abnormal) relaxation.     Cardiac Cath:   Prox LAD to Mid LAD lesion is 80% stenosed.    Prox RCA to Mid RCA lesion is 80% stenosed.    Mid Cx lesion is 40% stenosed.     Multivessel CA; severe multivessel diffuse disease   Mid RCA 80% segmental stenosis   Prox to mid LAD diffuse 80-90% stenosis might have decent landing LIMA target distally  Diagonal 1 has 80% but smaller caliber   Lcx/Om1 has 30% stenosis      LVEDP 12mmHg  Right radial access status post TR band

## 2024-09-08 NOTE — ASSESSMENT & PLAN NOTE
Initially presented to [facility] for chest pain, found to have elevated troponins without EKG changes.  Underwent cardiac catheterization on 9/4 which showed multivessel disease.  Transferred to [place] for CT surgery evaluation/CABG.  CT surgery following, CABG Wednesday 9/11  Continue beta-blocker, aspirin, statin as well as heparin drip.  LVEF on echo 50%.  No significant valvular disease.  Telemetry

## 2024-09-09 LAB
ANION GAP SERPL CALCULATED.3IONS-SCNC: 11 MMOL/L (ref 4–13)
APTT PPP: 56 SECONDS (ref 23–34)
APTT PPP: 61 SECONDS (ref 23–34)
APTT PPP: 86 SECONDS (ref 23–34)
BASOPHILS # BLD AUTO: 0.04 THOUSANDS/ΜL (ref 0–0.1)
BASOPHILS NFR BLD AUTO: 0 % (ref 0–1)
BUN SERPL-MCNC: 22 MG/DL (ref 5–25)
CALCIUM SERPL-MCNC: 9.9 MG/DL (ref 8.4–10.2)
CHLORIDE SERPL-SCNC: 108 MMOL/L (ref 96–108)
CO2 SERPL-SCNC: 21 MMOL/L (ref 21–32)
CREAT SERPL-MCNC: 0.99 MG/DL (ref 0.6–1.3)
EOSINOPHIL # BLD AUTO: 0.26 THOUSAND/ΜL (ref 0–0.61)
EOSINOPHIL NFR BLD AUTO: 3 % (ref 0–6)
ERYTHROCYTE [DISTWIDTH] IN BLOOD BY AUTOMATED COUNT: 12.8 % (ref 11.6–15.1)
GFR SERPL CREATININE-BSD FRML MDRD: 83 ML/MIN/1.73SQ M
GLUCOSE SERPL-MCNC: 146 MG/DL (ref 65–140)
GLUCOSE SERPL-MCNC: 161 MG/DL (ref 65–140)
GLUCOSE SERPL-MCNC: 174 MG/DL (ref 65–140)
GLUCOSE SERPL-MCNC: 175 MG/DL (ref 65–140)
GLUCOSE SERPL-MCNC: 218 MG/DL (ref 65–140)
HCT VFR BLD AUTO: 33.9 % (ref 36.5–49.3)
HGB BLD-MCNC: 11.6 G/DL (ref 12–17)
IMM GRANULOCYTES # BLD AUTO: 0.05 THOUSAND/UL (ref 0–0.2)
IMM GRANULOCYTES NFR BLD AUTO: 1 % (ref 0–2)
LYMPHOCYTES # BLD AUTO: 2.93 THOUSANDS/ΜL (ref 0.6–4.47)
LYMPHOCYTES NFR BLD AUTO: 32 % (ref 14–44)
MCH RBC QN AUTO: 30.5 PG (ref 26.8–34.3)
MCHC RBC AUTO-ENTMCNC: 34.2 G/DL (ref 31.4–37.4)
MCV RBC AUTO: 89 FL (ref 82–98)
MONOCYTES # BLD AUTO: 0.78 THOUSAND/ΜL (ref 0.17–1.22)
MONOCYTES NFR BLD AUTO: 9 % (ref 4–12)
NEUTROPHILS # BLD AUTO: 4.99 THOUSANDS/ΜL (ref 1.85–7.62)
NEUTS SEG NFR BLD AUTO: 55 % (ref 43–75)
NRBC BLD AUTO-RTO: 0 /100 WBCS
PLATELET # BLD AUTO: 307 THOUSANDS/UL (ref 149–390)
PMV BLD AUTO: 11.1 FL (ref 8.9–12.7)
POTASSIUM SERPL-SCNC: 4 MMOL/L (ref 3.5–5.3)
RBC # BLD AUTO: 3.8 MILLION/UL (ref 3.88–5.62)
SODIUM SERPL-SCNC: 140 MMOL/L (ref 135–147)
WBC # BLD AUTO: 9.05 THOUSAND/UL (ref 4.31–10.16)

## 2024-09-09 PROCEDURE — NC001 PR NO CHARGE: Performed by: PHYSICIAN ASSISTANT

## 2024-09-09 PROCEDURE — 82948 REAGENT STRIP/BLOOD GLUCOSE: CPT

## 2024-09-09 PROCEDURE — 85730 THROMBOPLASTIN TIME PARTIAL: CPT | Performed by: INTERNAL MEDICINE

## 2024-09-09 PROCEDURE — 85730 THROMBOPLASTIN TIME PARTIAL: CPT | Performed by: THORACIC SURGERY (CARDIOTHORACIC VASCULAR SURGERY)

## 2024-09-09 PROCEDURE — 80048 BASIC METABOLIC PNL TOTAL CA: CPT | Performed by: NURSE PRACTITIONER

## 2024-09-09 PROCEDURE — 99232 SBSQ HOSP IP/OBS MODERATE 35: CPT | Performed by: STUDENT IN AN ORGANIZED HEALTH CARE EDUCATION/TRAINING PROGRAM

## 2024-09-09 PROCEDURE — 85025 COMPLETE CBC W/AUTO DIFF WBC: CPT | Performed by: NURSE PRACTITIONER

## 2024-09-09 PROCEDURE — 99233 SBSQ HOSP IP/OBS HIGH 50: CPT | Performed by: INTERNAL MEDICINE

## 2024-09-09 RX ADMIN — INSULIN LISPRO 1 UNITS: 100 INJECTION, SOLUTION INTRAVENOUS; SUBCUTANEOUS at 05:58

## 2024-09-09 RX ADMIN — INSULIN LISPRO 3 UNITS: 100 INJECTION, SOLUTION INTRAVENOUS; SUBCUTANEOUS at 17:00

## 2024-09-09 RX ADMIN — CHLORHEXIDINE GLUCONATE 0.12% ORAL RINSE 15 ML: 1.2 LIQUID ORAL at 08:24

## 2024-09-09 RX ADMIN — INSULIN LISPRO 3 UNITS: 100 INJECTION, SOLUTION INTRAVENOUS; SUBCUTANEOUS at 07:18

## 2024-09-09 RX ADMIN — CHLORHEXIDINE GLUCONATE 0.12% ORAL RINSE 15 ML: 1.2 LIQUID ORAL at 22:00

## 2024-09-09 RX ADMIN — INSULIN GLARGINE 6 UNITS: 100 INJECTION, SOLUTION SUBCUTANEOUS at 21:59

## 2024-09-09 RX ADMIN — HEPARIN SODIUM 20 UNITS/KG/HR: 10000 INJECTION, SOLUTION INTRAVENOUS at 17:28

## 2024-09-09 RX ADMIN — ASPIRIN 81 MG: 81 TABLET, COATED ORAL at 08:24

## 2024-09-09 RX ADMIN — INSULIN LISPRO 2 UNITS: 100 INJECTION, SOLUTION INTRAVENOUS; SUBCUTANEOUS at 16:59

## 2024-09-09 RX ADMIN — ATORVASTATIN CALCIUM 80 MG: 80 TABLET, FILM COATED ORAL at 17:00

## 2024-09-09 RX ADMIN — METOPROLOL SUCCINATE 12.5 MG: 25 TABLET, EXTENDED RELEASE ORAL at 08:24

## 2024-09-09 RX ADMIN — INSULIN LISPRO 3 UNITS: 100 INJECTION, SOLUTION INTRAVENOUS; SUBCUTANEOUS at 11:51

## 2024-09-09 RX ADMIN — HEPARIN SODIUM 2400 UNITS: 1000 INJECTION INTRAVENOUS; SUBCUTANEOUS at 05:55

## 2024-09-09 RX ADMIN — INSULIN LISPRO 1 UNITS: 100 INJECTION, SOLUTION INTRAVENOUS; SUBCUTANEOUS at 11:50

## 2024-09-09 RX ADMIN — AMLODIPINE BESYLATE 5 MG: 5 TABLET ORAL at 08:24

## 2024-09-09 NOTE — PLAN OF CARE
Problem: PAIN - ADULT  Goal: Verbalizes/displays adequate comfort level or baseline comfort level  Description: Interventions:  - Encourage patient to monitor pain and request assistance  - Assess pain using appropriate pain scale  - Administer analgesics based on type and severity of pain and evaluate response  - Implement non-pharmacological measures as appropriate and evaluate response  - Consider cultural and social influences on pain and pain management  - Notify physician/advanced practitioner if interventions unsuccessful or patient reports new pain  Outcome: Progressing     Problem: INFECTION - ADULT  Goal: Absence or prevention of progression during hospitalization  Description: INTERVENTIONS:  - Assess and monitor for signs and symptoms of infection  - Monitor lab/diagnostic results  - Monitor all insertion sites, i.e. indwelling lines, tubes, and drains  - Monitor endotracheal if appropriate and nasal secretions for changes in amount and color  - [place] appropriate cooling/warming therapies per order  - Administer medications as ordered  - Instruct and encourage patient and family to use good hand hygiene technique  - Identify and instruct in appropriate isolation precautions for identified infection/condition  Outcome: Progressing  Goal: Absence of fever/infection during neutropenic period  Description: INTERVENTIONS:  - Monitor WBC    Outcome: Progressing     Problem: SAFETY ADULT  Goal: Patient will remain free of falls  Description: INTERVENTIONS:  - Educate patient/family on patient safety including physical limitations  - Instruct patient to call for assistance with activity   - Consult OT/PT to assist with strengthening/mobility   - Keep Call bell within reach  - Keep bed low and locked with side rails adjusted as appropriate  - Keep care items and personal belongings within reach  - Initiate and maintain comfort rounds  - Make Fall Risk Sign visible to staff  - Apply yellow socks and bracelet  for high fall risk patients  - Consider moving patient to room near nurses station  Outcome: Progressing  Goal: Maintain or return to baseline ADL function  Description: INTERVENTIONS:  -  Assess patient's ability to carry out ADLs; assess patient's baseline for ADL function and identify physical deficits which impact ability to perform ADLs (bathing, care of mouth/teeth, toileting, grooming, dressing, etc.)  - Assess/evaluate cause of self-care deficits   - Assess range of motion  - Assess patient's mobility; develop plan if impaired  - Assess patient's need for assistive devices and provide as appropriate  - Encourage maximum independence but intervene and supervise when necessary  - Involve family in performance of ADLs  - Assess for home care needs following discharge   - Consider OT consult to assist with ADL evaluation and planning for discharge  - Provide patient education as appropriate  Outcome: Progressing  Goal: Maintains/Returns to pre admission functional level  Description: INTERVENTIONS:  - Perform AM-PAC 6 Click Basic Mobility/ Daily Activity assessment daily.  - Set and communicate daily mobility goal to care team and patient/family/caregiver.   - Collaborate with rehabilitation services on mobility goals if consulted  - Perform Range of Motion 3 times a day.  - Reposition patient every 3 hours.  - Dangle patient 3 times a day  - Stand patient 3 times a day  - Ambulate patient 3 times a day  - Out of bed to chair 3 times a day   - Out of bed for meals 3 times a day  - Out of bed for toileting  - Record patient progress and toleration of activity level   Outcome: Progressing     Problem: DISCHARGE PLANNING  Goal: Discharge to home or other facility with appropriate resources  Description: INTERVENTIONS:  - Identify barriers to discharge w/patient and caregiver  - Arrange for needed discharge resources and transportation as appropriate  - Identify discharge learning needs (meds, wound care, etc.)  -  Arrange for interpretive services to assist at discharge as needed  - Refer to Case Management Department for coordinating discharge planning if the patient needs post-hospital services based on physician/advanced practitioner order or complex needs related to functional status, cognitive ability, or social support system  Outcome: Progressing     Problem: Knowledge Deficit  Goal: Patient/family/caregiver demonstrates understanding of disease process, treatment plan, medications, and discharge instructions  Description: Complete learning assessment and assess knowledge base.  Interventions:  - Provide teaching at level of understanding  - Provide teaching via preferred learning methods  Outcome: Progressing     Problem: NEUROSENSORY - ADULT  Goal: Achieves stable or improved neurological status  Description: INTERVENTIONS  - Monitor and report changes in neurological status  - Monitor vital signs such as temperature, blood pressure, glucose, and any other labs ordered   - Initiate measures to prevent increased intracranial pressure  - Monitor for seizure activity and implement precautions if appropriate      Outcome: Progressing  Goal: Remains free of injury related to seizures activity  Description: INTERVENTIONS  - Maintain airway, patient safety  and administer oxygen as ordered  - Monitor patient for seizure activity, document and report duration and description of seizure to physician/advanced practitioner  - If seizure occurs,  ensure patient safety during seizure  - Reorient patient post seizure  - Seizure pads on all 4 side rails  - Instruct patient/family to notify RN of any seizure activity including if an aura is experienced  - Instruct patient/family to call for assistance with activity based on nursing assessment  - Administer anti-seizure medications if ordered    Outcome: Progressing  Goal: Achieves maximal functionality and self care  Description: INTERVENTIONS  - Monitor swallowing and airway  patency with patient fatigue and changes in neurological status  - Encourage and assist patient to increase activity and self care.   - Encourage visually impaired, hearing impaired and aphasic patients to use assistive/communication devices  Outcome: Progressing     Problem: CARDIOVASCULAR - ADULT  Goal: Maintains optimal cardiac output and hemodynamic stability  Description: INTERVENTIONS:  - Monitor I/O, vital signs and rhythm  - Monitor for S/S and trends of decreased cardiac output  - Administer and titrate ordered vasoactive medications to optimize hemodynamic stability  - Assess quality of pulses, skin color and temperature  - Assess for signs of decreased coronary artery perfusion  - Instruct patient to report change in severity of symptoms  Outcome: Progressing  Goal: Absence of cardiac dysrhythmias or at baseline rhythm  Description: INTERVENTIONS:  - Continuous cardiac monitoring, vital signs, obtain 12 lead EKG if ordered  - Administer antiarrhythmic and heart rate control medications as ordered  - Monitor electrolytes and administer replacement therapy as ordered  Outcome: Progressing     Problem: RESPIRATORY - ADULT  Goal: Achieves optimal ventilation and oxygenation  Description: INTERVENTIONS:  - Assess for changes in respiratory status  - Assess for changes in mentation and behavior  - Position to facilitate oxygenation and minimize respiratory effort  - Oxygen administered by appropriate delivery if ordered  - Initiate smoking cessation education as indicated  - Encourage broncho-pulmonary hygiene including cough, deep breathe, Incentive Spirometry  - Assess the need for suctioning and aspirate as needed  - Assess and instruct to report SOB or any respiratory difficulty  - Respiratory Therapy support as indicated  Outcome: Progressing     Problem: GASTROINTESTINAL - ADULT  Goal: Minimal or absence of nausea and/or vomiting  Description: INTERVENTIONS:  - Administer IV fluids if ordered to ensure  adequate hydration  - Maintain NPO status until nausea and vomiting are resolved  - Nasogastric tube if ordered  - Administer ordered antiemetic medications as needed  - Provide nonpharmacologic comfort measures as appropriate  - Advance diet as tolerated, if ordered  - Consider nutrition services referral to assist patient with adequate nutrition and appropriate food choices  Outcome: Progressing  Goal: Maintains or returns to baseline bowel function  Description: INTERVENTIONS:  - Assess bowel function  - Encourage oral fluids to ensure adequate hydration  - Administer IV fluids if ordered to ensure adequate hydration  - Administer ordered medications as needed  - Encourage mobilization and activity  - Consider nutritional services referral to assist patient with adequate nutrition and appropriate food choices  Outcome: Progressing  Goal: Maintains adequate nutritional intake  Description: INTERVENTIONS:  - Monitor percentage of each meal consumed  - Identify factors contributing to decreased intake, treat as appropriate  - Assist with meals as needed  - Monitor I&O, weight, and lab values if indicated  - Obtain nutrition services referral as needed  Outcome: Progressing  Goal: Establish and maintain optimal ostomy function  Description: INTERVENTIONS:  - Assess bowel function  - Encourage oral fluids to ensure adequate hydration  - Administer IV fluids if ordered to ensure adequate hydration   - Administer ordered medications as needed  - Encourage mobilization and activity  - Nutrition services referral to assist patient with appropriate food choices  - Assess stoma site  - Consider wound care consult   Outcome: Progressing  Goal: Oral mucous membranes remain intact  Description: INTERVENTIONS  - Assess oral mucosa and hygiene practices  - Implement preventative oral hygiene regimen  - Implement oral medicated treatments as ordered  - Initiate Nutrition services referral as needed  Outcome: Progressing      Problem: GENITOURINARY - ADULT  Goal: Maintains or returns to baseline urinary function  Description: INTERVENTIONS:  - Assess urinary function  - Encourage oral fluids to ensure adequate hydration if ordered  - Administer IV fluids as ordered to ensure adequate hydration  - Administer ordered medications as needed  - Offer frequent toileting  - Follow urinary retention protocol if ordered  Outcome: Progressing  Goal: Absence of urinary retention  Description: INTERVENTIONS:  - Assess patient’s ability to void and empty bladder  - Monitor I/O  - Bladder scan as needed  - Discuss with physician/AP medications to alleviate retention as needed  - Discuss catheterization for long term situations as appropriate  Outcome: Progressing  Goal: Urinary catheter remains patent  Description: INTERVENTIONS:  - Assess patency of urinary catheter  - If patient has a chronic mcclendon, consider changing catheter if non-functioning  - Follow guidelines for intermittent irrigation of non-functioning urinary catheter  Outcome: Progressing     Problem: METABOLIC, FLUID AND ELECTROLYTES - ADULT  Goal: Electrolytes maintained within normal limits  Description: INTERVENTIONS:  - Monitor labs and assess patient for signs and symptoms of electrolyte imbalances  - Administer electrolyte replacement as ordered  - Monitor response to electrolyte replacements, including repeat lab results as appropriate  - Instruct patient on fluid and nutrition as appropriate  Outcome: Progressing  Goal: Fluid balance maintained  Description: INTERVENTIONS:  - Monitor labs   - Monitor I/O and WT  - Instruct patient on fluid and nutrition as appropriate  - Assess for signs & symptoms of volume excess or deficit  Outcome: Progressing  Goal: Glucose maintained within target range  Description: INTERVENTIONS:  - Monitor Blood Glucose as ordered  - Assess for signs and symptoms of hyperglycemia and hypoglycemia  - Administer ordered medications to maintain glucose  within target range  - Assess nutritional intake and initiate nutrition service referral as needed  Outcome: Progressing     Problem: HEMATOLOGIC - ADULT  Goal: Maintains hematologic stability  Description: INTERVENTIONS  - Assess for signs and symptoms of bleeding or hemorrhage  - Monitor labs  - Administer supportive blood products/factors as ordered and appropriate  Outcome: Progressing     Problem: MUSCULOSKELETAL - ADULT  Goal: Maintain or return mobility to safest level of function  Description: INTERVENTIONS:  - Assess patient's ability to carry out ADLs; assess patient's baseline for ADL function and identify physical deficits which impact ability to perform ADLs (bathing, care of mouth/teeth, toileting, grooming, dressing, etc.)  - Assess/evaluate cause of self-care deficits   - Assess range of motion  - Assess patient's mobility  - Assess patient's need for assistive devices and provide as appropriate  - Encourage maximum independence but intervene and supervise when necessary  - Involve family in performance of ADLs  - Assess for home care needs following discharge   - Consider OT consult to assist with ADL evaluation and planning for discharge  - Provide patient education as appropriate  Outcome: Progressing  Goal: Maintain proper alignment of affected body part  Description: INTERVENTIONS:  - Support, maintain and protect limb and body alignment  - Provide patient/ family with appropriate education  Outcome: Progressing     Problem: Nutrition/Hydration-ADULT  Goal: Nutrient/Hydration intake appropriate for improving, restoring or maintaining nutritional needs  Description: Monitor and assess patient's nutrition/hydration status for malnutrition. Collaborate with interdisciplinary team and initiate plan and interventions as ordered.  Monitor patient's weight and dietary intake as ordered or per policy. Utilize nutrition screening tool and intervene as necessary. Determine patient's food preferences and  provide high-protein, high-caloric foods as appropriate.     INTERVENTIONS:  - Monitor oral intake, urinary output, labs, and treatment plans  - Assess nutrition and hydration status and recommend course of action  - Evaluate amount of meals eaten  - Assist patient with eating if necessary   - Allow adequate time for meals  - Recommend/ encourage appropriate diets, oral nutritional supplements, and vitamin/mineral supplements  - Order, calculate, and assess calorie counts as needed  - Recommend, monitor, and adjust tube feedings and TPN/PPN based on assessed needs  - Assess need for intravenous fluids  - Provide specific nutrition/hydration education as appropriate  - Include patient/family/caregiver in decisions related to nutrition  Outcome: Progressing

## 2024-09-09 NOTE — ASSESSMENT & PLAN NOTE
Initially presented to [facility] for chest pain, found to have elevated troponins without EKG changes.  Underwent cardiac catheterization on 9/4 which showed multivessel disease.  Transferred to [facility] for CT surgery evaluation/CABG.  CT surgery following, CABG Wednesday 9/11  Continue beta-blocker, aspirin, statin as well as heparin drip.  LVEF on echo 50%.  No significant valvular disease.  Telemetry

## 2024-09-09 NOTE — UTILIZATION REVIEW
Continued Stay Review    Date: 9-8-24                           Current Patient Class: Inpatient Current Level of Care: med surg     HPI:58 y.o. male initially admitted on 9-5-24  Patient with multivessel coronary artery disease found on 9-4 cardiac cath.    LVEF on echo 50%.     PMHX : stroke.     Assessment/Plan:   Monitor on telemetry.  Continue heparin gtt in preparation for CABG on 9-11.    Vital Signs (last 3 days)       Date/Time Temp Pulse Resp BP MAP (mmHg) SpO2 O2 Device Janie Coma Scale Score Pain    09/09/24 14:41:50 98 °F (36.7 °C) 81 -- 115/78 90 97 % -- -- --    09/09/24 11:06:25 98.2 °F (36.8 °C) 78 -- 125/80 95 97 % -- -- --    09/09/24 0900 -- -- -- -- -- -- -- -- No Pain    09/09/24 08:16:50 97.8 °F (36.6 °C) 77 -- 124/75 91 98 % -- -- --    09/09/24 0800 -- -- -- -- -- -- None (Room air) 15 No Pain    09/09/24 03:02:50 98.1 °F (36.7 °C) 73 15 150/81 104 96 % None (Room air) -- No Pain    09/08/24 22:25:08 98.2 °F (36.8 °C) 79 16 132/83 99 95 % None (Room air) -- No Pain    09/08/24 2000 -- -- -- -- -- -- None (Room air) 15 No Pain    09/08/24 16:15:01 98.4 °F (36.9 °C) 66 -- 142/79 100 97 % -- -- --    09/08/24 16:14:51 98.4 °F (36.9 °C) 82 -- 142/79 100 97 % -- -- --    09/08/24 11:40:02 98.1 °F (36.7 °C) 76 -- 143/79 100 96 % -- -- --    09/08/24 0836 -- 77 -- 131/82 98 99 % -- -- --    09/08/24 0800 -- -- -- -- -- 98 % None (Room air) 15 No Pain    09/08/24 07:47:18 98.1 °F (36.7 °C) 25 -- 149/82 104 95 % -- -- --    09/08/24 02:43:28 97.7 °F (36.5 °C) 79 15 120/72 88 95 % -- -- --    09/07/24 23:08:29 98.8 °F (37.1 °C) 74 -- 124/79 94 97 % -- -- --    09/07/24 2038 -- -- -- -- -- -- None (Room air) 15 No Pain    09/07/24 18:20:31 98.1 °F (36.7 °C) 79 -- 124/78 93 95 % -- -- --    09/07/24 15:12:33 98.1 °F (36.7 °C) 77 -- 122/75 91 97 % -- -- --    09/07/24 10:57:05 98.2 °F (36.8 °C) 76 -- 125/77 93 98 % -- -- --    09/07/24 0800 -- -- -- -- -- -- None (Room air) 15 No Pain    09/07/24  0647 98 °F (36.7 °C) 71 -- 128/76 93 98 % -- -- --    09/07/24 02:45:26 97.9 °F (36.6 °C) 79 15 138/86 103 98 % -- -- --    09/06/24 22:28:09 98.3 °F (36.8 °C) 84 16 149/78 102 100 % -- -- --    09/06/24 2115 -- -- -- -- -- 99 % None (Room air) 15 No Pain    09/06/24 19:12:39 98.1 °F (36.7 °C) 81 16 134/83 100 99 % -- -- --    09/06/24 18:06:05 97.9 °F (36.6 °C) 84 17 121/74 90 95 % -- -- --    09/06/24 1206 -- -- -- -- -- -- -- 15 --    09/06/24 10:40:53 98.4 °F (36.9 °C) 79 18 134/74 94 100 % -- -- --    09/06/24 0800 -- -- -- -- -- -- -- 15 No Pain    09/06/24 07:12:17 97.8 °F (36.6 °C) 72 16 101/57 72 98 % -- -- --    09/06/24 02:56:56 98.3 °F (36.8 °C) 75 16 106/68 81 99 % -- -- --          Weight (last 2 days)       Date/Time Weight    09/09/24 0852 60.8 (134)              Pertinent Labs/Diagnostic Results:   Radiology:  MRI brain w wo contrast    (Results Pending)     Cardiology:  ECG 12 lead   Final(09/07 1719)   Normal sinus rhythm   Nonspecific T wave abnormality   Abnormal ECG   When compared with ECG of 02-SEP-2024 14:44,   T wave inversion now evident in Anterior leads   Confirmed by Maximino Waller ([id number]) on 9/7/2024 5:19:12 PM        GI:  No orders to display           Results from last 7 days   Lab Units 09/09/24  0441 09/08/24  0451 09/07/24  0437 09/06/24  0453 09/05/24  0838 09/05/24  0838   WBC Thousand/uL 9.05 6.86 7.71 6.78  --  7.09   HEMOGLOBIN g/dL 11.6* 11.1* 11.6* 11.5*  --  12.2   HEMATOCRIT % 33.9* 32.2* 33.4* 33.6*  --  36.6   PLATELETS Thousands/uL 307 295 286 286  --  296   TOTAL NEUT ABS Thousands/µL 4.99 3.36 4.03 3.47   < >  --     < > = values in this interval not displayed.         Results from last 7 days   Lab Units 09/09/24  0441 09/06/24  0453 09/05/24  0838 09/04/24  1124 09/04/24  0225 09/03/24  0352   SODIUM mmol/L 140 141 137  --  139 139   POTASSIUM mmol/L 4.0 4.0 3.9  --  3.6 3.7   CHLORIDE mmol/L 108 111* 106  --  107 107   CO2 mmol/L 21 20* 22  --  23 22   ANION GAP  mmol/L 11 10 9  --  9 10   BUN mg/dL 22 19 21  --  22 16   CREATININE mg/dL 0.99 0.91 1.07  --  1.10 0.95   EGFR ml/min/1.73sq m 83 92 76  --  73 87   CALCIUM mg/dL 9.9 8.8 8.7  --  9.0 9.2   MAGNESIUM mg/dL  --   --  2.0 1.5*  --  1.4*   PHOSPHORUS mg/dL  --   --   --   --   --  2.9     Results from last 7 days   Lab Units 09/03/24  0352   AST U/L 20   ALT U/L 14   ALK PHOS U/L 62   TOTAL PROTEIN g/dL 6.5   ALBUMIN g/dL 4.1   TOTAL BILIRUBIN mg/dL 0.58     Results from last 7 days   Lab Units 09/09/24  1126 09/09/24  0555 09/08/24  2127 09/08/24  1613 09/08/24  1056 09/08/24  0532 09/07/24  2025 09/07/24  1606 09/07/24  1055 09/07/24  0534 09/06/24  2036 09/06/24  1603   POC GLUCOSE mg/dl 174* 175* 181* 305* 204* 134 221* 194* 232* 151* 236* 156*     Results from last 7 days   Lab Units 09/09/24  0441 09/06/24  0453 09/05/24  0838 09/04/24  0225 09/03/24  0352   GLUCOSE RANDOM mg/dL 161* 186* 332* 197* 216*     Results from last 7 days   Lab Units 09/09/24  1218 09/09/24  0441 09/08/24  0451   PTT seconds 86* 56* 78*       Medications:   Scheduled Medications:  amLODIPine, 5 mg, Oral, Daily  aspirin, 81 mg, Oral, Daily  atorvastatin, 80 mg, Oral, Daily With Dinner  chlorhexidine, 15 mL, Mouth/Throat, Q12H KALYANI  insulin glargine, 6 Units, Subcutaneous, HS  insulin lispro, 1-5 Units, Subcutaneous, TID AC  insulin lispro, 3 Units, Subcutaneous, TID With Meals  metoprolol succinate, 12.5 mg, Oral, Daily      Continuous IV Infusions:  heparin (porcine), 3-30 Units/kg/hr (Order-Specific), Intravenous, Titrated      PRN Meds:  heparin (porcine), 2,400 Units, Intravenous, Q6H PRN  heparin (porcine), 4,800 Units, Intravenous, Q6H PRN  nitroglycerin, 0.4 mg, Sublingual, Q5 Min PRN        Discharge Plan: to be determined     Network Utilization Review Department  ATTENTION: Please call with any questions or concerns to [phone] and carefully listen to the prompts so that you are directed to the right person. All voicemails  are confidential.   For Discharge needs, contact Care Management DC Support Team at [phone] opt. 2  Send all requests for admission clinical reviews, approved or denied determinations and any other requests to dedicated fax number below belonging to the campus where the patient is receiving treatment. List of dedicated fax numbers for the Facilities:  FACILITY NAME UR FAX NUMBER   ADMISSION DENIALS (Administrative/Medical Necessity) [phone]   DISCHARGE SUPPORT TEAM (NETWORK) [phone]   PARENT CHILD HEALTH (Maternity/NICU/Pediatrics) [phone]   [facility] [phone]   [facility] [phone]   [facility] [phone]   [facility] [phone]   [facility] [phone]   [facility] [phone]   [facility] [phone]   [facility] [phone]   [facility] [phone]   [facility] [phone]   [facility] [phone]   [facility] [phone]

## 2024-09-09 NOTE — ASSESSMENT & PLAN NOTE
Also with strokelike symptoms on presentation to [facility] with left lower extremity weakness which improved on repeat exam.  Was evaluated by neurology. Now resolved.   CT head showed chronic left cerebellar infarct, no evidence of acute stroke or hemorrhage.  Subsequent MRI brain without any acute findings, Susceptibility focus in the right periatrial white matter corresponds to the calcification on head CT. This is nonspecific and may be due to prior granulomatous process or cavernous malformation.   Continue aspirin, statin.  Neurology signed off.  CT surgery requested neurosurgery evaluation regarding small area of calcification noted on imaging.  Per neurosurgery, this appears to be a calcification however cavernous malformation cannot be ruled out.  Recommending MRI brain with contrast at some point in the future, can be done as outpatient.  Does not need to be done preop.

## 2024-09-09 NOTE — PROGRESS NOTES
General Cardiology   Progress Note -  Team One   Aristoteles Toribio Reyes 58 y.o. male MRN: [id number]    Unit/Bed#: [facility]-321-01 Encounter: [id number]    Assessment & Plan  NSTEMI (non-ST elevated myocardial infarction) (HCC)  Presented to [facility] with CP with elevated troponin  Found to have multivessel CAD on cardiac catheterization therefore transferred for CABG evaluation  CAD, multiple vessel  With NSTEMI --> MV disease on cardiac cath. Transferred here for CABG eval.   LVEF 50% on echo, no significant valvular disease.  CT surgery following  Remains chest pain-free on aspirin, beta-blocker, statin and heparin infusion  HTN (hypertension)  Average BP 39/79 on metoprolol succinate 12.5mg daily and amlodipine 5 mg daily  Lisinopril held perioperatively    Type 2 diabetes mellitus, without long-term current use of insulin (HCC)  Hemoglobin A1c 8.4.  Management per primary team.  Stroke-like symptoms old CVA on CT  Evaluated by neurology and cleared for AC/AP  Neurology signed off at [facility]       Plan/Recommendations:  Scheduled for CABG on 9/11  Continue aspirin, beta-blocker, statin and heparin infusion  Continue amlodipine  Continue to monitor on telemetry    _____________________________________________________________________    Subjective    Patient seen and examined.  No acute events overnight.    Review of Systems   Constitutional: Negative. Negative for chills.   Cardiovascular:  Negative for chest pain, dyspnea on exertion, leg swelling, near-syncope, orthopnea, palpitations, paroxysmal nocturnal dyspnea and syncope.   Respiratory: Negative.  Negative for cough, shortness of breath and wheezing.    Endocrine: Negative.    Hematologic/Lymphatic: Negative.    Skin: Negative.    Musculoskeletal: Negative.    Gastrointestinal: Negative.  Negative for diarrhea, nausea and vomiting.   Neurological:  Negative for dizziness, light-headedness and weakness.   Psychiatric/Behavioral: Negative.  Negative for  "altered mental status.    All other systems reviewed and are negative.      Objective:   Vitals: Blood pressure 124/75, pulse 77, temperature 97.8 °F (36.6 °C), resp. rate 15, height 5' 2\" (1.575 m), weight 60.8 kg (134 lb), SpO2 98%.,     Wt Readings from Last 3 Encounters:   24 60.8 kg (134 lb)   24 60.8 kg (134 lb)   24 60.8 kg (134 lb)        Lab Results   Component Value Date    CREATININE 0.99 2024    CREATININE 0.91 2024    CREATININE 1.07 2024         Body mass index is 24.51 kg/m².,     Systolic (24hrs), Av , Min:124 , Max:150     Diastolic (24hrs), Av, Min:75, Max:83          Intake/Output Summary (Last 24 hours) at 2024 1044  Last data filed at 2024 0816  Gross per 24 hour   Intake 692.78 ml   Output 1525 ml   Net -832.22 ml     Weight (last 2 days)       Date/Time Weight    24 0852 60.8 (134)              Telemetry Review: No significant arrhythmias seen on telemetry review.       Physical Exam  Vitals and nursing note reviewed.   Constitutional:       General: He is not in acute distress.     Appearance: He is well-developed.      Comments: On RA in NAD   HENT:      Head: Normocephalic and atraumatic.   Neck:      Vascular: No JVD.   Cardiovascular:      Rate and Rhythm: Normal rate and regular rhythm.      Heart sounds: Normal heart sounds. No murmur heard.     No friction rub. No gallop.   Pulmonary:      Effort: Pulmonary effort is normal. No respiratory distress.      Breath sounds: Normal breath sounds. No wheezing or rales.   Chest:      Chest wall: No tenderness.   Abdominal:      General: Bowel sounds are normal. There is no distension.      Palpations: Abdomen is soft.      Tenderness: There is no abdominal tenderness.   Musculoskeletal:         General: No tenderness. Normal range of motion.      Cervical back: Normal range of motion and neck supple.      Right lower leg: No edema.      Left lower leg: No edema.   Skin:     General: " "Skin is warm and dry.      Coloration: Skin is not pale.      Findings: No erythema.   Neurological:      Mental Status: He is alert and oriented to person, place, and time.   Psychiatric:         Mood and Affect: Mood normal.         Behavior: Behavior normal.         Thought Content: Thought content normal.         Judgment: Judgment normal.         LABORATORY RESULTS      CBC with diff:   Results from last 7 days   Lab Units 09/09/24 0441 09/08/24  0451 09/07/24  0437 09/06/24 0453 09/05/24 0838 09/04/24 0225 09/03/24  0352   WBC Thousand/uL 9.05 6.86 7.71 6.78 7.09 8.84 10.03   HEMOGLOBIN g/dL 11.6* 11.1* 11.6* 11.5* 12.2 12.3 12.3   HEMATOCRIT % 33.9* 32.2* 33.4* 33.6* 36.6 35.9* 35.4*   MCV fL 89 90 89 90 90 87 87   PLATELETS Thousands/uL 307 295 286 286 296 300 289   RBC Million/uL 3.80* 3.58* 3.75* 3.74* 4.05 4.11 4.05   MCH pg 30.5 31.0 30.9 30.7 30.1 29.9 30.4   MCHC g/dL 34.2 34.5 34.7 34.2 33.3 34.3 34.7   RDW % 12.8 12.7 12.5 12.5 12.3 12.3 12.3   MPV fL 11.1 11.8 11.0 10.7 10.4 10.7 10.7   NRBC AUTO /100 WBCs 0 0 0 0  --   --   --        CMP:  Results from last 7 days   Lab Units 09/09/24 0441 09/06/24 0453 09/05/24 0838 09/04/24 0225 09/03/24  0352   POTASSIUM mmol/L 4.0 4.0 3.9 3.6 3.7   CHLORIDE mmol/L 108 111* 106 107 107   CO2 mmol/L 21 20* 22 23 22   BUN mg/dL 22 19 21 22 16   CREATININE mg/dL 0.99 0.91 1.07 1.10 0.95   CALCIUM mg/dL 9.9 8.8 8.7 9.0 9.2   AST U/L  --   --   --   --  20   ALT U/L  --   --   --   --  14   ALK PHOS U/L  --   --   --   --  62   EGFR ml/min/1.73sq m 83 92 76 73 87       BMP:  Results from last 7 days   Lab Units 09/09/24  0441 09/06/24  0453 09/05/24  0838 09/04/24  0225 09/03/24  0352   POTASSIUM mmol/L 4.0 4.0 3.9 3.6 3.7   CHLORIDE mmol/L 108 111* 106 107 107   CO2 mmol/L 21 20* 22 23 22   BUN mg/dL 22 19 21 22 16   CREATININE mg/dL 0.99 0.91 1.07 1.10 0.95   CALCIUM mg/dL 9.9 8.8 8.7 9.0 9.2       No results found for: \"NTBNP\"          Results from last 7 " "days   Lab Units 09/05/24  0838 09/04/24  1124 09/03/24  0352   MAGNESIUM mg/dL 2.0 1.5* 1.4*                     Results from last 7 days   Lab Units 09/02/24  1302   INR  1.00       Lipid Profile:   No results found for: \"CHOL\"  Lab Results   Component Value Date    HDL 49 09/02/2024     Lab Results   Component Value Date    LDLCALC 76 09/02/2024     Lab Results   Component Value Date    TRIG 185 (H) 09/02/2024         Meds/Allergies   all current active meds have been reviewed, current meds:   Current Facility-Administered Medications   Medication Dose Route Frequency    amLODIPine (NORVASC) tablet 5 mg  5 mg Oral Daily    aspirin (ECOTRIN LOW STRENGTH) EC tablet 81 mg  81 mg Oral Daily    atorvastatin (LIPITOR) tablet 80 mg  80 mg Oral Daily With Dinner    chlorhexidine (PERIDEX) 0.12 % oral rinse 15 mL  15 mL Mouth/Throat Q12H KALYANI    heparin (porcine) 25,000 units in 0.45% NaCl 250 mL infusion (premix)  3-30 Units/kg/hr (Order-Specific) Intravenous Titrated    heparin (porcine) injection 2,400 Units  2,400 Units Intravenous Q6H PRN    heparin (porcine) injection 4,800 Units  4,800 Units Intravenous Q6H PRN    insulin glargine (LANTUS) subcutaneous injection 6 Units 0.06 mL  6 Units Subcutaneous HS    insulin lispro (HumALOG/ADMELOG) 100 units/mL subcutaneous injection 1-5 Units  1-5 Units Subcutaneous TID AC    insulin lispro (HumALOG/ADMELOG) 100 units/mL subcutaneous injection 3 Units  3 Units Subcutaneous TID With Meals    metoprolol succinate (TOPROL-XL) 24 hr tablet 12.5 mg  12.5 mg Oral Daily    nitroglycerin (NITROSTAT) SL tablet 0.4 mg  0.4 mg Sublingual Q5 Min PRN   , and PTA meds:   Prior to Admission Medications   Prescriptions Last Dose Informant Patient Reported? Taking?   lisinopril (ZESTRIL) 10 mg tablet   Yes No   Sig: Take 10 mg by mouth daily   metFORMIN (GLUCOPHAGE) 1000 MG tablet   Yes No   Sig: Take 1,000 mg by mouth 2 (two) times a day with meals      Facility-Administered Medications: " None       Medications Prior to Admission:     lisinopril (ZESTRIL) 10 mg tablet    metFORMIN (GLUCOPHAGE) 1000 MG tablet    heparin (porcine), 3-30 Units/kg/hr (Order-Specific), Last Rate: 20 Units/kg/hr (09/09/24 0550)        Counseling / Coordination of Care  Total floor / unit time spent today 20 minutes.  Greater than 50% of total time was spent with the patient and / or family counseling and / or coordination of care.      ** Please Note: Dragon 360 Dictation voice to text software may have been used in the creation of this document. **

## 2024-09-09 NOTE — ASSESSMENT & PLAN NOTE
Average BP 39/79 on metoprolol succinate 12.5mg daily and amlodipine 5 mg daily  Lisinopril held perioperatively

## 2024-09-09 NOTE — ASSESSMENT & PLAN NOTE
Lab Results   Component Value Date    HGBA1C 8.4 (H) 09/02/2024       Recent Labs     09/08/24  1056 09/08/24  1613 09/08/24 2127 09/09/24  0555   POCGLU 204* 305* 181* 175*       Holding home metformin  Continue Lantus 6 units nightly and Humalog 3 units 3 times daily with meals plus sliding scale  Diabetic diet  Monitor Accu-Cheks and adjust regimen as needed

## 2024-09-09 NOTE — CASE MANAGEMENT
Case Management Discharge Planning Note    Patient name Aristoteles Toribio Reyes  Location [facility]01 MRN [id number]  : 1965 Date 2024       Current Admission Date: 2024  Current Admission Diagnosis:CAD, multiple vessel   Patient Active Problem List    Diagnosis Date Noted Date Diagnosed    CAD, multiple vessel 2024     Stroke-like symptoms old CVA on CT 2024     HTN (hypertension) 2024     NSTEMI (non-ST elevated myocardial infarction) (HCC) 2024     CVA (cerebral vascular accident) (HCC) 2024     Type 2 diabetes mellitus, without long-term current use of insulin ([facility]) 2024       LOS (days): 4  Geometric Mean LOS (GMLOS) (days):   Days to GMLOS:     OBJECTIVE:  Risk of Unplanned Readmission Score: 10.05         Current admission status: Inpatient   Preferred Pharmacy:   [facility]  [place] [address] [place] [address] [address] [facility] [id number]  Phone: [phone] Fax: [phone]    Primary Care Provider: No primary care provider on file.    Primary Insurance: AET[employer]  Secondary Insurance:     DISCHARGE DETAILS:           Additional Comments: Pt discussed during care coordination rounds. For CABG surgery tbd. CM to follow up post op.

## 2024-09-09 NOTE — PROGRESS NOTES
Progress Note - Cardiothoracic Surgery   Aristoteles Toribio Reyes 58 y.o. male MRN: [id number]  Unit/Bed#: [facility]-321-01 Encounter: [id number]      24 Hour Events: Labs and vitals stable. On IV heparin. No events. Denies CP/SOB. Son translated. Questions answered.     Medications:   Scheduled Meds:  Current Facility-Administered Medications   Medication Dose Route Frequency Provider Last Rate    amLODIPine  5 mg Oral Daily SRINIVAS Wilkerson      aspirin  81 mg Oral Daily SRINIVAS Wilkerson      atorvastatin  80 mg Oral Daily With Dinner SRINIVAS Wilkerson      chlorhexidine  15 mL Mouth/Throat Q12H KALYANI SRINIVAS Wilkerson      heparin (porcine)  3-30 Units/kg/hr (Order-Specific) Intravenous Titrated SRINIVAS Wilkerson 20 Units/kg/hr (09/09/24 0551)    heparin (porcine)  2,400 Units Intravenous Q6H PRN SRINIVAS Wilkerson      heparin (porcine)  4,800 Units Intravenous Q6H PRN SRINIVAS Wilkerson      insulin glargine  6 Units Subcutaneous HS Ceci Tate DO      insulin lispro  1-5 Units Subcutaneous TID AC SRINIVAS Wilkerson      insulin lispro  3 Units Subcutaneous TID With Meals Ceci Tate DO      metoprolol succinate  12.5 mg Oral Daily SRINIVAS Wilkerson      nitroglycerin  0.4 mg Sublingual Q5 Min PRN SRINIVAS Wilkerson       Continuous Infusions:heparin (porcine), 3-30 Units/kg/hr (Order-Specific), Last Rate: 20 Units/kg/hr (09/09/24 0551)        Results:   Results from last 7 days   Lab Units 09/09/24  0441 09/08/24  0451 09/07/24  0437   WBC Thousand/uL 9.05 6.86 7.71   HEMOGLOBIN g/dL 11.6* 11.1* 11.6*   HEMATOCRIT % 33.9* 32.2* 33.4*   PLATELETS Thousands/uL 307 295 286     Results from last 7 days   Lab Units 09/09/24  0441 09/06/24  0453 09/05/24  0838   POTASSIUM mmol/L 4.0 4.0 3.9   CHLORIDE mmol/L 108 111* 106   CO2 mmol/L 21 20* 22   BUN mg/dL 22 19 21   CREATININE mg/dL 0.99 0.91 1.07   CALCIUM mg/dL 9.9 8.8 8.7     Results  from last 7 days   Lab Units 09/09/24  0441 09/08/24  0451 09/07/24  0437 09/02/24  1944 09/02/24  1302 09/02/24  1006   INR   --   --   --   --  1.00 0.97   PTT seconds 56* 78* 70*   < > 29 29    < > = values in this interval not displayed.       Studies:     Cardiac Cath:   mid RCA 80%, prox to mid LAD diffuse 80-90%, D1 80%, LCX/OM1 30%      Echo:   low normal LV fnx, LVEF 50%, Mild septal and apical hypokinesis. Diastolic function is mildly abnormal. RV size and fnx normal. No valve disease.      Carotid US:   < 50% bilat. Antegrade flow, no sig SCA dz bilat     Vein Mapping:   Adequate dimensions bilaterally     CTA Chest/abd/pelvis 9/2:   Mild atherosclerosis of the aortic arch and left subclavian ostium. The great vessels are otherwise patent without flow-limiting stenosis. Normal celiac axis anatomy with mild atherosclerosis of the ostium. The celiac axis, SMA, RAFAELA and renal arteries are patent without flow-limiting stenosis. Moderate atherosclerotic calcifications and plaque in the abdominal aorta and iliac bifurcation. Incidentally noted 2 right-sided renal arteries, normal variant.  No acute findings in the chest, abdomen, or pelvis. Specifically, no findings of aortic aortic syndrome.     CTA head/neck stroke alert 9/2:   Patent major vessels of the [place] of cason without high-grade stenosis.  No aneurysm. Atherosclerotic changes without hemodynamically significant stenosis in the cervical carotid or vertebral arteries.     CT brain 9/2:   No acute intracranial CT abnormality.  Chronic appearing inferior left cerebellar infarct. There is nonspecific 5 mm calcification versus calcific lesion in the right temporo-occipital junction (series 2 image 20). Recommend further characterization with brain MRI without and with contrast if no prior outside imaging is available.     MRI brain 9/3:  No acute process seen. Susceptibility focus in the right periatrial white matter corresponds to the calcification on  head CT. This is nonspecific and may be due to prior granulomatous process or cavernous malformation.     I have personally reviewed pertinent reports.   and I have personally reviewed pertinent films in PACS    Vitals:   Vitals:    09/08/24 1614 09/08/24 1615 09/08/24 2225 09/09/24 0302   BP: 142/79 142/79 132/83 150/81   BP Location:    Left arm   Pulse: 82 66 79 73   Resp:   16 15   Temp: 98.4 °F (36.9 °C) 98.4 °F (36.9 °C) 98.2 °F (36.8 °C) 98.1 °F (36.7 °C)   TempSrc:   Oral Oral   SpO2: 97% 97% 95% 96%   Height:           Physical Exam:    General: No acute distress and Alert  HEENT/NECK:  Normocephalic. Atraumatic.  No jugular venous distention.    Cardiac: Regular rate and rhythm  Pulmonary:  Breath sounds clear bilaterally  Abdomen:  Non-tender and Non-distended  Extremities: Extremities warm/dry and No edema B/L  Neuro: Alert and oriented X 3 and No focal deficits  Skin: Warm/Dry, without rashes or lesions.      Assessment:    - Severe MVCAD  - NSTEMI  - LVEF 50%  - Old CVA  - DM2, A1c 8.2  - Abnormal CT head, cleared by neurosurgery    Plan:  Patient agreeable to proceed with surgery; Informed consent signed    Cleared by neurosugery, follow up arranged, appreciate eval    Continue IV heparin and medical management per cardiology    Carotid ultrasound completed, and acceptable    Vein mapping completed, and acceptable    Blood type and screen completed, repeat tomorrow    Continue Mupirocin 2% nasal ointment q 12 hrs    Continue topical chlorhexidine bath and mouth rise    coronary artery bypass grafting scheduled for 9/11 with Dr. Shaun Hernandez D.O.    SIGNATURE: Luciano Myers PA-C  DATE: September 9, 2024  TIME: 8:15 AM    * This note was completed in part utilizing Optim[employer] direct voice recognition software.   Grammatical errors, random word insertion, spelling mistakes, and incomplete sentences may be an occasional consequence of the system secondary to software limitations, ambient noise  and hardware issues. At the time of dictation, efforts were made to edit, clarify and /or correct errors. Please read the chart carefully and recognize, using context, where substitutions have occurred.  If you have any questions or concerns about the context, text or information contained within the body of this dictation, please contact myself, the provider, for further clarification.

## 2024-09-09 NOTE — PROGRESS NOTES
[facility]  Progress Note  Name: Aristoteles Toribio Reyes I  MRN: [id number]  Unit/Bed#: [facility]-321-01 I Date of Admission: 9/5/2024   Date of Service: 9/9/2024 I Hospital Day: 4    Assessment & Plan   * CAD, multiple vessel  Assessment & Plan  Initially presented to [facility] for chest pain, found to have elevated troponins without EKG changes.  Underwent cardiac catheterization on 9/4 which showed multivessel disease.  Transferred to [place] for CT surgery evaluation/CABG.  CT surgery following, CABG Wednesday 9/11  Continue beta-blocker, aspirin, statin as well as heparin drip.  LVEF on echo 50%.  No significant valvular disease.  Telemetry    Stroke-like symptoms old CVA on CT  Assessment & Plan  Also with strokelike symptoms on presentation to [facility] with left lower extremity weakness which improved on repeat exam.  Was evaluated by neurology. Now resolved.   CT head showed chronic left cerebellar infarct, no evidence of acute stroke or hemorrhage.  Subsequent MRI brain without any acute findings, Susceptibility focus in the right periatrial white matter corresponds to the calcification on head CT. This is nonspecific and may be due to prior granulomatous process or cavernous malformation.   Continue aspirin, statin.  Neurology signed off.  CT surgery requested neurosurgery evaluation regarding small area of calcification noted on imaging.  Per neurosurgery, this appears to be a calcification however cavernous malformation cannot be ruled out.  Recommending MRI brain with contrast at some point in the future, can be done as outpatient.  Does not need to be done preop.    Type 2 diabetes mellitus, without long-term current use of insulin ([facility])  Assessment & Plan  Lab Results   Component Value Date    HGBA1C 8.4 (H) 09/02/2024       Recent Labs     09/08/24  1056 09/08/24  1613 09/08/24  2127 09/09/24  0555   POCGLU 204* 305* 181* 175*       Holding  home metformin  Continue Lantus 6 units nightly and Humalog 3 units 3 times daily with meals plus sliding scale  Diabetic diet  Monitor Accu-Cheks and adjust regimen as needed      HTN (hypertension)  Assessment & Plan  BP acceptable.  Continue PTA dose of lisinopril, now on beta-blocker and Norvasc as well.  Monitor           VTE Pharmacologic Prophylaxis:   Pharmacologic: Heparin Drip  Mechanical VTE Prophylaxis in Place: No    Patient Centered Rounds: I have performed bedside rounds with nursing staff today.        Time Spent for Care: 1 hour.  More than 50% of total time spent on counseling and coordination of care as described above.    Current Length of Stay: 4 day(s)    Current Patient Status: Inpatient     Code Status: Level 1 - Full Code      Subjective:   nad    Objective:     Vitals:   Temp (24hrs), Av.2 °F (36.8 °C), Min:97.8 °F (36.6 °C), Max:98.4 °F (36.9 °C)    Temp:  [97.8 °F (36.6 °C)-98.4 °F (36.9 °C)] 98.2 °F (36.8 °C)  HR:  [66-82] 78  Resp:  [15-16] 15  BP: (124-150)/(75-83) 125/80  SpO2:  [95 %-98 %] 97 %  Body mass index is 24.51 kg/m².     Input and Output Summary (last 24 hours):       Intake/Output Summary (Last 24 hours) at 2024 1118  Last data filed at 2024 0816  Gross per 24 hour   Intake 692.78 ml   Output 1525 ml   Net -832.22 ml       Physical Exam:     Physical Exam  Constitutional:       Appearance: Normal appearance.   HENT:      Head: Normocephalic and atraumatic.   Cardiovascular:      Rate and Rhythm: Normal rate and regular rhythm.   Pulmonary:      Effort: Pulmonary effort is normal.   Abdominal:      General: Abdomen is flat.      Palpations: Abdomen is soft.   Skin:     General: Skin is warm and dry.   Neurological:      General: No focal deficit present.      Mental Status: He is alert and oriented to person, place, and time.         Additional Data:     Labs:    Results from last 7 days   Lab Units 24  0441   WBC Thousand/uL 9.05   HEMOGLOBIN g/dL 11.6*    HEMATOCRIT % 33.9*   PLATELETS Thousands/uL 307   SEGS PCT % 55   LYMPHO PCT % 32   MONO PCT % 9   EOS PCT % 3     Results from last 7 days   Lab Units 09/09/24  0441 09/04/24  0225 09/03/24  0352   POTASSIUM mmol/L 4.0   < > 3.7   CHLORIDE mmol/L 108   < > 107   CO2 mmol/L 21   < > 22   BUN mg/dL 22   < > 16   CREATININE mg/dL 0.99   < > 0.95   CALCIUM mg/dL 9.9   < > 9.2   ALK PHOS U/L  --   --  62   ALT U/L  --   --  14   AST U/L  --   --  20    < > = values in this interval not displayed.     Results from last 7 days   Lab Units 09/02/24  1302   INR  1.00           Recent Cultures (last 7 days):           Last 24 Hours Medication List:   Current Facility-Administered Medications   Medication Dose Route Frequency Provider Last Rate    amLODIPine  5 mg Oral Daily SRINIVAS Wilkerson      aspirin  81 mg Oral Daily SRINIVAS Wilkerson      atorvastatin  80 mg Oral Daily With Dinner SRINIVAS Wilkerson      chlorhexidine  15 mL Mouth/Throat Q12H KALYANI SRINIVAS Wilkerson      heparin (porcine)  3-30 Units/kg/hr (Order-Specific) Intravenous Titrated SRINIVAS Wilkerson 20 Units/kg/hr (09/09/24 0551)    heparin (porcine)  2,400 Units Intravenous Q6H PRN SRINIVAS Wilkerson      heparin (porcine)  4,800 Units Intravenous Q6H PRN SRINIVAS Wilkerson      insulin glargine  6 Units Subcutaneous HS Ceci Tate DO      insulin lispro  1-5 Units Subcutaneous TID AC SRINIVAS Wilkerson      insulin lispro  3 Units Subcutaneous TID With Meals Ceci Tate DO      metoprolol succinate  12.5 mg Oral Daily SRINIVAS Wilkerson      nitroglycerin  0.4 mg Sublingual Q5 Min PRN SRINIVAS Wilkerson          Today, Patient Was Seen By: Ralph Centeno DO    ** Please Note: Dictation voice to text software may have been used in the creation of this document. **

## 2024-09-09 NOTE — ASSESSMENT & PLAN NOTE
With NSTEMI --> MV disease on cardiac cath. Transferred here for CABG eval.   LVEF 50% on echo, no significant valvular disease.  CT surgery following  Remains chest pain-free on aspirin, beta-blocker, statin and heparin infusion

## 2024-09-09 NOTE — ASSESSMENT & PLAN NOTE
Presented to [facility] with CP with elevated troponin  Found to have multivessel CAD on cardiac catheterization therefore transferred for CABG evaluation

## 2024-09-10 ENCOUNTER — ANESTHESIA EVENT (INPATIENT)
Dept: PERIOP | Facility: HOSPITAL | Age: 59
End: 2024-09-10
Payer: COMMERCIAL

## 2024-09-10 LAB
ABO GROUP BLD: NORMAL
APTT PPP: 27 SECONDS (ref 23–34)
APTT PPP: 94 SECONDS (ref 23–34)
BLD GP AB SCN SERPL QL: NEGATIVE
GLUCOSE SERPL-MCNC: 116 MG/DL (ref 65–140)
GLUCOSE SERPL-MCNC: 154 MG/DL (ref 65–140)
GLUCOSE SERPL-MCNC: 173 MG/DL (ref 65–140)
GLUCOSE SERPL-MCNC: 181 MG/DL (ref 65–140)
RH BLD: POSITIVE
SPECIMEN EXPIRATION DATE: NORMAL

## 2024-09-10 PROCEDURE — 99232 SBSQ HOSP IP/OBS MODERATE 35: CPT | Performed by: INTERNAL MEDICINE

## 2024-09-10 PROCEDURE — 85730 THROMBOPLASTIN TIME PARTIAL: CPT | Performed by: INTERNAL MEDICINE

## 2024-09-10 PROCEDURE — 86850 RBC ANTIBODY SCREEN: CPT | Performed by: PHYSICIAN ASSISTANT

## 2024-09-10 PROCEDURE — 86901 BLOOD TYPING SEROLOGIC RH(D): CPT | Performed by: PHYSICIAN ASSISTANT

## 2024-09-10 PROCEDURE — 99232 SBSQ HOSP IP/OBS MODERATE 35: CPT | Performed by: STUDENT IN AN ORGANIZED HEALTH CARE EDUCATION/TRAINING PROGRAM

## 2024-09-10 PROCEDURE — 86920 COMPATIBILITY TEST SPIN: CPT

## 2024-09-10 PROCEDURE — 82948 REAGENT STRIP/BLOOD GLUCOSE: CPT

## 2024-09-10 PROCEDURE — NC001 PR NO CHARGE: Performed by: PHYSICIAN ASSISTANT

## 2024-09-10 PROCEDURE — 86900 BLOOD TYPING SEROLOGIC ABO: CPT | Performed by: PHYSICIAN ASSISTANT

## 2024-09-10 RX ADMIN — INSULIN LISPRO 3 UNITS: 100 INJECTION, SOLUTION INTRAVENOUS; SUBCUTANEOUS at 12:00

## 2024-09-10 RX ADMIN — INSULIN LISPRO 3 UNITS: 100 INJECTION, SOLUTION INTRAVENOUS; SUBCUTANEOUS at 17:10

## 2024-09-10 RX ADMIN — CHLORHEXIDINE GLUCONATE 0.12% ORAL RINSE 15 ML: 1.2 LIQUID ORAL at 21:10

## 2024-09-10 RX ADMIN — HEPARIN SODIUM 4800 UNITS: 1000 INJECTION INTRAVENOUS; SUBCUTANEOUS at 17:09

## 2024-09-10 RX ADMIN — AMLODIPINE BESYLATE 5 MG: 5 TABLET ORAL at 08:49

## 2024-09-10 RX ADMIN — INSULIN LISPRO 1 UNITS: 100 INJECTION, SOLUTION INTRAVENOUS; SUBCUTANEOUS at 06:00

## 2024-09-10 RX ADMIN — HEPARIN SODIUM 18 UNITS/KG/HR: 10000 INJECTION, SOLUTION INTRAVENOUS at 15:10

## 2024-09-10 RX ADMIN — CHLORHEXIDINE GLUCONATE 0.12% ORAL RINSE 15 ML: 1.2 LIQUID ORAL at 08:49

## 2024-09-10 RX ADMIN — INSULIN GLARGINE 6 UNITS: 100 INJECTION, SOLUTION SUBCUTANEOUS at 21:10

## 2024-09-10 RX ADMIN — ATORVASTATIN CALCIUM 80 MG: 80 TABLET, FILM COATED ORAL at 17:09

## 2024-09-10 RX ADMIN — INSULIN LISPRO 3 UNITS: 100 INJECTION, SOLUTION INTRAVENOUS; SUBCUTANEOUS at 07:54

## 2024-09-10 RX ADMIN — INSULIN LISPRO 1 UNITS: 100 INJECTION, SOLUTION INTRAVENOUS; SUBCUTANEOUS at 17:00

## 2024-09-10 RX ADMIN — METOPROLOL SUCCINATE 12.5 MG: 25 TABLET, EXTENDED RELEASE ORAL at 08:49

## 2024-09-10 RX ADMIN — ASPIRIN 81 MG: 81 TABLET, COATED ORAL at 08:49

## 2024-09-10 NOTE — ASSESSMENT & PLAN NOTE
Also with strokelike symptoms on presentation to [facility] with left lower extremity weakness which improved on repeat exam.  Was evaluated by neurology. Now resolved.   CT head showed chronic left cerebellar infarct, no evidence of acute stroke or hemorrhage.  Subsequent MRI brain without any acute findings, Susceptibility focus in the right periatrial white matter corresponds to the calcification on head CT. This is nonspecific and may be due to prior granulomatous process or cavernous malformation.   Continue aspirin, statin.  Neurology signed off.  CT surgery requested neurosurgery evaluation regarding small area of calcification noted on imaging.  Per neurosurgery, this appears to be a calcification however cavernous malformation cannot be ruled out.  Recommending MRI brain with contrast at some point in the future, can be done as outpatient.  Does not need to be done preop.  Neurology and neurosurgery inputs noted

## 2024-09-10 NOTE — ASSESSMENT & PLAN NOTE
Lab Results   Component Value Date    HGBA1C 8.4 (H) 09/02/2024       Recent Labs     09/09/24  1542 09/09/24 2056 09/10/24  0551 09/10/24  1122   POCGLU 218* 146* 154* 116     Metformin on hold while inpatient  Diabetes mellitus type 2 uncontrolled  Continue Lantus 6 units 3 times daily with meals, Humalog 3 units 3 times daily with meals  Monitor Accu-Cheks  Avoid hypoglycemia

## 2024-09-10 NOTE — PROGRESS NOTES
Progress Note - Hospitalist   Name: Aristoteles Toribio Reyes 58 y.o. male I MRN: [id number]  Unit/Bed#: PPHP-321-01 I Date of Admission: 9/5/2024   Date of Service: 9/10/2024 I Hospital Day: 5    Assessment & Plan  CAD, multiple vessel  Initially presented to [facility] for chest pain, found to have elevated troponins without EKG changes.  Underwent cardiac catheterization on 9/4 which showed multivessel disease.  Transferred to [place] for CT surgery evaluation/CABG.  Continue aspirin, atorvastatin, metoprolol  Continue IV heparin  CT surgery plans for CABG noted  Cardiology inputs noted  HTN (hypertension)  Blood pressures reviewed, acceptable  Continue amlodipine 5 mg p.o. daily  Lisinopril on hold perioperatively  Monitor blood pressures, avoid hypotension  CVA (cerebral vascular accident) ([facility])  Neurology and neurosurgery inputs noted  Continue aspirin, atorvastatin  Type 2 diabetes mellitus, without long-term current use of insulin ([facility])  Lab Results   Component Value Date    HGBA1C 8.4 (H) 09/02/2024       Recent Labs     09/09/24  1542 09/09/24  2056 09/10/24  0551 09/10/24  1122   POCGLU 218* 146* 154* 116     Metformin on hold while inpatient  Diabetes mellitus type 2 uncontrolled  Continue Lantus 6 units 3 times daily with meals, Humalog 3 units 3 times daily with meals  Monitor Accu-Cheks  Avoid hypoglycemia    Stroke-like symptoms old CVA on CT  Also with strokelike symptoms on presentation to [facility] with left lower extremity weakness which improved on repeat exam.  Was evaluated by neurology. Now resolved.   CT head showed chronic left cerebellar infarct, no evidence of acute stroke or hemorrhage.  Subsequent MRI brain without any acute findings, Susceptibility focus in the right periatrial white matter corresponds to the calcification on head CT. This is nonspecific and may be due to prior granulomatous process or cavernous malformation.   Continue aspirin, statin.  Neurology  signed off.  CT surgery requested neurosurgery evaluation regarding small area of calcification noted on imaging.  Per neurosurgery, this appears to be a calcification however cavernous malformation cannot be ruled out.  Recommending MRI brain with contrast at some point in the future, can be done as outpatient.  Does not need to be done preop.  Neurology and neurosurgery inputs noted    VTE Pharmacologic Prophylaxis:   High Risk (Score >/= 5) - Pharmacological DVT Prophylaxis Ordered: heparin drip. Sequential Compression Devices Ordered.    Mobility:   Basic Mobility Inpatient Raw Score: 24  JH-HLM Goal: 8: Walk 250 feet or more  JH-HLM Achieved: 3: Sit at edge of bed  JH-HLM Goal achieved. Continue to encourage appropriate mobility.    Patient Centered Rounds: I performed bedside rounds with nursing staff today.   Discussions with Specialists or Other Care Team Provider: Case management    Education and Discussions with Family / Patient:  Discussed with patient, family at bedside updated.     Current Length of Stay: 5 day(s)  Current Patient Status: Inpatient   Certification Statement: The patient will continue to require additional inpatient hospital stay due to as outlined  Discharge Plan:  CT surgery plans for CABG noted    Code Status: Level 1 - Full Code    Subjective     Comfortably in bed  Denies chest pain shortness of breath  History chart labs medications reviewed  Encourage incentive spirometry    Objective     Vitals:   Temp (24hrs), Av °F (36.7 °C), Min:97.6 °F (36.4 °C), Max:98.3 °F (36.8 °C)    Temp:  [97.6 °F (36.4 °C)-98.3 °F (36.8 °C)] 98.2 °F (36.8 °C)  HR:  [70-80] 76  Resp:  [17] 17  BP: (122-135)/(74-80) 122/76  SpO2:  [97 %-100 %] 97 %  Body mass index is 24.51 kg/m².     Input and Output Summary (last 24 hours):     Intake/Output Summary (Last 24 hours) at 9/10/2024 1542  Last data filed at 9/10/2024 1300  Gross per 24 hour   Intake 802.4 ml   Output 1800 ml   Net -997.6 ml        Physical Exam     Comfortably in bed  Neck supple  Lungs diminished breath sounds at the bases  Vesicular breath sounds  Heart sounds S1-S2 noted  Abdomen soft nontender  Awake, follows commands  No pedal edema  No rash    Lines/Drains:  Lines/Drains/Airways       Active Status       None                      Telemetry:  Telemetry Orders (From admission, onward)               24 Hour Telemetry Monitoring  Continuous x 24 Hours (Telem)        Question:  Reason for 24 Hour Telemetry  Answer:  PCI/EP study (including pacer and ICD implementation), Cardiac surgery, MI, abnormal cardiac cath, and chest pain- rule out MI                     Telemetry Reviewed: Sinus rhythm  Indication for Continued Telemetry Use: Awaiting PCI/EP Study/CABG               Lab Results: I have reviewed the following results: CBC/BMP: No new results in last 24 hours. , PTT/INR:  .     09/10/24  0502   PTT 94*    , Troponin,BNP:No new results in last 24 hours.    Results from last 7 days   Lab Units 09/09/24  0441   WBC Thousand/uL 9.05   HEMOGLOBIN g/dL 11.6*   HEMATOCRIT % 33.9*   PLATELETS Thousands/uL 307   SEGS PCT % 55   LYMPHO PCT % 32   MONO PCT % 9   EOS PCT % 3     Results from last 7 days   Lab Units 09/09/24  0441   SODIUM mmol/L 140   POTASSIUM mmol/L 4.0   CHLORIDE mmol/L 108   CO2 mmol/L 21   BUN mg/dL 22   CREATININE mg/dL 0.99   ANION GAP mmol/L 11   CALCIUM mg/dL 9.9   GLUCOSE RANDOM mg/dL 161*         Results from last 7 days   Lab Units 09/10/24  1122 09/10/24  0551 09/09/24  2056 09/09/24  1542 09/09/24  1126 09/09/24  0555 09/08/24  2127 09/08/24  1613 09/08/24  1056 09/08/24  0532 09/07/24  2025 09/07/24  1606   POC GLUCOSE mg/dl 116 154* 146* 218* 174* 175* 181* 305* 204* 134 221* 194*               Recent Cultures (last 7 days):         Imaging Review: Reviewed radiology reports from this admission including: CT chest, CT abdomen/pelvis, CT head, MRI brain, procedure reports, and Echocardiogram.  Other Studies:  EKG was reviewed.     Last 24 Hours Medication List:     Current Facility-Administered Medications:     amLODIPine (NORVASC) tablet 5 mg, Daily    aspirin (ECOTRIN LOW STRENGTH) EC tablet 81 mg, Daily    atorvastatin (LIPITOR) tablet 80 mg, Daily With Dinner    chlorhexidine (PERIDEX) 0.12 % oral rinse 15 mL, Q12H KALYANI    heparin (porcine) 25,000 units in 0.45% NaCl 250 mL infusion (premix), Titrated, Last Rate: 18 Units/kg/hr (09/10/24 0553)    heparin (porcine) injection 2,400 Units, Q6H PRN    heparin (porcine) injection 4,800 Units, Q6H PRN    insulin glargine (LANTUS) subcutaneous injection 6 Units 0.06 mL, HS    insulin lispro (HumALOG/ADMELOG) 100 units/mL subcutaneous injection 1-5 Units, TID AC **AND** Fingerstick Glucose (POCT), TID AC    insulin lispro (HumALOG/ADMELOG) 100 units/mL subcutaneous injection 3 Units, TID With Meals    metoprolol succinate (TOPROL-XL) 24 hr tablet 12.5 mg, Daily    nitroglycerin (NITROSTAT) SL tablet 0.4 mg, Q5 Min PRN    Administrative Statements   Today, Patient Was Seen By: Roland Coronel MD  I have spent a total time of 32 minutes in caring for this patient on the day of the visit/encounter     **Please Note: This note may have been constructed using a voice recognition system.**

## 2024-09-10 NOTE — ANESTHESIA PREPROCEDURE EVALUATION
Procedure:  CORONARY ARTERY BYPASS GRAFT (CABG) 2 VESSELS (Chest)    Relevant Problems   ANESTHESIA (within normal limits)      CARDIO   (+) CAD, multiple vessel   (+) HTN (hypertension)   (+) NSTEMI (non-ST elevated myocardial infarction) ([facility])      ENDO   (+) Type 2 diabetes mellitus, without long-term current use of insulin ([facility])      NEURO/PSYCH   (+) CVA (cerebral vascular accident) ([facility])      Neurology/Sleep   (+) Stroke-like symptoms old CVA on CT      Normal sinus rhythm    Prox LAD to Mid LAD lesion is 80% stenosed.    Prox RCA to Mid RCA lesion is 80% stenosed.    Mid Cx lesion is 40% stenosed.     Multivessel CA; severe multivessel diffuse disease   Mid RCA 80% segmental stenosis   Prox to mid LAD diffuse 80-90% stenosis might have decent landing LIMA target distally  Diagonal 1 has 80% but smaller caliber   Lcx/Om1 has 30% stenosis      LVEDP 12mmHg  Right radial access status post TR band       Findings    Left Ventricle Left ventricular cavity size is normal. Wall thickness is normal. The left ventricular ejection fraction is 50%. Systolic function is low normal.  Mild septal and apical hypokinesis. Diastolic function is mildly abnormal, consistent with grade I (abnormal) relaxation.   Right Ventricle Right ventricular cavity size is normal. Systolic function is normal. Wall thickness is normal.   Left Atrium The atrium is normal in size.   Right Atrium The atrium is normal in size.   Aortic Valve The aortic valve was not well visualized. The aortic valve is trileaflet. The leaflets are mildly thickened.   Mitral Valve There is no evidence of regurgitation. There is no evidence of stenosis. The mitral valve has normal structure and normal function.   Tricuspid Valve Tricuspid valve structure is normal. There is no evidence of regurgitation. There is no evidence of stenosis.   Pulmonic Valve Pulmonic valve structure is normal. There is no evidence of regurgitation. There is no evidence of stenosis.    Ascending Aorta The aortic root is normal in size.   Pericardium There is no pericardial effusion. The pericardium is normal in appearance.       Normal sinus rhythm  Nonspecific T wave abnormality  Abnormal ECG  When compared with ECG of 02-SEP-2024 14:44,  T wave inversion now evident in Anterior leads  Physical Exam    Airway    Mallampati score: III  TM Distance: >3 FB  Neck ROM: full     Dental   No notable dental hx     Cardiovascular  Rhythm: regular, Rate: normal, Cardiovascular exam normal    Pulmonary  Pulmonary exam normal Breath sounds clear to auscultation    Other Findings        Anesthesia Plan  ASA Score- 4     Anesthesia Type- general with ASA Monitors.         Additional Monitors: arterial line, central venous line and pulmonary artery catheter.    Airway Plan: ETT.    Comment: MARY.       Plan Factors-Exercise tolerance (METS): <4 METS.    Chart reviewed. EKG reviewed.  Existing labs reviewed. Patient summary reviewed.    Patient is not a current smoker.              Induction- intravenous.    Postoperative Plan-     Perioperative Resuscitation Plan - Level 1 - Full Code.       Informed Consent- Anesthetic plan and risks discussed with patient.  I personally reviewed this patient with the CRNA. Discussed and agreed on the Anesthesia Plan with the CRNA..

## 2024-09-10 NOTE — PROGRESS NOTES
Progress Note - Cardiology   Name: Aristoteles Toribio Reyes 58 y.o. male I MRN: [id number]  Unit/Bed#: PPHP-321-01 I Date of Admission: 9/5/2024   Date of Service: 9/10/2024 I Hospital Day: 5     Assessment & Plan  NSTEMI (non-ST elevated myocardial infarction) (HCC)  Presented to [facility] with CP with elevated troponin  Found to have multivessel CAD on cardiac catheterization therefore transferred for CABG evaluation  CAD, multiple vessel  With NSTEMI --> MV disease on cardiac cath. Transferred here for CABG eval.   LVEF 50% on echo, no significant valvular disease.  CT surgery following  Remains chest pain-free on aspirin, beta-blocker, statin and heparin infusion  HTN (hypertension)  Average BP 39/79 on metoprolol succinate 12.5mg daily and amlodipine 5 mg daily  Lisinopril held perioperatively    Type 2 diabetes mellitus, without long-term current use of insulin ([facility])  Hemoglobin A1c 8.4.  Management per primary team.  Stroke-like symptoms old CVA on CT  Evaluated by neurology and cleared for AC/AP  Neurology signed off at [facility]      Plan/Recommendations:  Scheduled for CABG tomorrow  Continue aspirin, beta-blocker, statin and heparin infusion  Continue amlodipine  Continue to hold lisinopril perioperatively  Continue to monitor on telemetry  Cardiology will follow postoperatively    _____________________________________________________________________    Subjective    Patient seen and examined.  No acute events overnight.    Review of Systems   Constitutional: Negative. Negative for chills.   Cardiovascular:  Negative for chest pain, dyspnea on exertion, leg swelling, near-syncope, orthopnea, palpitations, paroxysmal nocturnal dyspnea and syncope.   Respiratory: Negative.  Negative for cough, shortness of breath and wheezing.    Endocrine: Negative.    Hematologic/Lymphatic: Negative.    Skin: Negative.    Musculoskeletal: Negative.    Gastrointestinal: Negative.  Negative for diarrhea, nausea and  "vomiting.   Neurological:  Negative for dizziness, light-headedness and weakness.   Psychiatric/Behavioral: Negative.  Negative for altered mental status.    All other systems reviewed and are negative.      Objective:   Vitals: Blood pressure 131/80, pulse 70, temperature 97.9 °F (36.6 °C), resp. rate 17, height 5' 2\" (1.575 m), weight 60.8 kg (134 lb), SpO2 98%.,     Wt Readings from Last 3 Encounters:   24 60.8 kg (134 lb)   24 60.8 kg (134 lb)   24 60.8 kg (134 lb)        Lab Results   Component Value Date    CREATININE 0.99 2024    CREATININE 0.91 2024    CREATININE 1.07 2024         Body mass index is 24.51 kg/m².,     Systolic (24hrs), Av , Min:115 , Max:135     Diastolic (24hrs), Av, Min:74, Max:80          Intake/Output Summary (Last 24 hours) at 9/10/2024 0836  Last data filed at 9/10/2024 0553  Gross per 24 hour   Intake 622.4 ml   Output 1800 ml   Net -1177.6 ml     Weight (last 2 days)       Date/Time Weight    24 0852 60.8 (134)              Telemetry Review: No significant arrhythmias seen on telemetry review.       Physical Exam  Vitals and nursing note reviewed.   Constitutional:       General: He is not in acute distress.     Appearance: He is well-developed.      Comments: On RA in NAD   HENT:      Head: Normocephalic and atraumatic.   Neck:      Vascular: No JVD.   Cardiovascular:      Rate and Rhythm: Normal rate and regular rhythm.      Heart sounds: Normal heart sounds. No murmur heard.     No friction rub. No gallop.   Pulmonary:      Effort: Pulmonary effort is normal. No respiratory distress.      Breath sounds: Normal breath sounds. No wheezing or rales.   Chest:      Chest wall: No tenderness.   Abdominal:      General: Bowel sounds are normal. There is no distension.      Palpations: Abdomen is soft.      Tenderness: There is no abdominal tenderness.   Musculoskeletal:         General: No tenderness. Normal range of motion.      Cervical " "back: Normal range of motion and neck supple.      Right lower leg: No edema.      Left lower leg: No edema.   Skin:     General: Skin is warm and dry.      Coloration: Skin is not pale.      Findings: No erythema.   Neurological:      Mental Status: He is alert and oriented to person, place, and time.   Psychiatric:         Mood and Affect: Mood normal.         Behavior: Behavior normal.         Thought Content: Thought content normal.         Judgment: Judgment normal.         LABORATORY RESULTS      CBC with diff:   Results from last 7 days   Lab Units 09/09/24 0441 09/08/24 0451 09/07/24  0437 09/06/24  0453 09/05/24  0838 09/04/24  0225   WBC Thousand/uL 9.05 6.86 7.71 6.78 7.09 8.84   HEMOGLOBIN g/dL 11.6* 11.1* 11.6* 11.5* 12.2 12.3   HEMATOCRIT % 33.9* 32.2* 33.4* 33.6* 36.6 35.9*   MCV fL 89 90 89 90 90 87   PLATELETS Thousands/uL 307 295 286 286 296 300   RBC Million/uL 3.80* 3.58* 3.75* 3.74* 4.05 4.11   MCH pg 30.5 31.0 30.9 30.7 30.1 29.9   MCHC g/dL 34.2 34.5 34.7 34.2 33.3 34.3   RDW % 12.8 12.7 12.5 12.5 12.3 12.3   MPV fL 11.1 11.8 11.0 10.7 10.4 10.7   NRBC AUTO /100 WBCs 0 0 0 0  --   --        CMP:  Results from last 7 days   Lab Units 09/09/24 0441 09/06/24 0453 09/05/24  0838 09/04/24  0225   POTASSIUM mmol/L 4.0 4.0 3.9 3.6   CHLORIDE mmol/L 108 111* 106 107   CO2 mmol/L 21 20* 22 23   BUN mg/dL 22 19 21 22   CREATININE mg/dL 0.99 0.91 1.07 1.10   CALCIUM mg/dL 9.9 8.8 8.7 9.0   EGFR ml/min/1.73sq m 83 92 76 73       BMP:  Results from last 7 days   Lab Units 09/09/24 0441 09/06/24  0453 09/05/24  0838 09/04/24  0225   POTASSIUM mmol/L 4.0 4.0 3.9 3.6   CHLORIDE mmol/L 108 111* 106 107   CO2 mmol/L 21 20* 22 23   BUN mg/dL 22 19 21 22   CREATININE mg/dL 0.99 0.91 1.07 1.10   CALCIUM mg/dL 9.9 8.8 8.7 9.0       No results found for: \"NTBNP\"          Results from last 7 days   Lab Units 09/05/24  0838 09/04/24  1124   MAGNESIUM mg/dL 2.0 1.5*         Lipid Profile:   No results found for: " "\"CHOL\"  Lab Results   Component Value Date    HDL 49 09/02/2024     Lab Results   Component Value Date    LDLCALC 76 09/02/2024     Lab Results   Component Value Date    TRIG 185 (H) 09/02/2024         Meds/Allergies   all current active meds have been reviewed, current meds:   Current Facility-Administered Medications:     amLODIPine (NORVASC) tablet 5 mg, Daily    aspirin (ECOTRIN LOW STRENGTH) EC tablet 81 mg, Daily    atorvastatin (LIPITOR) tablet 80 mg, Daily With Dinner    chlorhexidine (PERIDEX) 0.12 % oral rinse 15 mL, Q12H KALYANI    heparin (porcine) 25,000 units in 0.45% NaCl 250 mL infusion (premix), Titrated, Last Rate: 18 Units/kg/hr (09/10/24 0553)    heparin (porcine) injection 2,400 Units, Q6H PRN    heparin (porcine) injection 4,800 Units, Q6H PRN    insulin glargine (LANTUS) subcutaneous injection 6 Units 0.06 mL, HS    insulin lispro (HumALOG/ADMELOG) 100 units/mL subcutaneous injection 1-5 Units, TID AC **AND** Fingerstick Glucose (POCT), TID AC    insulin lispro (HumALOG/ADMELOG) 100 units/mL subcutaneous injection 3 Units, TID With Meals    metoprolol succinate (TOPROL-XL) 24 hr tablet 12.5 mg, Daily    nitroglycerin (NITROSTAT) SL tablet 0.4 mg, Q5 Min PRN, and PTA meds:   Prior to Admission Medications   Prescriptions Last Dose Informant Patient Reported? Taking?   lisinopril (ZESTRIL) 10 mg tablet   Yes No   Sig: Take 10 mg by mouth daily   metFORMIN (GLUCOPHAGE) 1000 MG tablet   Yes No   Sig: Take 1,000 mg by mouth 2 (two) times a day with meals      Facility-Administered Medications: None       Medications Prior to Admission:     lisinopril (ZESTRIL) 10 mg tablet    metFORMIN (GLUCOPHAGE) 1000 MG tablet    heparin (porcine), 3-30 Units/kg/hr (Order-Specific), Last Rate: 18 Units/kg/hr (09/10/24 0553)        Counseling / Coordination of Care  Total floor / unit time spent today 20 minutes.  Greater than 50% of total time was spent with the patient and / or family counseling and / or " coordination of care.      ** Please Note: Dragon 360 Dictation voice to text software may have been used in the creation of this document. **

## 2024-09-10 NOTE — ASSESSMENT & PLAN NOTE
Initially presented to [facility] for chest pain, found to have elevated troponins without EKG changes.  Underwent cardiac catheterization on 9/4 which showed multivessel disease.  Transferred to [place] for CT surgery evaluation/CABG.  Continue aspirin, atorvastatin, metoprolol  Continue IV heparin  CT surgery plans for CABG noted  Cardiology inputs noted

## 2024-09-10 NOTE — ASSESSMENT & PLAN NOTE
Blood pressures reviewed, acceptable  Continue amlodipine 5 mg p.o. daily  Lisinopril on hold perioperatively  Monitor blood pressures, avoid hypotension

## 2024-09-10 NOTE — PROGRESS NOTES
Progress Note - Cardiothoracic Surgery   Aristoteles Toribio Reyes 58 y.o. male MRN: [id number]  Unit/Bed#: PPHP-321-01 Encounter: [id number]      24 Hour Events: Vitals stable. On IV heparin. No events. Denies CP/SOB.     Medications:   Scheduled Meds:  Current Facility-Administered Medications   Medication Dose Route Frequency Provider Last Rate    amLODIPine  5 mg Oral Daily SRINIVAS Wilkerson      aspirin  81 mg Oral Daily SRINIVAS Wilkerson      atorvastatin  80 mg Oral Daily With Dinner SRINIVAS Wilkerson      chlorhexidine  15 mL Mouth/Throat Q12H KALYANI SRINIVAS Wilkerson      heparin (porcine)  3-30 Units/kg/hr (Order-Specific) Intravenous Titrated SRINIVAS Wilkerson 18 Units/kg/hr (09/10/24 0553)    heparin (porcine)  2,400 Units Intravenous Q6H PRN SRINIVAS Wilkerson      heparin (porcine)  4,800 Units Intravenous Q6H PRN SRINIVAS Wilkerson      insulin glargine  6 Units Subcutaneous HS Ceci Tate DO      insulin lispro  1-5 Units Subcutaneous TID AC SRINIVAS Wilkerson      insulin lispro  3 Units Subcutaneous TID With Meals Ceci Tate DO      metoprolol succinate  12.5 mg Oral Daily SRINIVAS Wilkerson      nitroglycerin  0.4 mg Sublingual Q5 Min PRN SRINIVAS Wilkerson       Continuous Infusions:heparin (porcine), 3-30 Units/kg/hr (Order-Specific), Last Rate: 18 Units/kg/hr (09/10/24 0553)        Results:   Results from last 7 days   Lab Units 09/09/24  0441 09/08/24  0451 09/07/24  0437   WBC Thousand/uL 9.05 6.86 7.71   HEMOGLOBIN g/dL 11.6* 11.1* 11.6*   HEMATOCRIT % 33.9* 32.2* 33.4*   PLATELETS Thousands/uL 307 295 286     Results from last 7 days   Lab Units 09/09/24  0441 09/06/24  0453 09/05/24  0838   POTASSIUM mmol/L 4.0 4.0 3.9   CHLORIDE mmol/L 108 111* 106   CO2 mmol/L 21 20* 22   BUN mg/dL 22 19 21   CREATININE mg/dL 0.99 0.91 1.07   CALCIUM mg/dL 9.9 8.8 8.7     Results from last 7 days   Lab Units 09/10/24  6532  09/09/24  1826 09/09/24  1218   PTT seconds 94* 61* 86*       Studies:     Cardiac Cath:   mid RCA 80%, prox to mid LAD diffuse 80-90%, D1 80%, LCX/OM1 30%      Echo:   low normal LV fnx, LVEF 50%, Mild septal and apical hypokinesis. Diastolic function is mildly abnormal. RV size and fnx normal. No valve disease.      Carotid US:   < 50% bilat. Antegrade flow, no sig SCA dz bilat     Vein Mapping:   Adequate dimensions bilaterally     CTA Chest/abd/pelvis 9/2:   Mild atherosclerosis of the aortic arch and left subclavian ostium. The great vessels are otherwise patent without flow-limiting stenosis. Normal celiac axis anatomy with mild atherosclerosis of the ostium. The celiac axis, SMA, RAFAELA and renal arteries are patent without flow-limiting stenosis. Moderate atherosclerotic calcifications and plaque in the abdominal aorta and iliac bifurcation. Incidentally noted 2 right-sided renal arteries, normal variant.  No acute findings in the chest, abdomen, or pelvis. Specifically, no findings of aortic aortic syndrome.     CTA head/neck stroke alert 9/2:   Patent major vessels of the [place] of cason without high-grade stenosis.  No aneurysm. Atherosclerotic changes without hemodynamically significant stenosis in the cervical carotid or vertebral arteries.     CT brain 9/2:   No acute intracranial CT abnormality.  Chronic appearing inferior left cerebellar infarct. There is nonspecific 5 mm calcification versus calcific lesion in the right temporo-occipital junction (series 2 image 20). Recommend further characterization with brain MRI without and with contrast if no prior outside imaging is available.     MRI brain 9/3:  No acute process seen. Susceptibility focus in the right periatrial white matter corresponds to the calcification on head CT. This is nonspecific and may be due to prior granulomatous process or cavernous malformation.     I have personally reviewed pertinent reports.   and I have personally reviewed  pertinent films in PACS    Vitals:   Vitals:    09/09/24 1851 09/09/24 2212 09/10/24 0259 09/10/24 0713   BP: 132/80 135/80 134/74 131/80   BP Location:       Pulse: 80 74 74 70   Resp:   17    Temp: 98.1 °F (36.7 °C) 98.3 °F (36.8 °C) 97.6 °F (36.4 °C) 97.9 °F (36.6 °C)   TempSrc:       SpO2: 100% 100% 98% 98%   Weight:       Height:           Physical Exam:    General: No acute distress and Alert  HEENT/NECK:  Normocephalic. Atraumatic.  No jugular venous distention.    Cardiac: Regular rate and rhythm and No murmurs/rubs/gallops  Pulmonary:  Breath sounds clear bilaterally  Abdomen:  Non-tender and Non-distended  Extremities: Extremities warm/dry and No edema B/L  Neuro: Alert and oriented X 3 and No focal deficits  Skin: Warm/Dry, without rashes or lesions.    Assessment:    - Severe MVCAD  - NSTEMI  - LVEF 50%  - Old CVA  - DM2, A1c 8.2  - Abnormal CT head, cleared by neurosurgery    Plan:  Patient agreeable to proceed with surgery; Informed consent signed    Cleared by neurosugery, follow up arranged, appreciate eval    Continue IV heparin and medical management per cardiology    Carotid ultrasound completed, and acceptable    Vein mapping completed, and acceptable    Blood type and screen completed, repeat tomorrow    Continue Mupirocin 2% nasal ointment q 12 hrs    Continue topical chlorhexidine bath and mouth rise    NPO after midnight  RBC prepared  Hold Heparin at 0400  Preoperative beta blocker and antibiotics ordered  Coronary artery bypass grafting scheduled for 9/11 with Dr. Shaun Hernandez D.O.    SIGNATURE: Luciano Myers PA-C  DATE: September 10, 2024  TIME: 9:33 AM    * This note was completed in part utilizing [employer] direct voice recognition software.   Grammatical errors, random word insertion, spelling mistakes, and incomplete sentences may be an occasional consequence of the system secondary to software limitations, ambient noise and hardware issues. At the time of dictation, efforts  were made to edit, clarify and /or correct errors. Please read the chart carefully and recognize, using context, where substitutions have occurred.  If you have any questions or concerns about the context, text or information contained within the body of this dictation, please contact myself, the provider, for further clarification.

## 2024-09-11 ENCOUNTER — APPOINTMENT (INPATIENT)
Dept: RADIOLOGY | Facility: HOSPITAL | Age: 59
End: 2024-09-11
Payer: COMMERCIAL

## 2024-09-11 ENCOUNTER — APPOINTMENT (INPATIENT)
Dept: NON INVASIVE DIAGNOSTICS | Facility: HOSPITAL | Age: 59
End: 2024-09-11
Attending: THORACIC SURGERY (CARDIOTHORACIC VASCULAR SURGERY)
Payer: COMMERCIAL

## 2024-09-11 ENCOUNTER — ANESTHESIA (INPATIENT)
Dept: PERIOP | Facility: HOSPITAL | Age: 59
End: 2024-09-11
Payer: COMMERCIAL

## 2024-09-11 PROBLEM — Z95.1 S/P CABG X 2: Status: ACTIVE | Noted: 2024-09-11

## 2024-09-11 LAB
ABO GROUP BLD BPU: NORMAL
ANION GAP SERPL CALCULATED.3IONS-SCNC: 8 MMOL/L (ref 4–13)
APTT PPP: 183 SECONDS (ref 23–34)
BASE EXCESS BLDA CALC-SCNC: -1 MMOL/L (ref -2–3)
BASE EXCESS BLDA CALC-SCNC: -1 MMOL/L (ref -2–3)
BASE EXCESS BLDA CALC-SCNC: -2 MMOL/L (ref -2–3)
BASE EXCESS BLDA CALC-SCNC: -4 MMOL/L (ref -2–3)
BASE EXCESS BLDA CALC-SCNC: -5 MMOL/L (ref -2–3)
BASE EXCESS BLDA CALC-SCNC: 0 MMOL/L (ref -2–3)
BPU ID: NORMAL
BUN SERPL-MCNC: 16 MG/DL (ref 5–25)
CA-I BLD-SCNC: 1.01 MMOL/L (ref 1.12–1.32)
CA-I BLD-SCNC: 1.09 MMOL/L (ref 1.12–1.32)
CA-I BLD-SCNC: 1.1 MMOL/L (ref 1.12–1.32)
CA-I BLD-SCNC: 1.3 MMOL/L (ref 1.12–1.32)
CA-I BLD-SCNC: 1.32 MMOL/L (ref 1.12–1.32)
CA-I BLD-SCNC: 1.36 MMOL/L (ref 1.12–1.32)
CALCIUM SERPL-MCNC: 8.7 MG/DL (ref 8.4–10.2)
CHLORIDE SERPL-SCNC: 110 MMOL/L (ref 96–108)
CO2 SERPL-SCNC: 22 MMOL/L (ref 21–32)
CREAT SERPL-MCNC: 0.98 MG/DL (ref 0.6–1.3)
CROSSMATCH: NORMAL
FIO2 GAS DIL.REBREATH: 50 L
GFR SERPL CREATININE-BSD FRML MDRD: 84 ML/MIN/1.73SQ M
GLUCOSE SERPL-MCNC: 106 MG/DL (ref 65–140)
GLUCOSE SERPL-MCNC: 124 MG/DL (ref 65–140)
GLUCOSE SERPL-MCNC: 138 MG/DL (ref 65–140)
GLUCOSE SERPL-MCNC: 143 MG/DL (ref 65–140)
GLUCOSE SERPL-MCNC: 147 MG/DL (ref 65–140)
GLUCOSE SERPL-MCNC: 155 MG/DL (ref 65–140)
GLUCOSE SERPL-MCNC: 159 MG/DL (ref 65–140)
GLUCOSE SERPL-MCNC: 162 MG/DL (ref 65–140)
GLUCOSE SERPL-MCNC: 164 MG/DL (ref 65–140)
GLUCOSE SERPL-MCNC: 164 MG/DL (ref 65–140)
GLUCOSE SERPL-MCNC: 169 MG/DL (ref 65–140)
GLUCOSE SERPL-MCNC: 179 MG/DL (ref 65–140)
GLUCOSE SERPL-MCNC: 187 MG/DL (ref 65–140)
GLUCOSE SERPL-MCNC: 78 MG/DL (ref 65–140)
HCO3 BLDA-SCNC: 20.8 MMOL/L (ref 22–28)
HCO3 BLDA-SCNC: 22.9 MMOL/L (ref 22–28)
HCO3 BLDA-SCNC: 24 MMOL/L (ref 22–28)
HCO3 BLDA-SCNC: 24 MMOL/L (ref 24–30)
HCO3 BLDA-SCNC: 25.1 MMOL/L (ref 22–28)
HCO3 BLDA-SCNC: 25.2 MMOL/L (ref 24–30)
HCT VFR BLD AUTO: 28.2 % (ref 36.5–49.3)
HCT VFR BLD AUTO: 29.4 % (ref 36.5–49.3)
HCT VFR BLD CALC: 21 % (ref 36.5–49.3)
HCT VFR BLD CALC: 23 % (ref 36.5–49.3)
HCT VFR BLD CALC: 24 % (ref 36.5–49.3)
HCT VFR BLD CALC: 27 % (ref 36.5–49.3)
HCT VFR BLD CALC: 31 % (ref 36.5–49.3)
HCT VFR BLD CALC: 33 % (ref 36.5–49.3)
HGB BLD-MCNC: 9.6 G/DL (ref 12–17)
HGB BLD-MCNC: 9.9 G/DL (ref 12–17)
HGB BLDA-MCNC: 10.5 G/DL (ref 12–17)
HGB BLDA-MCNC: 11.2 G/DL (ref 12–17)
HGB BLDA-MCNC: 7.1 G/DL (ref 12–17)
HGB BLDA-MCNC: 7.8 G/DL (ref 12–17)
HGB BLDA-MCNC: 8.2 G/DL (ref 12–17)
HGB BLDA-MCNC: 9.2 G/DL (ref 12–17)
KCT BLD-ACNC: 135 SEC (ref 89–137)
KCT BLD-ACNC: 147 SEC (ref 89–137)
KCT BLD-ACNC: 482 SEC (ref 89–137)
KCT BLD-ACNC: 546 SEC (ref 89–137)
KCT BLD-ACNC: 584 SEC (ref 89–137)
PCO2 BLD: 22 MMOL/L (ref 21–32)
PCO2 BLD: 24 MMOL/L (ref 21–32)
PCO2 BLD: 25 MMOL/L (ref 21–32)
PCO2 BLD: 26 MMOL/L (ref 21–32)
PCO2 BLD: 26 MMOL/L (ref 21–32)
PCO2 BLD: 27 MMOL/L (ref 21–32)
PCO2 BLD: 39.2 MM HG (ref 36–44)
PCO2 BLD: 39.9 MM HG (ref 36–44)
PCO2 BLD: 42.4 MM HG (ref 36–44)
PCO2 BLD: 43.2 MM HG (ref 42–50)
PCO2 BLD: 49 MM HG (ref 42–50)
PCO2 BLD: 60.4 MM HG (ref 36–44)
PH BLD: 7.21 [PH] (ref 7.35–7.45)
PH BLD: 7.32 [PH] (ref 7.3–7.4)
PH BLD: 7.33 [PH] (ref 7.35–7.45)
PH BLD: 7.35 [PH] (ref 7.3–7.4)
PH BLD: 7.37 [PH] (ref 7.35–7.45)
PH BLD: 7.38 [PH] (ref 7.35–7.45)
PLATELET # BLD AUTO: 183 THOUSANDS/UL (ref 149–390)
PLATELET # BLD AUTO: 218 THOUSANDS/UL (ref 149–390)
PMV BLD AUTO: 10.2 FL (ref 8.9–12.7)
PMV BLD AUTO: 10.5 FL (ref 8.9–12.7)
PO2 BLD: 101 MM HG (ref 75–129)
PO2 BLD: 153 MM HG (ref 75–129)
PO2 BLD: 249 MM HG (ref 75–129)
PO2 BLD: 261 MM HG (ref 75–129)
PO2 BLD: 35 MM HG (ref 35–45)
PO2 BLD: 42 MM HG (ref 35–45)
POTASSIUM BLD-SCNC: 4.1 MMOL/L (ref 3.5–5.3)
POTASSIUM BLD-SCNC: 4.3 MMOL/L (ref 3.5–5.3)
POTASSIUM BLD-SCNC: 4.4 MMOL/L (ref 3.5–5.3)
POTASSIUM BLD-SCNC: 5.6 MMOL/L (ref 3.5–5.3)
POTASSIUM BLD-SCNC: 5.8 MMOL/L (ref 3.5–5.3)
POTASSIUM BLD-SCNC: 6 MMOL/L (ref 3.5–5.3)
POTASSIUM SERPL-SCNC: 3.5 MMOL/L (ref 3.5–5.3)
POTASSIUM SERPL-SCNC: 4.2 MMOL/L (ref 3.5–5.3)
POTASSIUM SERPL-SCNC: 4.7 MMOL/L (ref 3.5–5.3)
SAO2 % BLD FROM PO2: 100 % (ref 60–85)
SAO2 % BLD FROM PO2: 100 % (ref 60–85)
SAO2 % BLD FROM PO2: 65 % (ref 60–85)
SAO2 % BLD FROM PO2: 73 % (ref 60–85)
SAO2 % BLD FROM PO2: 97 % (ref 60–85)
SAO2 % BLD FROM PO2: 99 % (ref 60–85)
SODIUM BLD-SCNC: 134 MMOL/L (ref 136–145)
SODIUM BLD-SCNC: 135 MMOL/L (ref 136–145)
SODIUM BLD-SCNC: 136 MMOL/L (ref 136–145)
SODIUM BLD-SCNC: 140 MMOL/L (ref 136–145)
SODIUM BLD-SCNC: 141 MMOL/L (ref 136–145)
SODIUM BLD-SCNC: 142 MMOL/L (ref 136–145)
SODIUM SERPL-SCNC: 140 MMOL/L (ref 135–147)
SPECIMEN SOURCE: ABNORMAL
SPECIMEN SOURCE: NORMAL
UNIT DISPENSE STATUS: NORMAL
UNIT PRODUCT CODE: NORMAL
UNIT PRODUCT VOLUME: 350 ML
UNIT RH: NORMAL

## 2024-09-11 PROCEDURE — 85018 HEMOGLOBIN: CPT | Performed by: PHYSICIAN ASSISTANT

## 2024-09-11 PROCEDURE — 30233N0 TRANSFUSION OF AUTOLOGOUS RED BLOOD CELLS INTO PERIPHERAL VEIN, PERCUTANEOUS APPROACH: ICD-10-PCS | Performed by: ANESTHESIOLOGY

## 2024-09-11 PROCEDURE — 85347 COAGULATION TIME ACTIVATED: CPT

## 2024-09-11 PROCEDURE — 82948 REAGENT STRIP/BLOOD GLUCOSE: CPT

## 2024-09-11 PROCEDURE — 82330 ASSAY OF CALCIUM: CPT

## 2024-09-11 PROCEDURE — 94760 N-INVAS EAR/PLS OXIMETRY 1: CPT

## 2024-09-11 PROCEDURE — 94002 VENT MGMT INPAT INIT DAY: CPT

## 2024-09-11 PROCEDURE — 021009W BYPASS CORONARY ARTERY, ONE ARTERY FROM AORTA WITH AUTOLOGOUS VENOUS TISSUE, OPEN APPROACH: ICD-10-PCS | Performed by: THORACIC SURGERY (CARDIOTHORACIC VASCULAR SURGERY)

## 2024-09-11 PROCEDURE — 06BP0ZZ EXCISION OF RIGHT SAPHENOUS VEIN, OPEN APPROACH: ICD-10-PCS | Performed by: THORACIC SURGERY (CARDIOTHORACIC VASCULAR SURGERY)

## 2024-09-11 PROCEDURE — 02100Z9 BYPASS CORONARY ARTERY, ONE ARTERY FROM LEFT INTERNAL MAMMARY, OPEN APPROACH: ICD-10-PCS | Performed by: THORACIC SURGERY (CARDIOTHORACIC VASCULAR SURGERY)

## 2024-09-11 PROCEDURE — 33533 CABG ARTERIAL SINGLE: CPT | Performed by: PHYSICIAN ASSISTANT

## 2024-09-11 PROCEDURE — 85049 AUTOMATED PLATELET COUNT: CPT | Performed by: PHYSICIAN ASSISTANT

## 2024-09-11 PROCEDURE — 33508 ENDOSCOPIC VEIN HARVEST: CPT | Performed by: THORACIC SURGERY (CARDIOTHORACIC VASCULAR SURGERY)

## 2024-09-11 PROCEDURE — 80048 BASIC METABOLIC PNL TOTAL CA: CPT | Performed by: PHYSICIAN ASSISTANT

## 2024-09-11 PROCEDURE — 82803 BLOOD GASES ANY COMBINATION: CPT

## 2024-09-11 PROCEDURE — 02H633Z INSERTION OF INFUSION DEVICE INTO RIGHT ATRIUM, PERCUTANEOUS APPROACH: ICD-10-PCS | Performed by: ANESTHESIOLOGY

## 2024-09-11 PROCEDURE — 82947 ASSAY GLUCOSE BLOOD QUANT: CPT

## 2024-09-11 PROCEDURE — 93005 ELECTROCARDIOGRAM TRACING: CPT

## 2024-09-11 PROCEDURE — 84295 ASSAY OF SERUM SODIUM: CPT

## 2024-09-11 PROCEDURE — 99291 CRITICAL CARE FIRST HOUR: CPT | Performed by: ANESTHESIOLOGY

## 2024-09-11 PROCEDURE — 71045 X-RAY EXAM CHEST 1 VIEW: CPT

## 2024-09-11 PROCEDURE — 33517 CABG ARTERY-VEIN SINGLE: CPT | Performed by: PHYSICIAN ASSISTANT

## 2024-09-11 PROCEDURE — 84132 ASSAY OF SERUM POTASSIUM: CPT

## 2024-09-11 PROCEDURE — 84132 ASSAY OF SERUM POTASSIUM: CPT | Performed by: PHYSICIAN ASSISTANT

## 2024-09-11 PROCEDURE — 85049 AUTOMATED PLATELET COUNT: CPT | Performed by: THORACIC SURGERY (CARDIOTHORACIC VASCULAR SURGERY)

## 2024-09-11 PROCEDURE — 5A1223Z PERFORMANCE OF CARDIAC PACING, CONTINUOUS: ICD-10-PCS | Performed by: THORACIC SURGERY (CARDIOTHORACIC VASCULAR SURGERY)

## 2024-09-11 PROCEDURE — 85014 HEMATOCRIT: CPT

## 2024-09-11 PROCEDURE — 33508 ENDOSCOPIC VEIN HARVEST: CPT | Performed by: PHYSICIAN ASSISTANT

## 2024-09-11 PROCEDURE — 33533 CABG ARTERIAL SINGLE: CPT | Performed by: THORACIC SURGERY (CARDIOTHORACIC VASCULAR SURGERY)

## 2024-09-11 PROCEDURE — 93355 ECHO TRANSESOPHAGEAL (TEE): CPT

## 2024-09-11 PROCEDURE — 85014 HEMATOCRIT: CPT | Performed by: PHYSICIAN ASSISTANT

## 2024-09-11 PROCEDURE — 5A1221Z PERFORMANCE OF CARDIAC OUTPUT, CONTINUOUS: ICD-10-PCS | Performed by: THORACIC SURGERY (CARDIOTHORACIC VASCULAR SURGERY)

## 2024-09-11 PROCEDURE — 33517 CABG ARTERY-VEIN SINGLE: CPT | Performed by: THORACIC SURGERY (CARDIOTHORACIC VASCULAR SURGERY)

## 2024-09-11 PROCEDURE — A7041 WATER SEAL DRAIN CONTAINER: HCPCS | Performed by: THORACIC SURGERY (CARDIOTHORACIC VASCULAR SURGERY)

## 2024-09-11 DEVICE — MARKER CORONARY BYPASS VOSS GRAFT: Type: IMPLANTABLE DEVICE | Site: HEART | Status: FUNCTIONAL

## 2024-09-11 RX ORDER — VASOPRESSIN 20 U/ML
INJECTION PARENTERAL AS NEEDED
Status: DISCONTINUED | OUTPATIENT
Start: 2024-09-11 | End: 2024-09-11

## 2024-09-11 RX ORDER — VECURONIUM BROMIDE 1 MG/ML
INJECTION, POWDER, LYOPHILIZED, FOR SOLUTION INTRAVENOUS AS NEEDED
Status: DISCONTINUED | OUTPATIENT
Start: 2024-09-11 | End: 2024-09-11

## 2024-09-11 RX ORDER — SODIUM CHLORIDE 9 MG/ML
INJECTION, SOLUTION INTRAVENOUS CONTINUOUS PRN
Status: DISCONTINUED | OUTPATIENT
Start: 2024-09-11 | End: 2024-09-11

## 2024-09-11 RX ORDER — KETAMINE HCL IN NACL, ISO-OSM 100MG/10ML
SYRINGE (ML) INJECTION AS NEEDED
Status: DISCONTINUED | OUTPATIENT
Start: 2024-09-11 | End: 2024-09-11

## 2024-09-11 RX ORDER — ALBUMIN, HUMAN INJ 5% 5 %
SOLUTION INTRAVENOUS CONTINUOUS PRN
Status: DISCONTINUED | OUTPATIENT
Start: 2024-09-11 | End: 2024-09-11

## 2024-09-11 RX ORDER — PROTAMINE SULFATE 10 MG/ML
INJECTION, SOLUTION INTRAVENOUS AS NEEDED
Status: DISCONTINUED | OUTPATIENT
Start: 2024-09-11 | End: 2024-09-11

## 2024-09-11 RX ORDER — HEPARIN SODIUM 1000 [USP'U]/ML
10000 INJECTION, SOLUTION INTRAVENOUS; SUBCUTANEOUS ONCE
Status: COMPLETED | OUTPATIENT
Start: 2024-09-11 | End: 2024-09-11

## 2024-09-11 RX ORDER — ONDANSETRON 2 MG/ML
4 INJECTION INTRAMUSCULAR; INTRAVENOUS EVERY 6 HOURS PRN
Status: DISCONTINUED | OUTPATIENT
Start: 2024-09-11 | End: 2024-09-15 | Stop reason: HOSPADM

## 2024-09-11 RX ORDER — PANTOPRAZOLE SODIUM 40 MG/1
40 TABLET, DELAYED RELEASE ORAL
Status: DISCONTINUED | OUTPATIENT
Start: 2024-09-11 | End: 2024-09-15 | Stop reason: HOSPADM

## 2024-09-11 RX ORDER — MANNITOL 250 MG/ML
INJECTION, SOLUTION INTRAVENOUS AS NEEDED
Status: DISCONTINUED | OUTPATIENT
Start: 2024-09-11 | End: 2024-09-11

## 2024-09-11 RX ORDER — SODIUM CHLORIDE, SODIUM GLUCONATE, SODIUM ACETATE, POTASSIUM CHLORIDE, MAGNESIUM CHLORIDE, SODIUM PHOSPHATE, DIBASIC, AND POTASSIUM PHOSPHATE .53; .5; .37; .037; .03; .012; .00082 G/100ML; G/100ML; G/100ML; G/100ML; G/100ML; G/100ML; G/100ML
INJECTION, SOLUTION INTRAVENOUS AS NEEDED
Status: DISCONTINUED | OUTPATIENT
Start: 2024-09-11 | End: 2024-09-11

## 2024-09-11 RX ORDER — CHLORHEXIDINE GLUCONATE ORAL RINSE 1.2 MG/ML
15 SOLUTION DENTAL 2 TIMES DAILY
Status: DISCONTINUED | OUTPATIENT
Start: 2024-09-11 | End: 2024-09-15 | Stop reason: HOSPADM

## 2024-09-11 RX ORDER — MAGNESIUM HYDROXIDE 1200 MG/15ML
LIQUID ORAL AS NEEDED
Status: DISCONTINUED | OUTPATIENT
Start: 2024-09-11 | End: 2024-09-11 | Stop reason: HOSPADM

## 2024-09-11 RX ORDER — DEXMEDETOMIDINE HYDROCHLORIDE 4 UG/ML
.1-.7 INJECTION, SOLUTION INTRAVENOUS
Status: DISCONTINUED | OUTPATIENT
Start: 2024-09-11 | End: 2024-09-12

## 2024-09-11 RX ORDER — ACETAMINOPHEN 650 MG/1
650 SUPPOSITORY RECTAL EVERY 4 HOURS PRN
Status: DISCONTINUED | OUTPATIENT
Start: 2024-09-11 | End: 2024-09-15 | Stop reason: HOSPADM

## 2024-09-11 RX ORDER — LIDOCAINE HYDROCHLORIDE 20 MG/ML
INJECTION, SOLUTION EPIDURAL; INFILTRATION; INTRACAUDAL; PERINEURAL AS NEEDED
Status: DISCONTINUED | OUTPATIENT
Start: 2024-09-11 | End: 2024-09-11

## 2024-09-11 RX ORDER — CALCIUM GLUCONATE 20 MG/ML
2 INJECTION, SOLUTION INTRAVENOUS ONCE AS NEEDED
Status: DISCONTINUED | OUTPATIENT
Start: 2024-09-11 | End: 2024-09-12

## 2024-09-11 RX ORDER — SODIUM CHLORIDE 450 MG/100ML
20 INJECTION, SOLUTION INTRAVENOUS CONTINUOUS
Status: DISCONTINUED | OUTPATIENT
Start: 2024-09-11 | End: 2024-09-15 | Stop reason: HOSPADM

## 2024-09-11 RX ORDER — POLYETHYLENE GLYCOL 3350 17 G/17G
17 POWDER, FOR SOLUTION ORAL DAILY
Status: DISCONTINUED | OUTPATIENT
Start: 2024-09-11 | End: 2024-09-15 | Stop reason: HOSPADM

## 2024-09-11 RX ORDER — ACETAMINOPHEN 325 MG/1
975 TABLET ORAL
Status: DISCONTINUED | OUTPATIENT
Start: 2024-09-11 | End: 2024-09-15 | Stop reason: HOSPADM

## 2024-09-11 RX ORDER — OXYCODONE HYDROCHLORIDE 5 MG/1
5 TABLET ORAL EVERY 4 HOURS PRN
Status: DISCONTINUED | OUTPATIENT
Start: 2024-09-11 | End: 2024-09-15 | Stop reason: HOSPADM

## 2024-09-11 RX ORDER — ASPIRIN 325 MG
325 TABLET ORAL DAILY
Status: DISCONTINUED | OUTPATIENT
Start: 2024-09-11 | End: 2024-09-15 | Stop reason: HOSPADM

## 2024-09-11 RX ORDER — FENTANYL CITRATE 50 UG/ML
INJECTION, SOLUTION INTRAMUSCULAR; INTRAVENOUS AS NEEDED
Status: DISCONTINUED | OUTPATIENT
Start: 2024-09-11 | End: 2024-09-11

## 2024-09-11 RX ORDER — CHLORHEXIDINE GLUCONATE ORAL RINSE 1.2 MG/ML
15 SOLUTION DENTAL EVERY 12 HOURS SCHEDULED
Status: DISCONTINUED | OUTPATIENT
Start: 2024-09-11 | End: 2024-09-11 | Stop reason: SDUPTHER

## 2024-09-11 RX ORDER — AMIODARONE HYDROCHLORIDE 200 MG/1
200 TABLET ORAL EVERY 8 HOURS SCHEDULED
Status: DISCONTINUED | OUTPATIENT
Start: 2024-09-11 | End: 2024-09-15 | Stop reason: HOSPADM

## 2024-09-11 RX ORDER — HEPARIN SODIUM 5000 [USP'U]/ML
5000 INJECTION, SOLUTION INTRAVENOUS; SUBCUTANEOUS EVERY 8 HOURS SCHEDULED
Status: DISCONTINUED | OUTPATIENT
Start: 2024-09-12 | End: 2024-09-12

## 2024-09-11 RX ORDER — POTASSIUM CHLORIDE 29.8 MG/ML
40 INJECTION INTRAVENOUS ONCE AS NEEDED
Status: COMPLETED | OUTPATIENT
Start: 2024-09-11 | End: 2024-09-11

## 2024-09-11 RX ORDER — POTASSIUM CHLORIDE 29.8 MG/ML
40 INJECTION INTRAVENOUS ONCE AS NEEDED
Status: DISCONTINUED | OUTPATIENT
Start: 2024-09-11 | End: 2024-09-12

## 2024-09-11 RX ORDER — FENTANYL CITRATE 50 UG/ML
50 INJECTION, SOLUTION INTRAMUSCULAR; INTRAVENOUS
Status: DISCONTINUED | OUTPATIENT
Start: 2024-09-11 | End: 2024-09-12

## 2024-09-11 RX ORDER — ATORVASTATIN CALCIUM 80 MG/1
80 TABLET, FILM COATED ORAL
Status: DISCONTINUED | OUTPATIENT
Start: 2024-09-11 | End: 2024-09-15 | Stop reason: HOSPADM

## 2024-09-11 RX ORDER — MAGNESIUM SULFATE HEPTAHYDRATE 40 MG/ML
2 INJECTION, SOLUTION INTRAVENOUS ONCE
Status: COMPLETED | OUTPATIENT
Start: 2024-09-11 | End: 2024-09-11

## 2024-09-11 RX ORDER — PHENYLEPHRINE HCL IN 0.9% NACL 1 MG/10 ML
200-2000 SYRINGE (ML) INTRAVENOUS ONCE
Status: DISCONTINUED | OUTPATIENT
Start: 2024-09-11 | End: 2024-09-11

## 2024-09-11 RX ORDER — VANCOMYCIN HYDROCHLORIDE 1 G/20ML
INJECTION, POWDER, LYOPHILIZED, FOR SOLUTION INTRAVENOUS AS NEEDED
Status: DISCONTINUED | OUTPATIENT
Start: 2024-09-11 | End: 2024-09-11 | Stop reason: HOSPADM

## 2024-09-11 RX ORDER — LIDOCAINE HYDROCHLORIDE 10 MG/ML
INJECTION, SOLUTION EPIDURAL; INFILTRATION; INTRACAUDAL; PERINEURAL
Status: COMPLETED | OUTPATIENT
Start: 2024-09-11 | End: 2024-09-11

## 2024-09-11 RX ORDER — CALCIUM CHLORIDE 100 MG/ML
INJECTION INTRAVENOUS; INTRAVENTRICULAR AS NEEDED
Status: DISCONTINUED | OUTPATIENT
Start: 2024-09-11 | End: 2024-09-11

## 2024-09-11 RX ORDER — NEOSTIGMINE METHYLSULFATE 1 MG/ML
INJECTION INTRAVENOUS AS NEEDED
Status: DISCONTINUED | OUTPATIENT
Start: 2024-09-11 | End: 2024-09-11

## 2024-09-11 RX ORDER — CEFAZOLIN SODIUM 2 G/50ML
2000 SOLUTION INTRAVENOUS EVERY 8 HOURS
Status: COMPLETED | OUTPATIENT
Start: 2024-09-11 | End: 2024-09-12

## 2024-09-11 RX ORDER — LIDOCAINE HCL/PF 100 MG/5ML
SYRINGE (ML) INJECTION AS NEEDED
Status: DISCONTINUED | OUTPATIENT
Start: 2024-09-11 | End: 2024-09-11

## 2024-09-11 RX ORDER — GLYCOPYRROLATE 0.2 MG/ML
INJECTION INTRAMUSCULAR; INTRAVENOUS AS NEEDED
Status: DISCONTINUED | OUTPATIENT
Start: 2024-09-11 | End: 2024-09-11

## 2024-09-11 RX ORDER — CEFAZOLIN SODIUM 2 G/50ML
SOLUTION INTRAVENOUS AS NEEDED
Status: DISCONTINUED | OUTPATIENT
Start: 2024-09-11 | End: 2024-09-11

## 2024-09-11 RX ORDER — MUPIROCIN 20 MG/G
1 OINTMENT TOPICAL ONCE
Status: DISCONTINUED | OUTPATIENT
Start: 2024-09-11 | End: 2024-09-11

## 2024-09-11 RX ORDER — MIDAZOLAM HYDROCHLORIDE 2 MG/2ML
INJECTION, SOLUTION INTRAMUSCULAR; INTRAVENOUS AS NEEDED
Status: DISCONTINUED | OUTPATIENT
Start: 2024-09-11 | End: 2024-09-11

## 2024-09-11 RX ORDER — FENTANYL CITRATE 50 UG/ML
50 INJECTION, SOLUTION INTRAMUSCULAR; INTRAVENOUS ONCE
Status: DISCONTINUED | OUTPATIENT
Start: 2024-09-11 | End: 2024-09-14

## 2024-09-11 RX ORDER — HYDROMORPHONE HCL/PF 1 MG/ML
0.5 SYRINGE (ML) INJECTION EVERY 2 HOUR PRN
Status: DISCONTINUED | OUTPATIENT
Start: 2024-09-11 | End: 2024-09-12

## 2024-09-11 RX ORDER — MAGNESIUM SULFATE 500 MG/ML
VIAL (ML) INJECTION AS NEEDED
Status: DISCONTINUED | OUTPATIENT
Start: 2024-09-11 | End: 2024-09-11

## 2024-09-11 RX ORDER — POTASSIUM CHLORIDE 14.9 MG/ML
20 INJECTION INTRAVENOUS ONCE AS NEEDED
Status: DISCONTINUED | OUTPATIENT
Start: 2024-09-11 | End: 2024-09-12

## 2024-09-11 RX ORDER — HEPARIN SODIUM 1000 [USP'U]/ML
400 INJECTION, SOLUTION INTRAVENOUS; SUBCUTANEOUS ONCE
Status: COMPLETED | OUTPATIENT
Start: 2024-09-11 | End: 2024-09-11

## 2024-09-11 RX ORDER — BISACODYL 10 MG
10 SUPPOSITORY, RECTAL RECTAL DAILY PRN
Status: DISCONTINUED | OUTPATIENT
Start: 2024-09-11 | End: 2024-09-15 | Stop reason: HOSPADM

## 2024-09-11 RX ORDER — AMIODARONE HYDROCHLORIDE 150 MG/3ML
INJECTION, SOLUTION INTRAVENOUS AS NEEDED
Status: DISCONTINUED | OUTPATIENT
Start: 2024-09-11 | End: 2024-09-11

## 2024-09-11 RX ADMIN — AMIODARONE HYDROCHLORIDE 150 MG: 50 INJECTION, SOLUTION INTRAVENOUS at 11:18

## 2024-09-11 RX ADMIN — Medication 1 EACH: at 09:06

## 2024-09-11 RX ADMIN — FENTANYL CITRATE 1000 MCG: 50 INJECTION INTRAMUSCULAR; INTRAVENOUS at 08:25

## 2024-09-11 RX ADMIN — SODIUM CHLORIDE: 0.9 INJECTION, SOLUTION INTRAVENOUS at 08:50

## 2024-09-11 RX ADMIN — CHLORHEXIDINE GLUCONATE 0.12% ORAL RINSE 15 ML: 1.2 LIQUID ORAL at 05:26

## 2024-09-11 RX ADMIN — AMIODARONE HYDROCHLORIDE 200 MG: 200 TABLET ORAL at 13:43

## 2024-09-11 RX ADMIN — HEPARIN SODIUM 19300 UNITS: 1000 INJECTION INTRAVENOUS; SUBCUTANEOUS at 09:59

## 2024-09-11 RX ADMIN — CEFAZOLIN SODIUM 2000 MG: 2 SOLUTION INTRAVENOUS at 18:23

## 2024-09-11 RX ADMIN — AMIODARONE HYDROCHLORIDE 200 MG: 200 TABLET ORAL at 22:22

## 2024-09-11 RX ADMIN — ALBUMIN (HUMAN): 12.5 INJECTION, SOLUTION INTRAVENOUS at 11:51

## 2024-09-11 RX ADMIN — HYDROMORPHONE HYDROCHLORIDE 0.5 MG: 1 INJECTION, SOLUTION INTRAMUSCULAR; INTRAVENOUS; SUBCUTANEOUS at 16:53

## 2024-09-11 RX ADMIN — AMINOCAPROIC ACID 5 G: 250 INJECTION, SOLUTION INTRAVENOUS at 08:50

## 2024-09-11 RX ADMIN — MUPIROCIN 1 APPLICATION: 20 OINTMENT TOPICAL at 20:03

## 2024-09-11 RX ADMIN — SODIUM CHLORIDE 20 ML/HR: 0.45 INJECTION, SOLUTION INTRAVENOUS at 13:00

## 2024-09-11 RX ADMIN — PHENYLEPHRINE HYDROCHLORIDE 25 MCG/MIN: 50 INJECTION INTRAVENOUS at 13:38

## 2024-09-11 RX ADMIN — HEPARIN SODIUM 5000 UNITS: 1000 INJECTION INTRAVENOUS; SUBCUTANEOUS at 09:48

## 2024-09-11 RX ADMIN — VECURONIUM BROMIDE 10 MG: 10 INJECTION, POWDER, LYOPHILIZED, FOR SOLUTION INTRAVENOUS at 08:25

## 2024-09-11 RX ADMIN — ASPIRIN 325 MG ORAL TABLET 325 MG: 325 PILL ORAL at 13:43

## 2024-09-11 RX ADMIN — CEFAZOLIN SODIUM 2000 MG: 2 SOLUTION INTRAVENOUS at 08:50

## 2024-09-11 RX ADMIN — MANNITOL 25 G: 12.5 INJECTION, SOLUTION INTRAVENOUS at 09:06

## 2024-09-11 RX ADMIN — FENTANYL CITRATE 250 MCG: 50 INJECTION INTRAMUSCULAR; INTRAVENOUS at 09:17

## 2024-09-11 RX ADMIN — MIDAZOLAM 4 MG: 1 INJECTION INTRAMUSCULAR; INTRAVENOUS at 08:14

## 2024-09-11 RX ADMIN — VECURONIUM BROMIDE 2 MG: 10 INJECTION, POWDER, LYOPHILIZED, FOR SOLUTION INTRAVENOUS at 10:34

## 2024-09-11 RX ADMIN — NEOSTIGMINE METHYLSULFATE 4 MG: 1 INJECTION INTRAVENOUS at 12:33

## 2024-09-11 RX ADMIN — HYDROMORPHONE HYDROCHLORIDE 0.5 MG: 1 INJECTION, SOLUTION INTRAMUSCULAR; INTRAVENOUS; SUBCUTANEOUS at 19:32

## 2024-09-11 RX ADMIN — ALBUMIN (HUMAN): 12.5 INJECTION, SOLUTION INTRAVENOUS at 11:43

## 2024-09-11 RX ADMIN — HEPARIN SODIUM 5000 UNITS: 1000 INJECTION INTRAVENOUS; SUBCUTANEOUS at 10:57

## 2024-09-11 RX ADMIN — VECURONIUM BROMIDE 2 MG: 10 INJECTION, POWDER, LYOPHILIZED, FOR SOLUTION INTRAVENOUS at 11:16

## 2024-09-11 RX ADMIN — PHENYLEPHRINE HYDROCHLORIDE 250 MCG: 10 INJECTION INTRAVENOUS at 10:34

## 2024-09-11 RX ADMIN — ACETAMINOPHEN 975 MG: 325 TABLET ORAL at 20:03

## 2024-09-11 RX ADMIN — AMINOCAPROIC ACID 1 G/HR: 250 INJECTION, SOLUTION INTRAVENOUS at 09:11

## 2024-09-11 RX ADMIN — DEXMEDETOMIDINE HYDROCHLORIDE 0.3 MCG/KG/HR: 100 INJECTION, SOLUTION, CONCENTRATE INTRAVENOUS at 09:29

## 2024-09-11 RX ADMIN — GLYCOPYRROLATE 0.4 MG: 0.2 INJECTION, SOLUTION INTRAMUSCULAR; INTRAVENOUS at 12:33

## 2024-09-11 RX ADMIN — MAGNESIUM SULFATE HEPTAHYDRATE 2 G: 40 INJECTION, SOLUTION INTRAVENOUS at 12:50

## 2024-09-11 RX ADMIN — MIDAZOLAM 2 MG: 1 INJECTION INTRAMUSCULAR; INTRAVENOUS at 10:34

## 2024-09-11 RX ADMIN — AMIODARONE HYDROCHLORIDE 150 MG: 50 INJECTION, SOLUTION INTRAVENOUS at 11:22

## 2024-09-11 RX ADMIN — SODIUM CHLORIDE: 9 INJECTION, SOLUTION INTRAVENOUS at 08:10

## 2024-09-11 RX ADMIN — SODIUM BICARBONATE 50 MEQ: 84 INJECTION, SOLUTION INTRAVENOUS at 09:06

## 2024-09-11 RX ADMIN — DEXMEDETOMIDINE HYDROCHLORIDE 0.2 MCG/KG/HR: 4 INJECTION, SOLUTION INTRAVENOUS at 13:04

## 2024-09-11 RX ADMIN — PHENYLEPHRINE HYDROCHLORIDE 250 MCG: 10 INJECTION INTRAVENOUS at 10:52

## 2024-09-11 RX ADMIN — SODIUM CHLORIDE 2 ML/HR: 9 INJECTION, SOLUTION INTRAVENOUS at 10:14

## 2024-09-11 RX ADMIN — LIDOCAINE HYDROCHLORIDE 100 MG: 20 INJECTION INTRAVENOUS at 11:21

## 2024-09-11 RX ADMIN — Medication 50 MG: at 08:25

## 2024-09-11 RX ADMIN — Medication 600 ML: at 10:36

## 2024-09-11 RX ADMIN — ALBUMIN (HUMAN): 12.5 INJECTION, SOLUTION INTRAVENOUS at 12:12

## 2024-09-11 RX ADMIN — OXYCODONE HYDROCHLORIDE 5 MG: 5 TABLET ORAL at 22:22

## 2024-09-11 RX ADMIN — NICARDIPINE HYDROCHLORIDE 2 MG/HR: 2.5 INJECTION, SOLUTION INTRAVENOUS at 09:33

## 2024-09-11 RX ADMIN — PROTAMINE SULFATE 200 MG: 10 INJECTION, SOLUTION INTRAVENOUS at 11:29

## 2024-09-11 RX ADMIN — PHENYLEPHRINE HYDROCHLORIDE 250 MCG: 10 INJECTION INTRAVENOUS at 11:19

## 2024-09-11 RX ADMIN — CALCIUM CHLORIDE 1 G: 100 INJECTION INTRAVENOUS; INTRAVENTRICULAR at 11:26

## 2024-09-11 RX ADMIN — MIDAZOLAM 4 MG: 1 INJECTION INTRAMUSCULAR; INTRAVENOUS at 11:16

## 2024-09-11 RX ADMIN — Medication 50 MG: at 11:16

## 2024-09-11 RX ADMIN — LIDOCAINE HYDROCHLORIDE 100 MG: 20 INJECTION, SOLUTION EPIDURAL; INFILTRATION; INTRACAUDAL; PERINEURAL at 08:25

## 2024-09-11 RX ADMIN — MANNITOL 12.5 G: 12.5 INJECTION, SOLUTION INTRAVENOUS at 10:40

## 2024-09-11 RX ADMIN — PHENYLEPHRINE HYDROCHLORIDE 250 MCG: 10 INJECTION INTRAVENOUS at 10:48

## 2024-09-11 RX ADMIN — VASOPRESSIN 1 UNITS: 20 INJECTION INTRAVENOUS at 12:09

## 2024-09-11 RX ADMIN — SODIUM CHLORIDE, SODIUM GLUCONATE, SODIUM ACETATE, POTASSIUM CHLORIDE, MAGNESIUM CHLORIDE, SODIUM PHOSPHATE, DIBASIC, AND POTASSIUM PHOSPHATE 500 ML: .53; .5; .37; .037; .03; .012; .00082 INJECTION, SOLUTION INTRAVENOUS at 10:33

## 2024-09-11 RX ADMIN — MAGNESIUM SULFATE HEPTAHYDRATE 2 G: 500 INJECTION, SOLUTION INTRAMUSCULAR; INTRAVENOUS at 11:09

## 2024-09-11 RX ADMIN — HEPARIN SODIUM 5000 UNITS: 1000 INJECTION INTRAVENOUS; SUBCUTANEOUS at 09:06

## 2024-09-11 RX ADMIN — NOREPINEPHRINE BITARTRATE 2 MCG/MIN: 1 SOLUTION INTRAVENOUS at 11:55

## 2024-09-11 RX ADMIN — ALBUMIN (HUMAN): 12.5 INJECTION, SOLUTION INTRAVENOUS at 12:03

## 2024-09-11 RX ADMIN — METOPROLOL SUCCINATE 12.5 MG: 25 TABLET, EXTENDED RELEASE ORAL at 05:26

## 2024-09-11 RX ADMIN — POTASSIUM CHLORIDE 40 MEQ: 29.8 INJECTION, SOLUTION INTRAVENOUS at 16:45

## 2024-09-11 RX ADMIN — LIDOCAINE HYDROCHLORIDE 0.5 ML: 10 INJECTION, SOLUTION EPIDURAL; INFILTRATION; INTRACAUDAL; PERINEURAL at 08:17

## 2024-09-11 NOTE — ANESTHESIA PROCEDURE NOTES
"Arterial Line Insertion    Performed by: Jonnathan Atkins MD  Authorized by: Jonnathan Atkins MD  Consent: Verbal consent obtained. Written consent obtained.  Risks and benefits: risks, benefits and alternatives were discussed  Consent given by: patient  Patient understanding: patient states understanding of the procedure being performed  Patient consent: the patient's understanding of the procedure matches consent given  Required items: required blood products, implants, devices, and special equipment available  Patient identity confirmed: arm band, provided demographic data and hospital-assigned identification number  Time out: Immediately prior to procedure a \"time out\" was called to verify the correct patient, procedure, equipment, support staff and site/side marked as required.  Preparation: Patient was prepped and draped in the usual sterile fashion.  Indications: hemodynamic monitoring  Orientation:  Left  Location: radial arterylidocaine (PF) (XYLOCAINE-MPF) 1 % - Infiltration   0.5 mL - 9/11/2024 8:17:00 AM  Sedation:  Patient sedated: yes  Sedation type: anxiolysis  Sedatives: midazolam and see MAR for details  Vitals: Vital signs were monitored during sedation.    Procedure Details:  Needle gauge: 20  Seldinger technique: Seldinger technique used  Number of attempts: 2    Post-procedure:  Post-procedure: dressing applied  Waveform: good waveform  Post-procedure CNS: unchanged  Patient tolerance: Patient tolerated the procedure well with no immediate complications and patient tolerated the procedure well with no immediate complications          "

## 2024-09-11 NOTE — UTILIZATION REVIEW
Continued Stay Review  Date: 9/11/24                       Current Patient Class: Inpatient    Current Level of Care: Med Surg    HPI:   57 y/o Latvian-speaking male with PMHx HTN, HLD, ischemic stroke, DM2 - initially presented to [facility] ED and admitted Inpatient on 9/2/24 2nd typical cardiac chest pain with elevated troponin + concern for NSTEMI.     9/4/24 CARDIAC CATH - severe multivessel diffuse CAD   Mid RCA 80% segmental stenosis   Prox to mid LAD diffuse 80-90% stenosis might have decent landing LIMA target distally  Diagonal 1 has 80% but smaller caliber   Lcx/Om1 has 30% stenosis     Transferred to [facility] on 9/5/24 2nd mutivessel CAD for CABG evaluation    9/10/24 CARDIAC SURGERY:  Assessment:  58 y/ male with significant 2V CAD, NSTEMI     Plan: Pre-op work-up   CT chest satisfactory  Vein is adequate for bypass  No significant carotid disease  Preserved LVEF with no valve issues  Risks, benefits and alternatives discussed with the patient using  and patient is agreeable to proceed with CABG  Plan for CABG x2 on Wednesday 9/11/24 9/11/24:  CAD, multiple vessel  Initially presented to [facility] for chest pain, found to have elevated troponins without EKG changes.  Underwent cardiac catheterization on 9/4 which showed multivessel disease.  Transferred to [facility] for CT surgery evaluation/CABG.  Continue aspirin, atorvastatin, metoprolol  Continue IV heparin  CT surgery plans for CABG noted    HTN (hypertension)  Blood pressures reviewed, acceptable  Continue amlodipine 5 mg p.o. daily  Lisinopril on hold perioperatively  Monitor blood pressures, avoid hypotension    9/11/24 OP REPORT:  PREOPERATIVE + POSTOPERATIVE DIAGNOSIS:  Multivessel coronary artery disease, s/p Acute NSTEMI  NYHA Class: 2  CCS Class: 3  PROCEDURE: Coronary artery bypass grafting x 2 with left internal mammary artery to left anterior descending, saphenous vein graft to right posterior descending  artery; MARY.  ANESTHESIA: General endotracheal anesthesia with transesophageal echocardiogram guidance,  CARDIOPULMONARY BYPASS TIME: 55 minutes.    CROSSCLAMP TIME: 49 minutes.   PACKS/TUBES/DRAINS: Chest tubes x 3.   ESTIMATED BLOOD LOSS: 250 mL        Vital Signs (last 3 days)       Date/Time Temp Pulse Resp BP MAP (mmHg) SpO2 O2 Device Patient Position - Orthostatic VS Janie Coma Scale Score Pain    09/11/24 0751 -- -- -- -- -- -- -- -- -- No Pain    09/11/24 05:09:55 -- 70 -- 125/77 93 100 % -- -- -- --    09/11/24 0352 -- 72 -- -- -- 99 % -- -- -- --    09/11/24 02:39:28 97.9 °F (36.6 °C) 69 -- 126/82 97 99 % -- -- -- --    09/10/24 21:58:46 98.2 °F (36.8 °C) 77 -- 139/87 104 98 % -- -- -- --    09/10/24 2157 98.2 °F (36.8 °C) -- -- 139/87 104 -- -- -- -- --    09/10/24 2000 -- -- -- -- -- -- -- -- 15 No Pain    09/10/24 1858 98.1 °F (36.7 °C) 74 -- 131/77 95 98 % -- -- -- --    09/10/24 15:52:35 98.5 °F (36.9 °C) 74 -- 133/80 98 98 % -- -- -- --    09/10/24 15:49:09 -- 75 -- 133/80 98 100 % -- -- -- --    09/10/24 11:52:30 98.2 °F (36.8 °C) 76 -- 122/76 91 97 % -- -- -- --    09/10/24 0900 -- -- -- -- -- 100 % None (Room air) -- 15 No Pain    09/10/24 07:13:41 97.9 °F (36.6 °C) 70 -- 131/80 97 98 % -- -- -- --    09/10/24 02:59:06 97.6 °F (36.4 °C) 74 17 134/74 94 98 % -- -- -- --    09/09/24 22:12:59 98.3 °F (36.8 °C) 74 -- 135/80 98 100 % -- -- -- --    09/09/24 2000 -- -- -- -- -- -- -- -- 15 No Pain    09/09/24 18:51:16 98.1 °F (36.7 °C) 80 -- 132/80 97 100 % -- -- -- --    09/09/24 14:41:50 98 °F (36.7 °C) 81 -- 115/78 90 97 % -- -- -- --    09/09/24 1300 -- -- -- -- -- 99 % -- -- -- --    09/09/24 11:06:25 98.2 °F (36.8 °C) 78 -- 125/80 95 97 % -- -- -- --    09/09/24 0900 -- -- -- -- -- -- -- -- -- No Pain    09/09/24 08:16:50 97.8 °F (36.6 °C) 77 -- 124/75 91 98 % -- -- -- --    09/09/24 0800 -- -- -- -- -- -- None (Room air) -- 15 No Pain    09/09/24 03:02:50 98.1 °F (36.7 °C) 73 15 150/81 104 96 %  None (Room air) Lying -- No Pain    09/08/24 22:25:08 98.2 °F (36.8 °C) 79 16 132/83 99 95 % None (Room air) -- -- No Pain    09/08/24 2000 -- -- -- -- -- -- None (Room air) -- 15 No Pain    09/08/24 16:15:01 98.4 °F (36.9 °C) 66 -- 142/79 100 97 % -- -- -- --    09/08/24 16:14:51 98.4 °F (36.9 °C) 82 -- 142/79 100 97 % -- -- -- --    09/08/24 11:40:02 98.1 °F (36.7 °C) 76 -- 143/79 100 96 % -- -- -- --    09/08/24 0836 -- 77 -- 131/82 98 99 % -- -- -- --    09/08/24 0800 -- -- -- -- -- 98 % None (Room air) -- 15 No Pain    09/08/24 07:47:18 98.1 °F (36.7 °C) 25 -- 149/82 104 95 % -- -- -- --    09/08/24 02:43:28 97.7 °F (36.5 °C) 79 15 120/72 88 95 % -- -- -- --     Weight (last 2 days)       Date/Time Weight    09/11/24 05:09:55 58.5 (128.97)    09/09/24 0852 60.8 (134)      09/10 0701 09/11 0700 09/11 0701  -   P.O. 180    I.V. (mL/kg) 247.8 (4.2) 1157.7 (18.5)   NG/GT  60   IV Piggyback  1120   Cell Saver  500   Total Intake(mL/kg) 427.8 (7.3) 2837.7 (45.4)   Urine (mL/kg/hr) 1350 (1) 1705 (1.1)   Emesis/NG output  0   Stool     Blood  500   Chest Tube  110   Total Output 1350 2315   Net -922.2 +522.7     Pertinent Labs/Diagnostic Results:   MRI brain w wo contrast     1. No acute process seen   2. Susceptibility focus in the right periatrial white matter corresponds to the calcification on head CT. This is nonspecific and may be due to prior granulomatous process or cavernous malformation.        Cardiology:  ECG 12 lead   Final Result  (09/07 1719)   Normal sinus rhythm   Nonspecific T wave abnormality   Abnormal ECG   When compared with ECG of 02-SEP-2024 14:44,   T wave inversion now evident in Anterior leads       Results from last 7 days   Lab Units 09/11/24  1128 09/11/24  1117 09/11/24  1109 09/11/24  1042 09/11/24  1009 09/11/24  0843 09/09/24  0441 09/08/24  0451 09/07/24  0437   WBC Thousand/uL  --   --   --   --   --   --  9.05 6.86 7.71   HEMOGLOBIN g/dL  --   --   --   --   --   --  11.6* 11.1*  11.6*   I STAT HEMOGLOBIN g/dl 8.2*  --  7.8* 7.1* 10.5* 11.2*  --   --   --    HEMATOCRIT %  --   --   --   --   --   --  33.9* 32.2* 33.4*   HEMATOCRIT, ISTAT % 24*  --  23* 21* 31* 33*  --   --   --    PLATELETS Thousands/uL  --  218  --   --   --   --  307 295 286   TOTAL NEUT ABS Thousands/µL  --   --   --   --   --   --  4.99 3.36 4.03     Results from last 7 days   Lab Units 09/11/24  1128 09/11/24  1109 09/11/24  1042 09/11/24  1009 09/11/24  0843 09/09/24  0441 09/06/24  0453 09/05/24  0838   SODIUM mmol/L  --   --   --   --   --  140 141 137   POTASSIUM mmol/L  --   --   --   --   --  4.0 4.0 3.9   CHLORIDE mmol/L  --   --   --   --   --  108 111* 106   CO2 mmol/L  --   --   --   --   --  21 20* 22   CO2, I-STAT mmol/L 24 25 26 26 27  --   --   --    ANION GAP mmol/L  --   --   --   --   --  11 10 9   BUN mg/dL  --   --   --   --   --  22 19 21   CREATININE mg/dL  --   --   --   --   --  0.99 0.91 1.07   EGFR ml/min/1.73sq m  --   --   --   --   --  83 92 76   CALCIUM mg/dL  --   --   --   --   --  9.9 8.8 8.7   CALCIUM, IONIZED, ISTAT mmol/L 1.09* 1.10* 1.01* 1.30 1.36*  --   --   --    MAGNESIUM mg/dL  --   --   --   --   --   --   --  2.0       Results from last 7 days   Lab Units 09/11/24  0538 09/10/24  2029 09/10/24  1550 09/10/24  1122 09/10/24  0551 09/09/24 2056 09/09/24  1542 09/09/24  1126 09/09/24  0555 09/08/24  2127 09/08/24  1613 09/08/24  1056   POC GLUCOSE mg/dl 147* 181* 173* 116 154* 146* 218* 174* 175* 181* 305* 204*     Results from last 7 days   Lab Units 09/09/24  0441 09/06/24  0453 09/05/24  0838   GLUCOSE RANDOM mg/dL 161* 186* 332*       Results from last 7 days   Lab Units 09/11/24  1128 09/11/24  1109 09/11/24  1042 09/11/24  1009 09/11/24  0843   PH, STEFFANY I-STAT   --  7.353  --   --  7.320   PCO2, STEFFANY ISTAT mm HG  --  43.2  --   --  49.0   PO2, STEFFANY ISTAT mm HG  --  35.0  --   --  42.0   HCO3, STEFFANY ISTAT mmol/L  --  24.0  --   --  25.2   I STAT BASE EXC mmol/L -2 -1 0 -4* -1   I  STAT O2 SAT % 100* 65 100* 99* 73   ISTAT PH ART  7.366  --  7.380 7.207*  --    I STAT ART PCO2 mm HG 39.9  --  42.4 60.4*  --    I STAT ART PO2 mm .0*  --  261.0* 153.0*  --    I STAT ART HCO3 mmol/L 22.9  --  25.1 24.0  --      Results from last 7 days   Lab Units 09/10/24  2334 09/10/24  1519 09/10/24  0502   PTT seconds 183* 27 94*     Results from last 7 days   Lab Units 09/11/24  0758   UNIT PRODUCT CODE  [id number]  [id number]  [id number]  [id number]   UNIT NUMBER  [id number]  [id number]  [id number]  [id number]   UNITABO  O  O  O  O   UNITRH  POS  POS  POS  POS   CROSSMATCH  Compatible  Compatible  Compatible  Compatible   UNIT DISPENSE STATUS  Issued  Issued  Issued  Issued   UNIT PRODUCT VOL mL 350  350  350  350     Diet Cardiovascular: Cardiac     Scheduled Medications:  amLODIPine, 5 mg, Oral, Daily  aspirin, 81 mg, Oral, Daily  atorvastatin, 80 mg, Oral, Daily With Dinner  chlorhexidine, 15 mL, Mouth/Throat, Q12H KALYANI  EPINEPHrine 10 mcg/mL in sodium chloride 0.9% 10 mL syringe,  mcg, Intravenous, Once  insulin glargine, 6 Units, Subcutaneous, HS  insulin lispro, 1-5 Units, Subcutaneous, TID AC  insulin lispro, 3 Units, Subcutaneous, TID With Meals  magnesium sulfate 2 g/4 mL perfusion syringe, 2 g, Perfusion, Once  metoprolol succinate, 12.5 mg, Oral, Daily  metoprolol tartrate, 12.5 mg, Oral, Once  mupirocin, 1 Application, Topical, Once  niCARdipine 100 mcg/mL in sodium chloride 0.9% 10 mL syringe, 100-1,000 mcg, Intravenous, Once  phenylephrine, 200-2,000 mcg, Intravenous, Once  phenylephrine 10,000 mcg/20 mL perfusion syringe (500 mcg/mL) 10,000 mcg, 10,000 mcg, Perfusion, Once    Continuous IV Infusions:  EPINEPHrine 5,000 mcg (STANDARD CONCENTRATION) IV in sodium chloride 0.9% 250 mL, 1-10 mcg/min, Intravenous, Titrated  insulin regular 100 units in sodium chloride 0.9% 100 mL, 100 Units, Intravenous, Titrated  niCARdipine (CARDENE) 25 mg (STANDARD  CONCENTRATION) in sodium chloride 0.9% 250 mL, 1-15 mg/hr, Intravenous, Titrated - DC 9/11/24 AT 1139  phenylephrine (MARGARITO-SYNEPHRINE) 50 mg (STANDARD CONCENTRATION) in sodium chloride 0.9% 250 mL,  mcg/min, Intravenous, Titrated - DC 9/11/24 AT 1231    PRN Meds:  heparin (porcine), 4,800 Units, Intravenous, Q6H PRN - 9/10 X 1  Nitroglycerin (NITROSTAT) SL tablet, 0.4 mg, Q5mins PRN - 9/11 X 1  sodium chloride, , , PRN  vancomycin, , , PRN    Discharge Plan:   To be determined   Inpatient Case Management following for all discharge needs    Network Utilization Review Department  ATTENTION: Please call with any questions or concerns to [phone] and carefully listen to the prompts so that you are directed to the right person. All voicemails are confidential.   For Discharge needs, contact Care Management DC Support Team at [phone] opt. [phone]  Send all requests for admission clinical reviews, approved or denied determinations and any other requests to dedicated fax number below belonging to the campus where the patient is receiving treatment. List of dedicated fax numbers for the Facilities:  FACILITY NAME UR FAX NUMBER   ADMISSION DENIALS (Administrative/Medical Necessity) [phone]   DISCHARGE SUPPORT TEAM (NETWORK) [phone]   PARENT CHILD HEALTH (Maternity/NICU/Pediatrics) [phone]   [facility] [phone]   [facility] [phone]   [facility] [phone]   [facility] [phone]   [facility] [phone]   [facility] [phone]   [facility] [phone]   [facility] [phone]   [facility] [phone]   [facility] [phone]   [facility] [phone]   [facility]  [facility] [phone]

## 2024-09-11 NOTE — RESPIRATORY THERAPY NOTE
RT Protocol Note  Aristoteles Toribio Reyes 58 y.o. male MRN: [id number]  Unit/Bed#: PPHP-321-01 Encounter: [id number]    Assessment    Principal Problem:    CAD, multiple vessel  Active Problems:    HTN (hypertension)    NSTEMI (non-ST elevated myocardial infarction) (HCC)    CVA (cerebral vascular accident) (HCC)    Type 2 diabetes mellitus, without long-term current use of insulin (HCC)    Stroke-like symptoms old CVA on CT      Home Pulmonary Medications:  None.       Past Medical History:   Diagnosis Date    History of cardioembolic cerebrovascular accident (CVA) 09/02/2024    History of cardioembolic cerebrovascular accident (CVA) 09/02/2024     Social History     Socioeconomic History    Marital status: /Civil Union     Spouse name: Not on file    Number of children: Not on file    Years of education: Not on file    Highest education level: Not on file   Occupational History    Not on file   Tobacco Use    Smoking status: Some Days     Types: Cigarettes    Smokeless tobacco: Not on file   Substance and Sexual Activity    Alcohol use: Never    Drug use: Never    Sexual activity: Not on file   Other Topics Concern    Not on file   Social History Narrative    Not on file     Social Determinants of Health     Financial Resource Strain: Not on file   Food Insecurity: No Food Insecurity (9/4/2024)    Hunger Vital Sign     Worried About Running Out of Food in the Last Year: Never true     Ran Out of Food in the Last Year: Never true   Transportation Needs: No Transportation Needs (9/4/2024)    PRAPARE - Transportation     Lack of Transportation (Medical): No     Lack of Transportation (Non-Medical): No   Physical Activity: Not on file   Stress: Not on file   Social Connections: Not on file   Intimate Partner Violence: Not on file   Housing Stability: Low Risk  (9/4/2024)    Housing Stability Vital Sign     Unable to Pay for Housing in the Last Year: No     Number of Times Moved in the Last Year: 0      "Homeless in the Last Year: No       Subjective         Objective    Physical Exam:   Assessment Type: Assess only  Bilateral Breath Sounds: Clear    Vitals:  Blood pressure 126/82, pulse 72, temperature 97.9 °F (36.6 °C), temperature source Oral, resp. rate 17, height 5' 2\" (1.575 m), weight 60.8 kg (134 lb), SpO2 99%.          Imaging and other studies: No pertinent imaging studies reviewed.          Plan    Respiratory Plan: Discontinue Protocol        Resp Comments: Pt. evaluated for respiratory protocol. BS=clear and well aerated. Pt. in no distress on RMA. SpO2=99%. Pt. uses no respiratory medications at home. No indication for respiratory intervention at this time. Protocol D/C.   "

## 2024-09-11 NOTE — ANESTHESIA PROCEDURE NOTES
"Central Line Insertion    Performed by: Jonnathan Atkins MD  Authorized by: Jonnathan Atkins MD    Date/Time: 9/11/2024 8:47 AM  Catheter Type:  triple lumen  Consent: Verbal consent obtained. Written consent obtained.  Risks and benefits: risks, benefits and alternatives were discussed  Consent given by: patient  Patient understanding: patient states understanding of the procedure being performed  Patient consent: the patient's understanding of the procedure matches consent given  Required items: required blood products, implants, devices, and special equipment available  Patient identity confirmed: arm band, provided demographic data and hospital-assigned identification number  Time out: Immediately prior to procedure a \"time out\" was called to verify the correct patient, procedure, equipment, support staff and site/side marked as required.  Indications: vascular access and central pressure monitoring  Catheter size: 7 Fr  Patient position: Trendelenburg  Assessment: blood return through all ports and free fluid flow  Preparation: skin prepped with 2% chlorhexidine  Skin prep agent dried: skin prep agent completely dried prior to procedure  Sterile barriers: all five maximum sterile barriers used - cap, mask, sterile gown, sterile gloves, and large sterile sheet  Hand hygiene: hand hygiene performed prior to central venous catheter insertion  sterile gel and probe cover used in ultrasound-guided central venous catheter insertionultrasound permanent image saved  Vessel of Catheter Tip End: svc  Number of attempts: 1  Successful placement: yes  Post-procedure: line sutured, dressing applied and chlorhexidine patch applied  Patient tolerance: Patient tolerated the procedure well with no immediate complications and patient tolerated the procedure well with no immediate complications        "

## 2024-09-11 NOTE — PROGRESS NOTES
Progress Note - Cardiothoracic Surgery   Aristoteles Toribio Reyes 58 y.o. male MRN: [id number]  Unit/Bed#: PPHP-309-01 Encounter: [id number]    Coronary artery disease. S/P coronary artery bypass grafting; POD # 1    24 Hour Events: Extubated, currently on RA. CI 3, swan d/c'd. Remains on cardene 5. Did not receive any volume. Pain ok, doing well    Medications:   Scheduled Meds:  Current Facility-Administered Medications   Medication Dose Route Frequency Provider Last Rate    acetaminophen  650 mg Rectal Q4H PRN Emmy Gina, PA-C      acetaminophen  975 mg Oral Q6H While awake Emmy Gina, PA-C      amiodarone  200 mg Oral Q8H KALYANI Emmy Gina, PA-C      aspirin  325 mg Oral Daily Emmy Gina, PA-C      atorvastatin  80 mg Oral Daily With Dinner Emmy Gina, PA-C      bisacodyl  10 mg Rectal Daily PRN Emmy Gina, PA-C      calcium gluconate  2 g Intravenous Once PRN Emmy Gina, PA-C      cefazolin  2,000 mg Intravenous Q8H Emmy Gina, PA-C 2,000 mg (09/12/24 0026)    chlorhexidine  15 mL Mouth/Throat BID Emmy Gina, PA-C      dexmedetomidine  0.1-0.7 mcg/kg/hr Intravenous Titrated Serena Santiago PA-C Stopped (09/11/24 1442)    fentaNYL  50 mcg Intravenous Once Emmy Gina, PA-C      fentaNYL  50 mcg Intravenous Q1H PRN Emmy Gina, PA-C      heparin (porcine)  5,000 Units Subcutaneous Q8H KALYANI Emmy Gina, PA-C      HYDROmorphone  0.5 mg Intravenous Q2H PRN Emmy Gina, PA-C      insulin regular (HumuLIN R,NovoLIN R) 1 Units/mL in sodium chloride 0.9 % 100 mL infusion  0.3-21 Units/hr Intravenous Titrated Emmy Gina, PA-C 3 Units/hr (09/12/24 0610)    lactated ringers  500 mL Intravenous Once PRN Emmy Gina, PA-C      mupirocin  1 Application Nasal Q12H KALYANI Emmy Gina, PA-C      niCARdipine  2.5-15 mg/hr Intravenous Titrated Emmy Gina, PA-C 5 mg/hr (09/12/24 0330)    ondansetron  4 mg Intravenous Q6H PRN Emmy Fuentes PA-C      oxyCODONE  2.5 mg Oral Q4H PRN Emmy Fuentes PA-C      Or     oxyCODONE  5 mg Oral Q4H PRN Emmy Gina, PA-C      pantoprazole  40 mg Oral Early Morning Emmy Gina, PA-C      phenylephine   mcg/min Intravenous Titrated Emmy Gina, PA-C Stopped (09/11/24 1536)    polyethylene glycol  17 g Oral Daily Emmy Gina, PA-C      potassium chloride  20 mEq Intravenous Once PRN Emmy Gina, PA-C      potassium chloride  40 mEq Intravenous Once PRN Emmy Gina, PA-C      sodium chloride  20 mL/hr Intravenous Continuous Emmy Gina, PA-C 20 mL/hr (09/11/24 1300)     Continuous Infusions:dexmedetomidine, 0.1-0.7 mcg/kg/hr, Last Rate: Stopped (09/11/24 1442)  insulin regular (HumuLIN R,NovoLIN R) 1 Units/mL in sodium chloride 0.9 % 100 mL infusion, 0.3-21 Units/hr, Last Rate: 3 Units/hr (09/12/24 0610)  niCARdipine, 2.5-15 mg/hr, Last Rate: 5 mg/hr (09/12/24 0330)  phenylephine,  mcg/min, Last Rate: Stopped (09/11/24 1536)  sodium chloride, 20 mL/hr, Last Rate: 20 mL/hr (09/11/24 1300)      PRN Meds:.  acetaminophen    bisacodyl    calcium gluconate    fentaNYL    HYDROmorphone    lactated ringers    ondansetron    oxyCODONE **OR** oxyCODONE    potassium chloride    potassium chloride    Vitals:   Vitals:    09/11/24 2310 09/12/24 0020 09/12/24 0415 09/12/24 0552   BP:       Pulse: 78 80 80    Resp: 17 21 21    Temp:   98.1 °F (36.7 °C)    TempSrc:   Core    SpO2: 100% 100% 98%    Weight:    62.5 kg (137 lb 12.6 oz)   Height:           Hemodynamics:  PAP: (28-35)/(8-17) 31/11  CO:  [4.3 L/min-6.5 L/min] 4.9 L/min  CI:  [2.7 L/min/m2-4.1 L/min/m2] 3.1 L/min/m2    Respiratory:   SpO2: SpO2: 98 %; Room Air    Intake/Output:   I/O         09/09 0701  09/10 0700 09/10 0701  09/11 0700 09/11 0701  09/12 0700    P.O. 540 180     I.V. (mL/kg) 408.3 (6.7) 247.8 (4.2) 887.3 (15.2)    NG/GT   60    IV Piggyback   1120    Cell Saver   500    Total Intake(mL/kg) 948.3 (15.6) 427.8 (7.3) 2567.3 (43.9)    Urine (mL/kg/hr) 1950 (1.3) 1350 (1) 1095 (2)    Emesis/NG output   0     Blood   500    Chest Tube   55    Total Output 1950 1350 1650    Net -1001.7 -922.2 +917.3                 +522cc/24hr, UO 1.7L/24hr    Weights:   Weight (last 2 days)       Date/Time Weight    09/12/24 0552 62.5 (137.79)    09/11/24 05:09:55 58.5 (128.97)          Admit Weight: 60.8kg    Chest tube Output:    Mediastinal tubes: 30 mL/8 hours  110 mL/24 hours   Pleural tubes: 0 mL/8 hours  45 mL/24 hours     Results:   Results from last 7 days   Lab Units 09/12/24 0410 09/11/24 2009 09/11/24  1244 09/11/24  1240 09/11/24  1128 09/11/24  1117 09/11/24  0843 09/09/24  0441 09/08/24  0451   WBC Thousand/uL 13.77*  --   --   --   --   --   --  9.05 6.86   HEMOGLOBIN g/dL 10.1* 9.9*  --  9.6*  --   --   --  11.6* 11.1*   I STAT HEMOGLOBIN g/dl  --   --  9.2*  --    < >  --    < >  --   --    HEMATOCRIT % 30.0* 29.4*  --  28.2*  --   --   --  33.9* 32.2*   HEMATOCRIT, ISTAT %  --   --  27*  --    < >  --    < >  --   --    PLATELETS Thousands/uL 205  --   --  183  --  218  --  307 295    < > = values in this interval not displayed.     Results from last 7 days   Lab Units 09/12/24 0417 09/11/24 2009 09/11/24  1604 09/11/24  1244 09/11/24  1240 09/11/24  0843 09/09/24  0441   SODIUM mmol/L 138  --   --   --  140  --  140   POTASSIUM mmol/L 4.2 4.7 3.5  --  4.2  --  4.0   CHLORIDE mmol/L 110*  --   --   --  110*  --  108   CO2 mmol/L 19*  --   --   --  22  --  21   CO2, I-STAT mmol/L  --   --   --  22  --    < >  --    BUN mg/dL 17  --   --   --  16  --  22   CREATININE mg/dL 0.96  --   --   --  0.98  --  0.99   GLUCOSE, ISTAT mg/dl  --   --   --  162*  --    < >  --    CALCIUM mg/dL 9.1  --   --   --  8.7  --  9.9    < > = values in this interval not displayed.     Recent Labs     09/12/24  0410   MG 2.2     Results from last 7 days   Lab Units 09/10/24  2334 09/10/24  1519 09/10/24  0502   PTT seconds 183* 27 94*       Point of care glucose: 153 - 196    Studies:    CXR:     EKG: NSR    Reviewed radiology reports from  this admission including: chest xray.    Invasive Lines/Tubes:  Invasive Devices       Central Venous Catheter Line  Duration             CVC Central Lines 09/11/24 <1 day              Peripheral Intravenous Line  Duration             Peripheral IV 09/09/24 Right;Ventral (anterior) Forearm 2 days              Arterial Line  Duration             Arterial Line 09/11/24 Left Radial <1 day              Line  Duration             Pacer Wires <1 day              Drain  Duration             Chest Tube 1 Left Pleural 32 Fr. <1 day    Chest Tube 2 Mediastinal 32 Fr. <1 day    Chest Tube 3 Mediastinal 24 Fr. <1 day    NG/OG/Enteral Tube Orogastric 16 Fr Right mouth <1 day    Urethral Catheter Latex;Temperature probe 16 Fr. <1 day                    Physical Exam:    General: No acute distress, Alert, and Normal appearance  HEENT/NECK:  Normocephalic. Atraumatic.  no jugular venous distention.    Cardiac: Regular rate and rhythm and No murmurs/rubs/gallops  Pulmonary:  Breath sounds diminished in the bases bilaterally  and No rales/rhonchi/wheezes  Abdomen:  Non-tender, Non-distended, and Hypo-active bowel sounds  Incisions: Sternum is stable.  Incision dressed with Acticoat.  No erythema or drainage and Saphenectomy incision dressed with Acticoat.  No erythema or drainage  Extremities: Extremities warm/dry and No edema B/L  Neuro: Alert and oriented X 3, Sensation is grossly intact, and No focal deficits  Skin: Warm/Dry, without rashes or lesions.    Assessment:  Principal Problem:    CAD, multiple vessel  Active Problems:    HTN (hypertension)    NSTEMI (non-ST elevated myocardial infarction) ([facility])    CVA (cerebral vascular accident) ([facility])    Type 2 diabetes mellitus, without long-term current use of insulin ([facility])    Stroke-like symptoms old CVA on CT    S/P CABG x 2       Coronary artery disease. S/P coronary artery bypass grafting; POD # 1    Plan:    Cardiac:     S/P acute preoperative NSTEMI  Normal ventricular systolic  function, EF 50%    Cardiac infusions: Cardene, 5 mg/hour, wean off today    Cardiac index > 2.2  [place] catheter is out  D/C Arterial line when off cardene    NSR; HR well-controlled  BP well-controlled  Start Lopressor, 25mg PO BID  Hold ACE inhibitor/ARB secondary to hypotension    Continue prophylactic Amiodarone, 200 mg PO TID    Continue ASA and Statin therapy    Maintain epicardial pacing wires for beta blocker initiation    Maintain central IV access today for medications requiring central IV access    Continue Subcutaneous Heparin for DVT prophylaxis    Consult cardiology for postoperative medical management    Pulmonary:     Extubated    CXR findings: lungs clear, no eff or PTX, large gastric bubble, line and tubes in place, wires intact    Good Room air oxygen saturation; Continue incentive spirometry/Coughing/Deep breathing exercises    Chest tube output remains persistently high; Continue chest tubes to suction today    Renal:     Normal preoperative renal function  Creatinine 0.96 today, from 0.98  BMP ordered for tomorrow    Intake/Output net: +522 mL/24 hours, UO 1.7L/24h    Diuretic Regimen:  Start IV Lasix, 40 mg BID  Start Potassium Chloride 20 mEq PO BID    Discontinue potassium replacement scales    Remove mcclendon catheter    Neuro:    Neurologically intact; No active issues     Incisional pain well controlled     Continue tylenol, 975 mg PO q 8, standing dose   Continue oxycodone, 2.5 to 5 mg PO q 4 hours prn pain    GI:    Cardiac diet, with 1800 mL fluid restriction or Controlled carbohydrate diet level two/Cardiac diet, with 1800 mL fluid restriction    Tolerating diet without complaint    Continue stool softeners and prn suppository    Continue GI prophylaxis    Endo:     History of diabetes: Continue insulin infusion today. Consult Endocrinology for management    Hematology:     Post-operative blood count acceptable; Trend prn  Leukocytosis; Afebrile with no signs of infection; Trend CBC  prn    Disposition:    Transfer from ICU to telemetry today once off cardene      VTE Pharmacologic Prophylaxis: Heparin  VTE Mechanical Prophylaxis: sequential compression device    Collaborative rounds completed with supervising physician and with the bedside nurse    SIGNATURE: Emily Haynes PA-C  DATE: September 12, 2024  TIME: 6:48 AM

## 2024-09-11 NOTE — CONSULTS
Consultation - Critical Care/ICU   Name: Aristoteles Toribio Reyes 58 y.o. male I MRN: [id number]  Unit/Bed#: PPHP-309-01 I Date of Admission: 9/5/2024   Date of Service: 9/11/2024 I Hospital Day: 6   Inpatient consult to Medical Critical Care  Consult performed by: Serena Santiago PA-C  Consult ordered by: Emmy Fuentes PA-C        Physician Requesting Evaluation: Shaun Hernandez DO   Reason for Evaluation / Principal Problem: S/p CABG        Assessment & Plan   Impressions:  MVCAD s/p CABG x 2  HTN  DM2  CVA    Neuro:   Discontinue continuous sedation.   ATC tylenol, PRN oxycodone for pain  Trend neuro exam  Delirium precautions    CV:   Goals:   Cardiac Surgery Hemodynamic Monitoring goals: MAP goal >65 CI >2.2 SBP < 130  Volume resuscitation as needed.   Epicardial pacing wire plan : Epicardial pacing wires in place. Will pace as needed for bradycardia or cardiac output   Monitor rhythm on telemetry.        Lung:   Check STAT post-op ABG and CXR  Wean vent with spontaneous breathing trial with goal to extubate today    GI:   GI prophylaxis with PPI  Bowel regimen  Zofran PRN for nausea.     FEN:   NPO Replenish K >4.0, mag >2.0 and calcium >7.0.     :   Check STAT post-op BMP  Chavez in place.   Monitor UOP with goal >0.5cc/kg/hour.  Lasix versus volume resuscitate as needed depending on hemodynamics and volume status.    ID:   Prophylactic post-op abx. Maintain normothermia. Trend temps.     Heme:   Check STAT post-op H/H and platelets.  Monitor incision site, invasive lines, and chest tube outputs for bleeding. Send coag panel if needed.    Endo:   Insulin gtt for blood sugar control.     Disposition: ICU Care     History of Present Illness   HPI: Aristoteles Toribio Reyes is a 58 y.o. male PMhx HTN, CVA, DM2, who presented to [facility] 9/2 with complaints of chest pain and SOB that started the evening prior. He had elevated troponins and was admitted for management of NSTEMI with DAPT and  heparin. There was concern for stroke with LLE so MRI was obtained without any acute findings. He underwent cardiac catheterization 9/4 which revealed MVCAD. He was transferred to [facility] for cardiac surgery evaluation. It was recommended he undergo surgical revascularization and the patient was amenable. He presents today to the ICU s/p CABG x 2 (LIMA to LAD, SVG to rPDA).     Intra-op MARY LVEF 50-55%  Post-op medications: Critical Care Infusions : No cardiac infusions    ROS unable to be obtained due to patient being intubated and sedated.   History obtained from chart review due to patient being intubated and sedated.   Historical Information   Past Medical History:  09/02/2024: History of cardioembolic cerebrovascular accident (CVA)  09/02/2024: History of cardioembolic cerebrovascular accident (CVA) Past Surgical History:  9/4/2024: CARDIAC CATHETERIZATION; N/A      Comment:  Procedure: Cardiac catheterization;  Surgeon: Juan Fuentes MD;  Location: [facility] CARDIAC [facility];  Service:                Cardiology   Current Outpatient Medications   Medication Instructions    lisinopril (ZESTRIL) 10 mg, Oral, Daily    metFORMIN (GLUCOPHAGE) 1,000 mg, Oral, 2 times daily with meals    No Known Allergies   Social History     Tobacco Use    Smoking status: Some Days     Types: Cigarettes   Substance Use Topics    Alcohol use: Never    Drug use: Never    No family history on file.         Objective        Vitals: Invasive Monitoring      /65   Pulse 93   Resp 14   O2 Sat 100 %   O2 Device Ventilator   Temp 96.6F    Arterial Line  [place] BP  133/65  No data recorded   MAP   89 No data recorded     PA Catheter   Most Recent  Min/Max in 24hrs    PAP  36/15 No data recorded   CVP  16 No data recorded   CI   4.1 No data recorded   SVR  912 No data recorded          Physical Exam   Physical Exam  Skin:     General: Skin is warm and dry.      Capillary Refill: Capillary refill takes 2 to 3 seconds.   HENT:       Mouth/Throat:      Mouth: Mucous membranes are moist.   Neck:      Vascular: Central line present.   Cardiovascular:      Rate and Rhythm: Normal rate and regular rhythm.      Heart sounds:      No friction rub.   Musculoskeletal:      Right lower leg: No edema.      Left lower leg: No edema.   Abdominal: General: There is no distension.      Palpations: Abdomen is soft.   Constitutional:       Interventions: He is sedated, intubated and restrained.   Pulmonary:      Effort: He is intubated.      Breath sounds: No wheezing, rhonchi or rales.      Comments: CTs without airleak, scant drainage  Genitourinary/Anorectal:  Chavez present.        Diagnostic Studies      09/11/24 CXR: Lines and tubes in appropriate position.   This was personally reviewed by myself in PACS.  09/11/24 EKG: NSR.   This was personally reviewed by myself.     Medications:  Scheduled PRN   acetaminophen, 975 mg, Q6H While awake  amiodarone, 200 mg, Q8H KALYANI  aspirin, 325 mg, Daily  atorvastatin, 80 mg, Daily With Dinner  cefazolin, 2,000 mg, Q8H  chlorhexidine, 15 mL, BID  fentaNYL, 50 mcg, Once  [START ON 9/12/2024] heparin (porcine), 5,000 Units, Q8H KALYANI  magnesium sulfate, 2 g, Once  mupirocin, 1 Application, Q12H KALYANI  pantoprazole, 40 mg, Early Morning  polyethylene glycol, 17 g, Daily      acetaminophen, 650 mg, Q4H PRN  bisacodyl, 10 mg, Daily PRN  calcium gluconate, 2 g, Once PRN  fentaNYL, 50 mcg, Q1H PRN  HYDROmorphone, 0.5 mg, Q2H PRN  lactated ringers, 500 mL, Once PRN  ondansetron, 4 mg, Q6H PRN  oxyCODONE, 2.5 mg, Q4H PRN   Or  oxyCODONE, 5 mg, Q4H PRN  potassium chloride, 20 mEq, Once PRN  potassium chloride, 40 mEq, Once PRN  potassium chloride, 40 mEq, Once PRN       Continuous    insulin regular (HumuLIN R,NovoLIN R) 1 Units/mL in sodium chloride 0.9 % 100 mL infusion, 0.3-21 Units/hr, Last Rate: 3 Units/hr (09/11/24 1252)  niCARdipine, 2.5-15 mg/hr, Last Rate: 2.5 mg/hr (09/11/24 2306)  phenylephine,  mcg/min  sodium  chloride, 20 mL/hr         Labs:  CBC  Recent Labs     09/11/24  1117 09/11/24  1128 09/11/24  1244   HGB  --  8.2* 9.2*   HCT  --  24* 27*     --   --      BMP  Recent Labs     09/11/24  1128 09/11/24  1244   CO2 24 22       Coags  Recent Labs     09/10/24  1519 09/10/24  2334   PTT 27 183*        Additional Electrolytes  Recent Labs     09/11/24  1128 09/11/24  1244   CAIONIZED 1.09* 1.32          Blood Gas  No recent results  No recent results LFTs  No recent results    Infectious  No recent results     No recent results Glucose  No recent results     Invasive lines and devices:  Invasive Devices       Central Venous Catheter Line  Duration             CVC Central Lines 09/11/24 <1 day    Introducer 09/11/24 <1 day              Peripheral Intravenous Line  Duration             Peripheral IV 09/09/24 Right;Ventral (anterior) Forearm 1 day              Arterial Line  Duration             Arterial Line 09/11/24 Left Radial <1 day              Line  Duration             Pacer Wires <1 day              Drain  Duration             Chest Tube 1 Left Pleural 32 Fr. <1 day    Chest Tube 2 Mediastinal 32 Fr. <1 day    Chest Tube 3 Mediastinal 24 Fr. <1 day    Urethral Catheter Latex;Temperature probe 16 Fr. <1 day              Airway  Duration             ETT  Hi-Lo;Cuffed;Oral 8 mm <1 day

## 2024-09-11 NOTE — INTERVAL H&P NOTE
H&P reviewed. After examining the patient I find no changes in the patients condition since the H&P had been written.    Anticipated Length of Stay:  Patient will be admitted on an Inpatient basis with an anticipated length of stay of  greater than 2 midnights.       Justification for Hospital Stay:  Post surgical recovery following open heart surgery.      Vitals:    09/11/24 0509   BP: 125/77   Pulse: 70   Resp:    Temp:    SpO2: 100%

## 2024-09-11 NOTE — RESTORATIVE TECHNICIAN NOTE
Restorative Technician Note      Patient Name: Aristoteles Toribio Reyes     Restorative Tech Visit Date: 09/11/24  Note Type: Mobility  Patient Position Upon Consult: Supine  Activity Performed: Ambulated  Assistive Device: Other (Comment) (none)  Patient Position at End of Consult: All needs within reach; Other (comment) (on the stretcher)

## 2024-09-11 NOTE — ANESTHESIA POSTPROCEDURE EVALUATION
Post-Op Assessment Note    CV Status:  Stable    Pain management: adequate       Mental Status:  Sleepy and somnolent   Hydration Status:  Stable   PONV Controlled:  None   Airway Patency:  Patent  Airway: intubated     Post Op Vitals Reviewed: Yes    No anethesia notable event occurred.    Staff: Anesthesiologist               BP   123/66   Temp   97.5F   Pulse  92   Resp   12/min   SpO2   98%

## 2024-09-11 NOTE — OP NOTE
OPERATIVE REPORT  PATIENT NAME: Aristoteles Toribio Reyes    :  1965  MRN: [id number]  Pt Location:  OR ROOM 10    SURGERY DATE: 2024    SURGEON: Shaun Hernandez DO    ASSISTANT: Emmy Fuentes PA-C    PREOPERATIVE DIAGNOSIS:  Multivessel coronary artery disease, s/p Acute NSTEMI    POSTOPERATIVE DIAGNOSIS:  Multivessel coronary artery disease, s/p Acute NSTEMI    NYHA Class: 2    CCS Class: 3    PROCEDURE: Coronary artery bypass grafting x 2 with left internal mammary artery to left anterior descending, saphenous vein graft to right posterior descending artery.    ANESTHESIA: General endotracheal anesthesia with transesophageal echocardiogram guidance, Dr. Jonnathan Atkins     CARDIOPULMONARY BYPASS TIME: 55 minutes.     CROSSCLAMP TIME: 49 minutes.    PACKS/TUBES/DRAINS: Chest tubes x 3.     MATERIALS: Pacing wires: A x1. V x1.     TRANSFUSION: None.     SPECIMENS: None.     ESTIMATED BLOOD LOSS: 250 mL    OPERATIVE TECHNIQUE:    The patient was taken to the operating room and placed supine on the operating table. Following the satisfactory induction of general anesthesia and placement of monitoring lines, the patient was prepped and draped in the usual sterile fashion. A time-out procedure was performed.    The patient underwent median sternotomy, LIMA harvest, endoscopic right greater saphenous vein harvest, systemic heparinization and conduit preparation. The patient underwent pericardiotomy and epiaortic ultrasound was used to evaluate the ascending aorta, which was found to be free of significant atheromatous disease. The patient underwent aortic and right atrial cannulation and was initiated on bypass. The ascending aorta was crossclamped. [place]te[place] cardioplegia was delivered with an excellent arrest.    The saphenous vein was anastomosed to the right posterior descending artery in end-to-side fashion using running 7-0 Prolene suture. The flow was good at 120ml/min. The left internal  mammary artery was anastomosed to the left anterior descending in end-to-side fashion using running 7-0 Prolene suture. The flow was excellent. One proximal anastomosis wascompleted on the ascending aorta in end-to-side fashion using running 6-0 Prolene suture. The heart was de-aired and the crossclamp was removed. Atrial and ventricular pacing wires were placed. Following a period of reperfusion, the patient was weaned from cardiopulmonary bypass and decannulated. Protamine was administered with normalization of the ACT. Hemostasis was confirmed in all fields. Thoracostomy tubes were placed. The sternum was closed with stainless steel wires. The fascia, subdermis and skin were closed with multiple layers of running absorbable suture.    As the attending surgeon, I was present and scrubbed for all critical portions of this procedure. Sponge, needle and instrument counts were reported as correct by the nursing staff. There is no cardiac residency program at this facility and for complex procedures such as this, it is vital to have a physician assistant experienced in cardiac surgery.  The assistance of Emmy Fuentes PA-C was necessary for endoscopic saphenous vein harvesting, retraction of the heart and coronary conduit with proper tissue handling techniques, and following of the suture with correct tension during construction of the proximal and distal coronary anastomoses.    Final transesophageal echocardiogram demonstrated normal biventricular function.        SIGNATURE: Shaun Hernandez DO  DATE: September 11, 2024  TIME: 12:01 PM

## 2024-09-11 NOTE — RESPIRATORY THERAPY NOTE
RT Protocol Note  Aristoteles Toribio Reyes 58 y.o. male MRN: [id number]  Unit/Bed#: [facility]-309-01 Encounter: [id number]    Assessment    Principal Problem:    CAD, multiple vessel  Active Problems:    HTN (hypertension)    NSTEMI (non-ST elevated myocardial infarction) (HCC)    CVA (cerebral vascular accident) (HCC)    Type 2 diabetes mellitus, without long-term current use of insulin (HCC)    Stroke-like symptoms old CVA on CT    S/P CABG x 2      Home Pulmonary Medications:  none       Past Medical History:   Diagnosis Date    History of cardioembolic cerebrovascular accident (CVA) 09/02/2024    History of cardioembolic cerebrovascular accident (CVA) 09/02/2024     Social History     Socioeconomic History    Marital status: /Civil Union     Spouse name: Not on file    Number of children: Not on file    Years of education: Not on file    Highest education level: Not on file   Occupational History    Not on file   Tobacco Use    Smoking status: Some Days     Types: Cigarettes    Smokeless tobacco: Not on file   Substance and Sexual Activity    Alcohol use: Never    Drug use: Never    Sexual activity: Not on file   Other Topics Concern    Not on file   Social History Narrative    Not on file     Social Determinants of Health     Financial Resource Strain: Not on file   Food Insecurity: No Food Insecurity (9/4/2024)    Hunger Vital Sign     Worried About Running Out of Food in the Last Year: Never true     Ran Out of Food in the Last Year: Never true   Transportation Needs: No Transportation Needs (9/4/2024)    PRAPARE - Transportation     Lack of Transportation (Medical): No     Lack of Transportation (Non-Medical): No   Physical Activity: Not on file   Stress: Not on file   Social Connections: Not on file   Intimate Partner Violence: Not on file   Housing Stability: Low Risk  (9/4/2024)    Housing Stability Vital Sign     Unable to Pay for Housing in the Last Year: No     Number of Times Moved in the Last  "Year: 0     Homeless in the Last Year: No       Subjective         Objective    Physical Exam:   Assessment Type: (P) Assess only  General Appearance: (P) Sedated  Respiratory Pattern: (P) Assisted  Chest Assessment: (P) Chest expansion symmetrical  Bilateral Breath Sounds: (P) Clear    Vitals:  Blood pressure 125/77, pulse 70, temperature 97.9 °F (36.6 °C), temperature source Oral, resp. rate 17, height 5' 2\" (1.575 m), weight 58.5 kg (128 lb 15.5 oz), SpO2 100%.          Imaging and other studies: No pertinent imaging studies reviewed.          Plan    Respiratory Plan: (P) Vent/NIV/HFNC  Airway Clearance Plan: (P) Incentive Spirometer     Resp Comments: (P) pt ordered on respiratory and airway clearance protocol. pt has no pulmonary hx or takes any respiratory meds at home per chart. pt placed on respiratory protocol for vent managment.  will instruct pt on IS once extubated and will floow pt per airway clearance protocol   "

## 2024-09-11 NOTE — RESPIRATORY THERAPY NOTE
RT Ventilator Management Note  Aristoteles Toribio Reyes 58 y.o. male MRN: [id number]  [facility]: [id number]      Daily Screen         9/11/2024  1230             Patient safety screen outcome:: Failed (P)     Not Ready for Weaning due to:: Underline problem not resolved (P)               Physical Exam:   Assessment Type: (P) Assess only  General Appearance: (P) Sedated  Respiratory Pattern: (P) Assisted  Chest Assessment: (P) Chest expansion symmetrical  Bilateral Breath Sounds: (P) Clear      Resp Comments: (P) pt arived from OR placed on vent ac settings will follow wean and extubate protocol

## 2024-09-11 NOTE — ANESTHESIA PROCEDURE NOTES
Procedure Performed: MARY Anesthesia  Start Time:  9/11/2024 8:49 AM        Preanesthesia Checklist    Patient identified, IV assessed, risks and benefits discussed, monitors and equipment assessed, procedure being performed at surgeon's request and anesthesia consent obtained.      Procedure    Diagnostic Indications for MARY:  assessment of ascending aorta, assessment of surgical repair and hemodynamic monitoring. Type of MARY: complete MARY with interpretation. Images Saved: ultrasound permanent image saved. Physician Requesting Echo: Shaun Hernandez DO.  Location performed: OR. Intubated.  Heart visualized. Insertion of MARY Probe:  Easy. Probe Type:  Epiaortic and multiplane. Modalities:  Color flow mapping, 3D, pulse wave Doppler and continuous wave Doppler.      Echocardiographic and Doppler Measurements    PREPROCEDURE    LEFT VENTRICLE:  Systolic Function: normal. Ejection Fraction: 50-55%. Cavity size: normal.   Regional Wall Motion Abnormalities: mild anterior and anteroseptalhypokinesis.    RIGHT VENTRICLE:  Systolic Function: normal.  Cavity size normal. No hypertrophy              AORTIC VALVE:  Leaflets: normal and trileaflet. Leaflet motions normal and normal. Stenosis: none.     Regurgitation: none.      MITRAL VALVE:  Leaflets: normal. Leaflet Motions: normal. Regurgitation: trace.   Stenosis: none.       TRICUSPID VALVE:  Leaflets: normal. Leaflet Motions: normal. Stenosis: none. Regurgitation: trace.      PULMONIC VALVE:  Leaflets: normal. Regurgitation: none. Stenosis: none.        ASCENDING AORTA:  Size:  normal.  Dissection not present.      AORTIC ARCH:  Size:  normal.  dissection not present. Grade 2: severe intimal thickening without protruding atheroma.    DESCENDING AORTA:  Size: normal.  Dissection not present. Grade 3: atheroma protruding < 0.5 cm into lumen.        RIGHT ATRIUM:  Size:  normal. No spontaneous echo contrast.    LEFT ATRIUM:  Size: normal. No spontaneous echo contrast.    LEFT  ATRIAL APPENDAGE:  Size: normal. No spontaneous echo contrast         ATRIAL SEPTUM:  Intra-atrial septal morphology: normal.          VENTRICULAR SEPTUM:  Intra-ventricular septum morphology: normal.         EPIAORTIC:  Plaque Thickness: 0-5 mm.    OTHER FINDINGS:  Pericardium:  normal. Pleural Effusion:  none.        POSTPROCEDURE    LEFT VENTRICLE:   Systolic Function: normal. Ejection Fraction: 50-55 %. Cavity Size: normal. Regional Wall Motion Abnormalities: Improved wall motion in previously hypokinetic walls.    RIGHT VENTRICLE: Unchanged .           AORTIC VALVE: Unchanged .           MITRAL VALVE: Unchanged .         TRICUSPID VALVE: Unchanged .        PULMONIC VALVE: Unchanged             ATRIA: Unchanged .          AORTA: Unchanged .        REMOVAL:  Probe Removal: atraumatic.

## 2024-09-11 NOTE — ANESTHESIA PROCEDURE NOTES
"Introducer/[place]    Performed by: Jonnathan Atkins MD  Authorized by: Jonnathan Atkins MD    Date/Time: 9/11/2024 8:47 AM  Consent: Verbal consent obtained. Written consent obtained.  Risks and benefits: risks, benefits and alternatives were discussed  Consent given by: patient  Patient understanding: patient states understanding of the procedure being performed  Patient consent: the patient's understanding of the procedure matches consent given  Required items: required blood products, implants, devices, and special equipment available  Patient identity confirmed: arm band, provided demographic data and hospital-assigned identification number  Time out: Immediately prior to procedure a \"time out\" was called to verify the correct patient, procedure, equipment, support staff and site/side marked as required.  Indications: vascular access and central pressure monitoring  Location details: right internal jugular  Catheter size: 9 Fr  Patient position: Trendelenburg  Assessment: blood return through all ports and free fluid flow  Preparation: skin prepped with 2% chlorhexidine  Skin prep agent dried: skin prep agent completely dried prior to procedure  Sterile barriers: all five maximum sterile barriers used - cap, mask, sterile gown, sterile gloves, and large sterile sheet  Hand hygiene: hand hygiene performed prior to central venous catheter insertion  Ultrasound guidance: yes  ultrasound permanent image saved  Number of attempts: 1  Successful placement: yes  Post-procedure: line sutured and dressing applied  Patient tolerance: Patient tolerated the procedure well with no immediate complications and patient tolerated the procedure well with no immediate complications        "

## 2024-09-12 ENCOUNTER — APPOINTMENT (INPATIENT)
Dept: RADIOLOGY | Facility: HOSPITAL | Age: 59
End: 2024-09-12
Payer: COMMERCIAL

## 2024-09-12 LAB
ANION GAP SERPL CALCULATED.3IONS-SCNC: 9 MMOL/L (ref 4–13)
BUN SERPL-MCNC: 17 MG/DL (ref 5–25)
CALCIUM SERPL-MCNC: 9.1 MG/DL (ref 8.4–10.2)
CHLORIDE SERPL-SCNC: 110 MMOL/L (ref 96–108)
CO2 SERPL-SCNC: 19 MMOL/L (ref 21–32)
CREAT SERPL-MCNC: 0.96 MG/DL (ref 0.6–1.3)
ERYTHROCYTE [DISTWIDTH] IN BLOOD BY AUTOMATED COUNT: 13.3 % (ref 11.6–15.1)
GFR SERPL CREATININE-BSD FRML MDRD: 86 ML/MIN/1.73SQ M
GLUCOSE SERPL-MCNC: 118 MG/DL (ref 65–140)
GLUCOSE SERPL-MCNC: 121 MG/DL (ref 65–140)
GLUCOSE SERPL-MCNC: 125 MG/DL (ref 65–140)
GLUCOSE SERPL-MCNC: 125 MG/DL (ref 65–140)
GLUCOSE SERPL-MCNC: 139 MG/DL (ref 65–140)
GLUCOSE SERPL-MCNC: 141 MG/DL (ref 65–140)
GLUCOSE SERPL-MCNC: 148 MG/DL (ref 65–140)
GLUCOSE SERPL-MCNC: 150 MG/DL (ref 65–140)
GLUCOSE SERPL-MCNC: 153 MG/DL (ref 65–140)
GLUCOSE SERPL-MCNC: 177 MG/DL (ref 65–140)
GLUCOSE SERPL-MCNC: 195 MG/DL (ref 65–140)
GLUCOSE SERPL-MCNC: 196 MG/DL (ref 65–140)
GLUCOSE SERPL-MCNC: 201 MG/DL (ref 65–140)
GLUCOSE SERPL-MCNC: 92 MG/DL (ref 65–140)
HCT VFR BLD AUTO: 30 % (ref 36.5–49.3)
HGB BLD-MCNC: 10.1 G/DL (ref 12–17)
MAGNESIUM SERPL-MCNC: 2.2 MG/DL (ref 1.9–2.7)
MCH RBC QN AUTO: 31.6 PG (ref 26.8–34.3)
MCHC RBC AUTO-ENTMCNC: 33.7 G/DL (ref 31.4–37.4)
MCV RBC AUTO: 94 FL (ref 82–98)
PLATELET # BLD AUTO: 205 THOUSANDS/UL (ref 149–390)
PMV BLD AUTO: 10.9 FL (ref 8.9–12.7)
POTASSIUM SERPL-SCNC: 4.2 MMOL/L (ref 3.5–5.3)
RBC # BLD AUTO: 3.2 MILLION/UL (ref 3.88–5.62)
SODIUM SERPL-SCNC: 138 MMOL/L (ref 135–147)
WBC # BLD AUTO: 13.77 THOUSAND/UL (ref 4.31–10.16)

## 2024-09-12 PROCEDURE — 97166 OT EVAL MOD COMPLEX 45 MIN: CPT

## 2024-09-12 PROCEDURE — 99255 IP/OBS CONSLTJ NEW/EST HI 80: CPT | Performed by: INTERNAL MEDICINE

## 2024-09-12 PROCEDURE — 71045 X-RAY EXAM CHEST 1 VIEW: CPT

## 2024-09-12 PROCEDURE — 83735 ASSAY OF MAGNESIUM: CPT | Performed by: PHYSICIAN ASSISTANT

## 2024-09-12 PROCEDURE — 80048 BASIC METABOLIC PNL TOTAL CA: CPT | Performed by: PHYSICIAN ASSISTANT

## 2024-09-12 PROCEDURE — 97163 PT EVAL HIGH COMPLEX 45 MIN: CPT

## 2024-09-12 PROCEDURE — 85027 COMPLETE CBC AUTOMATED: CPT | Performed by: PHYSICIAN ASSISTANT

## 2024-09-12 PROCEDURE — 99024 POSTOP FOLLOW-UP VISIT: CPT | Performed by: PHYSICIAN ASSISTANT

## 2024-09-12 PROCEDURE — 97535 SELF CARE MNGMENT TRAINING: CPT

## 2024-09-12 PROCEDURE — 82948 REAGENT STRIP/BLOOD GLUCOSE: CPT

## 2024-09-12 PROCEDURE — 99232 SBSQ HOSP IP/OBS MODERATE 35: CPT | Performed by: PHYSICIAN ASSISTANT

## 2024-09-12 PROCEDURE — 93005 ELECTROCARDIOGRAM TRACING: CPT

## 2024-09-12 RX ORDER — MAGNESIUM SULFATE HEPTAHYDRATE 40 MG/ML
2 INJECTION, SOLUTION INTRAVENOUS ONCE
Status: COMPLETED | OUTPATIENT
Start: 2024-09-12 | End: 2024-09-12

## 2024-09-12 RX ORDER — HEPARIN SODIUM 5000 [USP'U]/ML
5000 INJECTION, SOLUTION INTRAVENOUS; SUBCUTANEOUS EVERY 8 HOURS SCHEDULED
Status: DISCONTINUED | OUTPATIENT
Start: 2024-09-12 | End: 2024-09-15 | Stop reason: HOSPADM

## 2024-09-12 RX ORDER — FUROSEMIDE 10 MG/ML
40 INJECTION INTRAMUSCULAR; INTRAVENOUS
Status: DISCONTINUED | OUTPATIENT
Start: 2024-09-12 | End: 2024-09-13

## 2024-09-12 RX ORDER — POTASSIUM CHLORIDE 1500 MG/1
20 TABLET, EXTENDED RELEASE ORAL 2 TIMES DAILY
Status: DISCONTINUED | OUTPATIENT
Start: 2024-09-12 | End: 2024-09-15 | Stop reason: HOSPADM

## 2024-09-12 RX ORDER — DOCUSATE SODIUM 100 MG/1
100 CAPSULE, LIQUID FILLED ORAL 2 TIMES DAILY
Status: DISCONTINUED | OUTPATIENT
Start: 2024-09-12 | End: 2024-09-15 | Stop reason: HOSPADM

## 2024-09-12 RX ORDER — METOPROLOL TARTRATE 25 MG/1
25 TABLET, FILM COATED ORAL EVERY 12 HOURS SCHEDULED
Status: DISCONTINUED | OUTPATIENT
Start: 2024-09-12 | End: 2024-09-13

## 2024-09-12 RX ORDER — TEMAZEPAM 15 MG/1
15 CAPSULE ORAL
Status: DISCONTINUED | OUTPATIENT
Start: 2024-09-12 | End: 2024-09-15 | Stop reason: HOSPADM

## 2024-09-12 RX ADMIN — HEPARIN SODIUM 5000 UNITS: 5000 INJECTION INTRAVENOUS; SUBCUTANEOUS at 06:03

## 2024-09-12 RX ADMIN — OXYCODONE HYDROCHLORIDE 5 MG: 5 TABLET ORAL at 03:01

## 2024-09-12 RX ADMIN — ACETAMINOPHEN 975 MG: 325 TABLET ORAL at 13:38

## 2024-09-12 RX ADMIN — ONDANSETRON 4 MG: 2 INJECTION INTRAMUSCULAR; INTRAVENOUS at 20:42

## 2024-09-12 RX ADMIN — METOPROLOL TARTRATE 25 MG: 25 TABLET, FILM COATED ORAL at 20:56

## 2024-09-12 RX ADMIN — ATORVASTATIN CALCIUM 80 MG: 80 TABLET, FILM COATED ORAL at 16:06

## 2024-09-12 RX ADMIN — FUROSEMIDE 40 MG: 10 INJECTION, SOLUTION INTRAMUSCULAR; INTRAVENOUS at 08:13

## 2024-09-12 RX ADMIN — OXYCODONE HYDROCHLORIDE 5 MG: 5 TABLET ORAL at 18:49

## 2024-09-12 RX ADMIN — HYDROMORPHONE HYDROCHLORIDE 0.5 MG: 1 INJECTION, SOLUTION INTRAMUSCULAR; INTRAVENOUS; SUBCUTANEOUS at 00:24

## 2024-09-12 RX ADMIN — MAGNESIUM SULFATE HEPTAHYDRATE 2 G: 40 INJECTION, SOLUTION INTRAVENOUS at 11:43

## 2024-09-12 RX ADMIN — HYDROMORPHONE HYDROCHLORIDE 0.5 MG: 1 INJECTION, SOLUTION INTRAMUSCULAR; INTRAVENOUS; SUBCUTANEOUS at 11:03

## 2024-09-12 RX ADMIN — ACETAMINOPHEN 975 MG: 325 TABLET ORAL at 08:31

## 2024-09-12 RX ADMIN — AMIODARONE HYDROCHLORIDE 200 MG: 200 TABLET ORAL at 21:00

## 2024-09-12 RX ADMIN — OXYCODONE HYDROCHLORIDE 5 MG: 5 TABLET ORAL at 13:38

## 2024-09-12 RX ADMIN — MUPIROCIN 1 APPLICATION: 20 OINTMENT TOPICAL at 08:14

## 2024-09-12 RX ADMIN — DOCUSATE SODIUM 100 MG: 100 CAPSULE, LIQUID FILLED ORAL at 11:40

## 2024-09-12 RX ADMIN — CEFAZOLIN SODIUM 2000 MG: 2 SOLUTION INTRAVENOUS at 00:26

## 2024-09-12 RX ADMIN — PANTOPRAZOLE SODIUM 40 MG: 40 TABLET, DELAYED RELEASE ORAL at 06:03

## 2024-09-12 RX ADMIN — POTASSIUM CHLORIDE 20 MEQ: 1500 TABLET, EXTENDED RELEASE ORAL at 08:13

## 2024-09-12 RX ADMIN — HEPARIN SODIUM 5000 UNITS: 5000 INJECTION INTRAVENOUS; SUBCUTANEOUS at 13:39

## 2024-09-12 RX ADMIN — SODIUM CHLORIDE 1.5 UNITS/HR: 9 INJECTION, SOLUTION INTRAVENOUS at 19:10

## 2024-09-12 RX ADMIN — OXYCODONE HYDROCHLORIDE 5 MG: 5 TABLET ORAL at 08:08

## 2024-09-12 RX ADMIN — ASPIRIN 325 MG ORAL TABLET 325 MG: 325 PILL ORAL at 08:13

## 2024-09-12 RX ADMIN — OXYCODONE HYDROCHLORIDE 5 MG: 5 TABLET ORAL at 22:26

## 2024-09-12 RX ADMIN — CHLORHEXIDINE GLUCONATE 0.12% ORAL RINSE 15 ML: 1.2 LIQUID ORAL at 08:14

## 2024-09-12 RX ADMIN — HYDROMORPHONE HYDROCHLORIDE 0.5 MG: 1 INJECTION, SOLUTION INTRAMUSCULAR; INTRAVENOUS; SUBCUTANEOUS at 06:01

## 2024-09-12 RX ADMIN — CHLORHEXIDINE GLUCONATE 0.12% ORAL RINSE 15 ML: 1.2 LIQUID ORAL at 18:02

## 2024-09-12 RX ADMIN — DOCUSATE SODIUM 100 MG: 100 CAPSULE, LIQUID FILLED ORAL at 18:02

## 2024-09-12 RX ADMIN — HEPARIN SODIUM 5000 UNITS: 5000 INJECTION INTRAVENOUS; SUBCUTANEOUS at 21:00

## 2024-09-12 RX ADMIN — POLYETHYLENE GLYCOL 3350 17 G: 17 POWDER, FOR SOLUTION ORAL at 08:13

## 2024-09-12 RX ADMIN — POTASSIUM CHLORIDE 20 MEQ: 1500 TABLET, EXTENDED RELEASE ORAL at 18:02

## 2024-09-12 RX ADMIN — METOPROLOL TARTRATE 25 MG: 25 TABLET, FILM COATED ORAL at 08:13

## 2024-09-12 RX ADMIN — AMIODARONE HYDROCHLORIDE 200 MG: 200 TABLET ORAL at 13:38

## 2024-09-12 RX ADMIN — CEFAZOLIN SODIUM 2000 MG: 2 SOLUTION INTRAVENOUS at 08:35

## 2024-09-12 RX ADMIN — SODIUM CHLORIDE 2.5 MG/HR: 0.9 INJECTION, SOLUTION INTRAVENOUS at 03:24

## 2024-09-12 RX ADMIN — MUPIROCIN 1 APPLICATION: 20 OINTMENT TOPICAL at 20:56

## 2024-09-12 RX ADMIN — AMIODARONE HYDROCHLORIDE 200 MG: 200 TABLET ORAL at 06:03

## 2024-09-12 RX ADMIN — ACETAMINOPHEN 975 MG: 325 TABLET ORAL at 20:56

## 2024-09-12 RX ADMIN — FUROSEMIDE 40 MG: 10 INJECTION, SOLUTION INTRAMUSCULAR; INTRAVENOUS at 16:06

## 2024-09-12 NOTE — PROGRESS NOTES
Progress Note - Cardiac Surgery   Aristoteles Toribio Reyes 58 y.o. male MRN: [id number]  Unit/Bed#: [facility]-320-01 Encounter: [id number]    Coronary artery disease. S/P coronary artery bypass grafting; POD # 2      24 Hour Events: Transferred from ICU to telemetry. No telemetry events overnight.     Medications:   Scheduled Meds:  Current Facility-Administered Medications   Medication Dose Route Frequency Provider Last Rate    acetaminophen  650 mg Rectal Q4H PRN Emily Haynes PA-C      acetaminophen  975 mg Oral Q6H While awake Emily Haynes PA-C      amiodarone  200 mg Oral Q8H KALYANI Emily Haynes PA-C      aspirin  325 mg Oral Daily Emily Haynes PA-C      atorvastatin  80 mg Oral Daily With Dinner Emily Haynes PA-C      bisacodyl  10 mg Rectal Daily PRN Emily Haynes PA-C      chlorhexidine  15 mL Mouth/Throat BID Emily Haynes PA-C      docusate sodium  100 mg Oral BID Emily Haynes PA-C      fentaNYL  50 mcg Intravenous Once Emily Haynes PA-C      furosemide  40 mg Intravenous BID (diuretic) Emily Haynes PA-C      heparin (porcine)  5,000 Units Subcutaneous Q8H KALYANI Emily Haynes PA-C      insulin regular (HumuLIN R,NovoLIN R) 1 Units/mL in sodium chloride 0.9 % 100 mL infusion  0.3-21 Units/hr Intravenous Titrated Emily Haynes PA-C 1.5 Units/hr (09/13/24 0415)    metoprolol tartrate  25 mg Oral Q12H KALYANI Emily Haynes PA-C      mupirocin  1 Application Nasal Q12H KALYANI Emily Haynes PA-C      niCARdipine  2.5-15 mg/hr Intravenous Titrated Emily Haynes PA-C Stopped (09/12/24 0900)    ondansetron  4 mg Intravenous Q6H PRN Emily Haynes PA-C      oxyCODONE  2.5 mg Oral Q4H PRN Emily Haynes PA-C      Or    oxyCODONE  5 mg Oral Q4H PRN Emily Haynes PA-C      pantoprazole  40 mg Oral Early Morning Emily Haynes PA-C      polyethylene glycol  17 g Oral Daily Emily Haynes PA-C      potassium  chloride  20 mEq Oral BID Emily Haynes PA-C      sodium chloride  20 mL/hr Intravenous Continuous Emily Haynes PA-C 20 mL/hr (09/11/24 1300)    temazepam  15 mg Oral HS PRN Emily Haynes PA-C       Continuous Infusions:insulin regular (HumuLIN R,NovoLIN R) 1 Units/mL in sodium chloride 0.9 % 100 mL infusion, 0.3-21 Units/hr, Last Rate: 1.5 Units/hr (09/13/24 0415)  niCARdipine, 2.5-15 mg/hr, Last Rate: Stopped (09/12/24 0900)  sodium chloride, 20 mL/hr, Last Rate: 20 mL/hr (09/11/24 1300)      PRN Meds:.  acetaminophen    bisacodyl    ondansetron    oxyCODONE **OR** oxyCODONE    temazepam    Vitals:   Vitals:    09/12/24 2320 09/13/24 0245 09/13/24 0600 09/13/24 0704   BP: 111/72 122/78  123/73   BP Location:       Pulse: 95 98  92   Resp: 20 18  20   Temp: 98.3 °F (36.8 °C) 98.6 °F (37 °C)  98.2 °F (36.8 °C)   TempSrc:       SpO2: 92% 91%  94%   Weight:   61.2 kg (134 lb 14.7 oz)    Height:           Telemetry: NSR; Heart Rate: 93    Respiratory:   SpO2: SpO2: 94 %, SpO2 Activity: SpO2 Activity: At Rest; Room Air    Intake/Output:     Intake/Output Summary (Last 24 hours) at 9/13/2024 0801  Last data filed at 9/13/2024 0601  Gross per 24 hour   Intake 963.44 ml   Output 2607 ml   Net -1643.56 ml        Weights:   Weight (last 2 days)       Date/Time Weight    09/13/24 0600 61.2 (134.92)    09/12/24 0552 62.5 (137.79)    09/11/24 05:09:55 58.5 (128.97)            Chest tube Output:    Mediastinal tubes: 85 mL/8 hours  205 mL/24 hours   Pleural tubes: 50 mL/8 hours  89 mL/24 hours       Results:   Results from last 7 days   Lab Units 09/13/24  0554 09/12/24  0410 09/11/24 2009 09/11/24  1244 09/11/24  1240 09/11/24  0843 09/09/24  0441   WBC Thousand/uL 14.79* 13.77*  --   --   --   --  9.05   HEMOGLOBIN g/dL 10.2* 10.1* 9.9*  --  9.6*  --  11.6*   I STAT HEMOGLOBIN   --   --   --    < >  --    < >  --    HEMATOCRIT % 30.7* 30.0* 29.4*  --  28.2*  --  33.9*   HEMATOCRIT, ISTAT   --   --   --    <  >  --    < >  --    PLATELETS Thousands/uL 240 205  --   --  183   < > 307    < > = values in this interval not displayed.     Results from last 7 days   Lab Units 09/13/24  0554 09/12/24  0417 09/11/24 2009 09/11/24  1604 09/11/24  1244 09/11/24  1240   SODIUM mmol/L 137 138  --   --   --  140   POTASSIUM mmol/L 4.3 4.2 4.7   < >  --  4.2   CHLORIDE mmol/L 105 110*  --   --   --  110*   CO2 mmol/L 20* 19*  --   --   --  22   CO2, I-STAT mmol/L  --   --   --   --  22  --    BUN mg/dL 16 17  --   --   --  16   CREATININE mg/dL 0.93 0.96  --   --   --  0.98   GLUCOSE, ISTAT mg/dl  --   --   --   --  162*  --    CALCIUM mg/dL 9.5 9.1  --   --   --  8.7    < > = values in this interval not displayed.     Recent Labs     09/13/24  0554   MG 1.9     Results from last 7 days   Lab Units 09/10/24  2334 09/10/24  1519 09/10/24  0502   PTT seconds 183* 27 94*         Point of care glucose: 120-145    Studies:  No new studies    Invasive Lines/Tubes:  Invasive Devices       Central Venous Catheter Line  Duration             CVC Central Lines 09/11/24 1 day              Peripheral Intravenous Line  Duration             Peripheral IV 09/09/24 Right;Ventral (anterior) Forearm 3 days              Line  Duration             Pacer Wires 1 day              Drain  Duration             Chest Tube 1 Left Pleural 32 Fr. 1 day    Chest Tube 2 Mediastinal 32 Fr. 1 day    Chest Tube 3 Mediastinal 24 Fr. 1 day                    Physical Exam:    General: No acute distress, Alert, and Normal appearance  HEENT/NECK:  Normocephalic. Atraumatic.  No jugular venous distention.    Cardiac: Regular rate and rhythm and No murmurs/rubs/gallops  Pulmonary:  Breath sounds clear bilaterally and No rales/rhonchi/wheezes  Abdomen:  Non-tender, Non-distended, and Normal bowel sounds  Incisions: Sternum is stable.  Incision dressed with Acticoat.  No erythema or drainage and Saphenectomy incision dressed with Acticoat.  No erythema or  drainage  Extremities: Extremities warm/dry and Trace edema B/L  Neuro: Alert and oriented X 3  Skin: Warm/Dry, without rashes or lesions.    Assessment:  Principal Problem:    CAD, multiple vessel  Active Problems:    HTN (hypertension)    NSTEMI (non-ST elevated myocardial infarction) ([facility])    CVA (cerebral vascular accident) ([facility])    Type 2 diabetes mellitus, without long-term current use of insulin ([facility])    Stroke-like symptoms old CVA on CT    S/P CABG x 2       Coronary artery disease. S/P coronary artery bypass grafting; POD # 2    Plan:    Cardiac:     S/P acute preoperative NSTEMI  Normal ventricular systolic function, EF 50%    NSR; BP well-controlled  Tachycardic    Increase to Lopressor, 50mg PO BID    Continue prophylactic Amiodarone, 200 mg PO TID    Continue ASA and Statin therapy    Epicardial pacing wires no longer required.  Remove today    Maintain central IV access today for lack of peripheral access    Continue Subcutaneous Heparin for DVT prophylaxis    Pulmonary:     Good Room air oxygen saturation; Continue incentive spirometry/Coughing/Deep breathing exercises    Chest tube drainage diminished; D/C today    Renal:     Normal preoperative renal function  Baseline creatinine 1.07  Creatinine 0.93 today, from 0.93    Intake/Output net: - 1225  mL/24 hours    Diuretic Regimen:  Transition to  PO Torsemide, 20 mg BID  Continue Potassium Chloride 20 mEq PO BID    Neuro:    Neurologically intact; No active issues     Incisional pain well controlled   Continue tylenol, 975 mg PO q 8, standing dose   Continue oxycodone, 2.5 to 5 mg PO q 4 hours prn pain    GI:    Controlled carbohydrate diet level two/Cardiac diet, with 1800 mL fluid restriction    Tolerating diet without complaint  + Flatus    Continue stool softeners and prn suppository    Continue GI prophylaxis    Endo:     Pre-Op Hgb A1C: 8.4    Remains on continuous insulin infusion  Not on injectable insulin therapy prior to open heart  surgery  Recommendation for discharge diabetes regimen pending endocrinology follow up  Bedside nursing Injectable insulin therapy education has been ordered  Endocrinology following daily    7    Hematology:     Post-operative blood count acceptable; Trend prn  Post-operative acute blood loss anemia; Hemoglobin 10.2; trend prn    8.   Disposition:        Routine postoperative recovery to this point; Anticipated discharge date: 10/15      VTE Pharmacologic Prophylaxis: VTE covered by:  heparin (porcine), Subcutaneous, 5,000 Units at 09/13/24 0603     VTE Mechanical Prophylaxis: sequential compression device    Collaborative rounds completed with supervising physician  Plan of care discussed with bedside nurse    SIGNATURE: Drew aDugherty PA-C  DATE: September 13, 2024  TIME: 8:01 AM

## 2024-09-12 NOTE — ASSESSMENT & PLAN NOTE
Is the patient currently in the state of [place]? YES    Visit mode:VIDEO    If the visit is dropped, the patient can be reconnected by: VIDEO VISIT: Text to cell phone:   Telephone Information:   Mobile [phone]       Will anyone else be joining the visit? NO  (If patient encounters technical issues they should call [phone] :[phone])    How would you like to obtain your AVS? MyChart    Are changes needed to the allergy or medication list? No    Are refills needed on medications prescribed by this physician? NO    Reason for visit: RECHECK and Video Visit  Feeling more tired than usual     Cherie GOMEZ       With NSTEMI --> MV disease on cardiac cath. s/p CABG as below  LVEF 50% on echo, no significant valvular disease.

## 2024-09-12 NOTE — RESTORATIVE TECHNICIAN NOTE
Restorative Technician Note      Patient Name: Aristoteles Toribio Reyes     Restorative Tech Visit Date: 09/12/24  Note Type: Mobility  Patient Position Upon Consult: Bedside chair  Activity Performed: Ambulated  Assistive Device: Roller walker  Patient Position at End of Consult: All needs within reach; Bedside chair

## 2024-09-12 NOTE — PLAN OF CARE
Problem: PHYSICAL THERAPY ADULT  Goal: Performs mobility at highest level of function for planned discharge setting.  See evaluation for individualized goals.  Description: Treatment/Interventions: Functional transfer training, Elevations, LE strengthening/ROM, Therapeutic exercise, Endurance training, Patient/family training, Equipment eval/education, Bed mobility, Gait training  Equipment Recommended: Walker (may need RW- TBD)       See flowsheet documentation for full assessment, interventions and recommendations.  Outcome: Progressing  Note: Prognosis: Good  Problem List: Decreased strength, Decreased endurance, Impaired balance, Decreased mobility, Decreased coordination, Pain  Assessment: Pt seen for physical therapy evaluation, portion of which was performed as a co-evaluation w/ OT due to concerns re: medical stability. PT portion of evaluation focused on mobility assessment where OT portion focused more on ADLs, self care.  Pt is a 59 y/o male w/ history.comorbidities of HTN, DM II, HLD, CVA who is now admitted as a transfer from [place].  Seen there w/ chest pain, L LE weakness.  Dx include NSTEMI, CAD.  Pt transferred here for CABG x 2, performed 9/11/24.  Due to acute medical issues, need for transfers to higher level of care, acute procedure, pain, fall risk, note unstable clinical picture.  PT consulted to assess mobility, d/c needs.  Pt presents w/ decreased functional mob, standing balance, endurance, B LE strength, barriers at home.  Pt will benefit from skilled PT to correct for the above problems. When stable, anticipate d/c home w/ no post acute care therapy needs, pending continued progress        Rehab Resource Intensity Level, PT: No post-acute rehabilitation needs    See flowsheet documentation for full assessment.

## 2024-09-12 NOTE — PLAN OF CARE
Problem: OCCUPATIONAL THERAPY ADULT  Goal: Performs self-care activities at highest level of function for planned discharge setting.  See evaluation for individualized goals.  Description: Treatment Interventions: Patient/family training, Cardiac education          See flowsheet documentation for full assessment, interventions and recommendations.   Outcome: Completed  Note: Limitation: Decreased ADL status, Decreased endurance, Decreased self-care trans, Decreased high-level ADLs  Prognosis: Good  Assessment: Pt is a 58 y.o. male who was admitted to [facility] on 9/5/2024 with CAD, multiple vessel, s/p CABG x2. Pt seen for an OT evaluation per active OT orders, CT/A-line in place.  Pt  has a past medical history of History of cardioembolic cerebrovascular accident (CVA) and History of cardioembolic cerebrovascular accident (CVA). Pt lives in a 1 level apt with 0 JEREMY, uses a tub shower and standard toilet. Pta, pt was independent w/ ADL/IADL and functional mobility, was (+) driving and was not using any DME at baseline. Currently, pt is Min Ax1 for UB ADL, Mod Ax1 for LB ADL, and completed transfers/FM w Min Ax1 with RW. Pt currently presents with impairments in the following categories -difficulty performing ADLS endurance. These impairments, as well as pt's cardiac/sternal precautions  limit pt's ability to safely engage in all baseline areas of occupation, includingbathing, dressing, toileting, and functional mobility/transfers.  The patient's raw score on the -PAC Daily Activity Inpatient Short Form is 18. A raw score of greater than or equal to 19 suggests the patient may benefit from discharge to home. Please refer to the recommendation of the Occupational Therapist for safe discharge planning. Although AMPAC is less than 19, anticipate pt will progress well and reports adequate assist at home. Pt would benefit from continued acute OT services throughout hospital course for one follow-up  treatment session focused on cardiac education. From OT standpoint, recommend home with increased social support, no further OT needs upon D/C. Pt was left seated in bedside chair with all needs within reach.     Rehab Resource Intensity Level, OT: No post-acute rehabilitation needs

## 2024-09-12 NOTE — CONSULTS
Consultation - Aristoteles Toribio Reyes 58 y.o. male MRN: [id number]    Unit/Bed#: PPHP-320-01 Encounter: [id number]      Assessment & Plan     Assessment:  This is a 58 y.o.-year-old male with type 2 diabetes now status post CABG postop day 1    Plan:  Type 2 Diabetes  History of uncontrolled diabetes on metformin and lantus 8 units outpatient currently on insulin drip requiring 37 units of insulin over the last 24 hours with minimal p.o. intake.  Patient still in ICU at this point and not eating as much.  Would recommend continuing insulin drip until having reliable p.o. intake and for adequate glucose control at this time.   - Cont insulin gtt       CC: Diabetes Consult    History of Present Illness     HPI: Aristoteles Toribio Reyes is a 58 y.o. year old male with history of type 2 diabetes, hypertension, stroke presenting with chest pain and shortness of breath on 9/2 to [facility].  Patient at that time had elevated troponins and was admitted for an NSTEMI.  He underwent cardiac cath on 9/4 which revealed multivessel disease and transferred to [facility] for CABG evaluation.  Patient is now status post CABG x 2 postop day 1 on insulin drip. Endocrinology consulted due to insulin drip and diabetes management.  Patient was diagnosed with type 2 diabetes in 2021 at that point in time was initiated on metformin at 1000 mg twice daily.  His A1c at that time was noted to be 11.6.  Patient states he sees a doctor in [place] and was started on 8 units lantus at bedtime along with the metformin. Upon arrival to [place] patient's A1c was noted to be 8.4.  Denies hypoglycemic episodes. Denies neuropathy, kidney disease or eye problems.     Inpatient consult to Endocrinology  Consult performed by: Mere Prabhakar MD  Consult ordered by: Emily Haynes PA-C        Review of Systems   Constitutional:  Negative for chills and fever.   HENT:  Negative for ear pain and sore throat.    Eyes:  Negative for pain and  visual disturbance.   Respiratory:  Negative for cough and shortness of breath.    Cardiovascular:  Negative for chest pain and palpitations.   Gastrointestinal:  Negative for abdominal pain and vomiting.   Genitourinary:  Negative for dysuria and hematuria.   Musculoskeletal:  Negative for arthralgias and back pain.   Skin:  Negative for color change and rash.   Neurological:  Negative for seizures and syncope.   All other systems reviewed and are negative.      Historical Information   Past Medical History:   Diagnosis Date    History of cardioembolic cerebrovascular accident (CVA) 09/02/2024    History of cardioembolic cerebrovascular accident (CVA) 09/02/2024     Past Surgical History:   Procedure Laterality Date    CARDIAC CATHETERIZATION N/A 9/4/2024    Procedure: Cardiac catheterization;  Surgeon: Juan Fuentes MD;  Location: [facility] CARDIAC CATH LAB;  Service: Cardiology    [place] CORONARY ARTERY BYP W/VEIN & ARTERY GRAFT 2 VEIN N/A 9/11/2024    Procedure: CORONARY ARTERY BYPASS GRAFT (CABG) 2 VESSELS, LIMA-LAD,SVG/EVH-PDA, MARY;  Surgeon: Shaun Hernandez DO;  Location:  MAIN OR;  Service: Cardiac Surgery     Social History   Social History     Substance and Sexual Activity   Alcohol Use Never     Social History     Substance and Sexual Activity   Drug Use Never     Social History     Tobacco Use   Smoking Status Some Days    Types: Cigarettes   Smokeless Tobacco Not on file     Family History: No family history on file.    Meds/Allergies   Current Facility-Administered Medications   Medication Dose Route Frequency Provider Last Rate Last Admin    acetaminophen (TYLENOL) rectal suppository 650 mg  650 mg Rectal Q4H PRN Emily Haynes PA-C        acetaminophen (TYLENOL) tablet 975 mg  975 mg Oral Q6H While awake Emily Haynes PA-C   975 mg at 09/12/24 0831    amiodarone tablet 200 mg  200 mg Oral Q8H KALYANI Emily Haynes PA-C   200 mg at 09/12/24 0603    aspirin tablet 325 mg  325 mg Oral Daily Emily  Valencia Haynes PA-C   325 mg at 09/12/24 0813    atorvastatin (LIPITOR) tablet 80 mg  80 mg Oral Daily With Dinner Emily Haynes PA-C        bisacodyl (DULCOLAX) rectal suppository 10 mg  10 mg Rectal Daily PRN Emily Haynes PA-C        chlorhexidine (PERIDEX) 0.12 % oral rinse 15 mL  15 mL Mouth/Throat BID Emily Haynes PA-C   15 mL at 09/12/24 0814    docusate sodium (COLACE) capsule 100 mg  100 mg Oral BID Emily Haynes PA-C   100 mg at 09/12/24 1140    fentaNYL injection 50 mcg  50 mcg Intravenous Once Emily Haynes PA-C        furosemide (LASIX) injection 40 mg  40 mg Intravenous BID (diuretic) Emily Haynes PA-C   40 mg at 09/12/24 0813    heparin (porcine) subcutaneous injection 5,000 Units  5,000 Units Subcutaneous Q8H [facility] Emily Haynes PA-C        insulin regular (HumuLIN R,NovoLIN R) 1 Units/mL in sodium chloride 0.9 % 100 mL infusion  0.3-21 Units/hr Intravenous Titrated Emily Haynes PA-C 3 mL/hr at 09/12/24 1043 3 Units/hr at 09/12/24 1043    magnesium sulfate 2 g/50 mL IVPB (premix) 2 g  2 g Intravenous Once Emily Haynes PA-C 25 mL/hr at 09/12/24 1143 2 g at 09/12/24 1143    metoprolol tartrate (LOPRESSOR) tablet 25 mg  25 mg Oral Q12H [facility] Emily Haynes PA-C   25 mg at 09/12/24 0813    mupirocin (BACTROBAN) 2 % nasal ointment 1 Application  1 Application Nasal Q12H [facility] Emily Haynes PA-C   1 Application at 09/12/24 0814    niCARdipine (CARDENE) 25 mg (STANDARD CONCENTRATION) in sodium chloride 0.9% 250 mL  2.5-15 mg/hr Intravenous Titrated Emily Haynes PA-C   Stopped at 09/12/24 0900    ondansetron (ZOFRAN) injection 4 mg  4 mg Intravenous Q6H PRN Emily Haynes PA-C        oxyCODONE (ROXICODONE) split tablet 2.5 mg  2.5 mg Oral Q4H PRN Emily Haynes PA-C        Or    oxyCODONE (ROXICODONE) IR tablet 5 mg  5 mg Oral Q4H PRN Emily Haynes PA-C   5 mg at 09/12/24 0808    pantoprazole (PROTONIX) EC tablet 40 mg  40  "mg Oral Early Morning Emily Haynes PA-C   40 mg at 09/12/24 0603    polyethylene glycol (MIRALAX) packet 17 g  17 g Oral Daily Emily Haynes PA-C   17 g at 09/12/24 0813    potassium chloride (Klor-Con M20) CR tablet 20 mEq  20 mEq Oral BID Emily Haynes PA-C   20 mEq at 09/12/24 0813    sodium chloride infusion 0.45 %  20 mL/hr Intravenous Continuous Emily Haynes PA-C 20 mL/hr at 09/11/24 1300 20 mL/hr at 09/11/24 1300    temazepam (RESTORIL) capsule 15 mg  15 mg Oral HS PRN Emily Haynes PA-C         No Known Allergies    Objective   Vitals: Blood pressure 127/66, pulse 82, temperature 98 °F (36.7 °C), temperature source Oral, resp. rate (!) 23, height 5' 2\" (1.575 m), weight 62.5 kg (137 lb 12.6 oz), SpO2 95%.    Intake/Output Summary (Last 24 hours) at 9/12/2024 1159  Last data filed at 9/12/2024 1103  Gross per 24 hour   Intake 3322.85 ml   Output 3084 ml   Net 238.85 ml     Invasive Devices       Central Venous Catheter Line  Duration             CVC Central Lines 09/11/24 1 day              Peripheral Intravenous Line  Duration             Peripheral IV 09/09/24 Right;Ventral (anterior) Forearm 2 days              Line  Duration             Pacer Wires 1 day              Drain  Duration             Chest Tube 1 Left Pleural 32 Fr. 1 day    Chest Tube 2 Mediastinal 32 Fr. 1 day    Chest Tube 3 Mediastinal 24 Fr. 1 day                    Physical Exam  Constitutional:       Appearance: Normal appearance.   HENT:      Nose: Nose normal.      Mouth/Throat:      Mouth: Mucous membranes are moist.      Pharynx: Oropharynx is clear.   Eyes:      Extraocular Movements: Extraocular movements intact.      Conjunctiva/sclera: Conjunctivae normal.   Cardiovascular:      Rate and Rhythm: Normal rate and regular rhythm.   Pulmonary:      Effort: Pulmonary effort is normal.      Breath sounds: Normal breath sounds.   Abdominal:      General: Abdomen is flat. Bowel sounds are normal.      " "Palpations: Abdomen is soft.   Musculoskeletal:         General: Normal range of motion.      Cervical back: Normal range of motion.      Right lower leg: No edema.      Left lower leg: No edema.   Neurological:      General: No focal deficit present.      Mental Status: He is alert and oriented to person, place, and time.       The history was obtained from the review of the chart, patient.    Lab Results:       Lab Results   Component Value Date    WBC 13.77 (H) 09/12/2024    HGB 10.1 (L) 09/12/2024    HCT 30.0 (L) 09/12/2024    MCV 94 09/12/2024     09/12/2024     Lab Results   Component Value Date/Time    BUN 17 09/12/2024 04:17 AM    K 4.2 09/12/2024 04:17 AM     (H) 09/12/2024 04:17 AM    CO2 19 (L) 09/12/2024 04:17 AM    CO2 22 09/11/2024 12:44 PM    CREATININE 0.96 09/12/2024 04:17 AM    AST 20 09/03/2024 03:52 AM    ALT 14 09/03/2024 03:52 AM    TP 6.5 09/03/2024 03:52 AM    ALB 4.1 09/03/2024 03:52 AM     No results for input(s): \"CHOL\", \"HDL\", \"LDL\", \"TRIG\", \"VLDL\" in the last 72 hours.  No results found for: \"MICROALBUR\", \"[id number]\"  POC Glucose (mg/dl)   Date Value   09/12/2024 141 (H)   09/12/2024 125   09/12/2024 153 (H)   09/12/2024 196 (H)   09/12/2024 177 (H)   09/12/2024 195 (H)   09/11/2024 169 (H)   09/11/2024 138   09/11/2024 106   09/11/2024 78       Imaging Studies: No pertinent imaging studies reviewed.    Portions of the record may have been created with voice recognition software.    "

## 2024-09-12 NOTE — PHYSICAL THERAPY NOTE
Physical Therapy Evaluation    Patient's Name: Aristoteles Toribio Reyes    Admitting Diagnosis  Triple vessel disease of the heart [I25.10]    Problem List  Patient Active Problem List   Diagnosis    HTN (hypertension)    NSTEMI (non-ST elevated myocardial infarction) (HCC)    CVA (cerebral vascular accident) (HCC)    Type 2 diabetes mellitus, without long-term current use of insulin (HCC)    CAD, multiple vessel    Stroke-like symptoms old CVA on CT    S/P CABG x 2       Past Medical History  Past Medical History:   Diagnosis Date    History of cardioembolic cerebrovascular accident (CVA) 09/02/2024    History of cardioembolic cerebrovascular accident (CVA) 09/02/2024       Past Surgical History  Past Surgical History:   Procedure Laterality Date    CARDIAC CATHETERIZATION N/A 9/4/2024    Procedure: Cardiac catheterization;  Surgeon: Juan Fuentes MD;  Location: [facility] CARDIAC C[facility] LAB;  Service: Cardiology    [place] CORONARY ARTERY BYP W/VEIN & ARTERY GRAFT 2 VEIN N/A 9/11/2024    Procedure: CORONARY ARTERY BYPASS GRAFT (CABG) 2 VESSELS, LIMA-LAD,SVG/EVH-PDA, MARY;  Surgeon: Shaun Hernandez DO;  Location: [facility];  Service: Cardiac Surgery        09/12/24 0925   PT Last Visit   PT Visit Date 09/12/24   Note Type   Note type Evaluation   Pain Assessment   Pain Assessment Tool 0-10   Pain Score 7   Pain Location/Orientation Location: Chest   Patient's Stated Pain Goal No pain   Hospital Pain Intervention(s) Ambulation/increased activity   Restrictions/Precautions   Weight Bearing Precautions Per Order No   Other Precautions Multiple lines;Telemetry;Cardiac/sternal;Fall Risk;Pain   Home Living   Type of Home House   Additional Comments Resides w/ wife in 2 story home.  Indep, works in constructions.  ambulates w/ out device.   Prior Function   Level of [place] Independent with ADLs;Independent with functional mobility   Falls in the last 6 months 0   General   Family/Caregiver Present Yes  (dtr assists w/  translation w/ pt OK)   Cognition   Overall Cognitive Status WFL   Arousal/Participation Responsive   Orientation Level Oriented X4   Memory Unable to assess   Following Commands Follows one step commands without difficulty   Subjective   Subjective states he feels OK.  some pain.  cooperative w/ eval   RLE Assessment   RLE Assessment   (strength grossly 4-/5- assessed w/ mobility)   LLE Assessment   LLE Assessment   (strength grossly 4-/5- assessed w/ mobility)   Coordination   Movements are Fluid and Coordinated 0   Coordination and Movement Description mild ataxia   Transfers   Sit to Stand 4  Minimal assistance   Additional items Assist x 1;Increased time required;Impulsive;Verbal cues   Stand to Sit 4  Minimal assistance   Additional items Assist x 1;Increased time required;Impulsive;Verbal cues   Ambulation/Elevation   Gait pattern   (slow, mild ataxia, short step length)   Gait Assistance 4  Minimal assist   Additional items Assist x 1  (w/ 2 others for lines, chair follow)   Assistive Device Rolling walker   Distance 80' w/ stanidng rest 1/2 way   Balance   Static Sitting Good   Dynamic Sitting Fair   Static Standing Fair -   Dynamic Standing Poor +   Ambulatory Poor +   Endurance Deficit   Endurance Deficit Yes   Endurance Deficit Description fatigue, weakness, pain   Activity Tolerance   Activity Tolerance Patient limited by fatigue;Patient limited by pain;Treatment limited secondary to medical complications (Comment)   Nurse Made Aware yes   Assessment   Prognosis Good   Problem List Decreased strength;Decreased endurance;Impaired balance;Decreased mobility;Decreased coordination;Pain   Assessment Pt seen for physical therapy evaluation, portion of which was performed as a co-evaluation w/ OT due to concerns re: medical stability. PT portion of evaluation focused on mobility assessment where OT portion focused more on ADLs, self care.  Pt is a 59 y/o male w/ history.comorbidities of HTN, DM II, HLD, CVA  who is now admitted as a transfer from [place].  Seen there w/ chest pain, L LE weakness.  Dx include NSTEMI, CAD.  Pt transferred here for CABG x 2, performed 9/11/24.  Due to acute medical issues, need for transfers to higher level of care, acute procedure, pain, fall risk, note unstable clinical picture.  PT consulted to assess mobility, d/c needs.  Pt presents w/ decreased functional mob, standing balance, endurance, B LE strength, barriers at home.  Pt will benefit from skilled PT to correct for the above problems. When stable, anticipate d/c home w/ no post acute care therapy needs, pending continued progress   Goals   Patient Goals to rest   STG Expiration Date 09/26/24   Short Term Goal #1 1-2 wks: bed mob and transfers w/ indep, standing balance to good/normal w/ device vs no device, ambulate 200-300 ft w/ RW vs least restrictive device and mod i, Increase B LE strength by 1/2-1 grade, ambulate 1 flight of stairs w/ indep   PT Treatment Day 0   Plan   Treatment/Interventions Functional transfer training;Elevations;LE strengthening/ROM;Therapeutic exercise;Endurance training;Patient/family training;Equipment eval/education;Bed mobility;Gait training   PT Frequency 3-5x/wk   Discharge Recommendation   Rehab Resource Intensity Level, PT No post-acute rehabilitation needs   Equipment Recommended Walker  (may need RW- TBD)   Walker Package Recommended Wheeled walker   AM-PAC Basic Mobility Inpatient   Turning in Flat Bed Without Bedrails 3   Lying on Back to Sitting on Edge of Flat Bed Without Bedrails 3   Moving Bed to Chair 3   Standing Up From Chair Using Arms 3   Walk in Room 3   Climb 3-5 Stairs With Railing 2   Basic Mobility Inpatient Raw Score 17   Basic Mobility Standardized Score 39.67   [facility] Highest Level Of Mobility   JH-HLM Goal 5: Stand one or more mins   JH-HLM Achieved 7: Walk 25 feet or more           Kip Talley PT, DPT, CSRS

## 2024-09-12 NOTE — OCCUPATIONAL THERAPY NOTE
Occupational Therapy Evaluation and Treatment     Patient Name: Aristoteles Toribio Reyes  Today's Date: 9/12/2024  Problem List  Principal Problem:    CAD, multiple vessel  Active Problems:    HTN (hypertension)    NSTEMI (non-ST elevated myocardial infarction) (HCC)    CVA (cerebral vascular accident) (HCC)    Type 2 diabetes mellitus, without long-term current use of insulin (HCC)    Stroke-like symptoms old CVA on CT    S/P CABG x 2    Past Medical History  Past Medical History:   Diagnosis Date    History of cardioembolic cerebrovascular accident (CVA) 09/02/2024    History of cardioembolic cerebrovascular accident (CVA) 09/02/2024     Past Surgical History  Past Surgical History:   Procedure Laterality Date    CARDIAC CATHETERIZATION N/A 9/4/2024    Procedure: Cardiac catheterization;  Surgeon: Juan Fuentes MD;  Location: [facility];  Service: Cardiology    MS CORONARY ARTERY BYP W/VEIN & ARTERY GRAFT 2 VEIN N/A 9/11/2024    Procedure: CORONARY ARTERY BYPASS GRAFT (CABG) 2 VESSELS, LIMA-LAD,SVG/EVH-PDA, MARY;  Surgeon: Shaun Hernandez DO;  Location: [facility];  Service: Cardiac Surgery             09/12/24 0926   OT Last Visit   OT Visit Date 09/12/24   Note Type   Note type Evaluation   Pain Assessment   Pain Assessment Tool 0-10   Pain Score 7   Pain Location/Orientation Orientation: Mid;Location: Chest;Location: Incision   Patient's Stated Pain Goal No pain   Hospital Pain Intervention(s) Repositioned;Ambulation/increased activity   Restrictions/Precautions   Weight Bearing Precautions Per Order No   Other Precautions Cardiac/sternal;Multiple lines;Telemetry;Fall Risk;Pain  (CT, A-line, Botswanan-speaking although understands some simple English, dtr also present to interpret.)   Home Living   Type of Home Apartment   Home Layout One level;Performs ADLs on one level  (0 JEREMY)   Bathroom Shower/Tub Tub/shower unit   Bathroom Toilet Standard   Bathroom Accessibility Accessible   Additional  "Comments Pt lives in a 1 level apt with 0 JEREMY, uses a tub shower and standard toilet.   Prior Function   Level of [place] Independent with ADLs;Independent with functional mobility;Independent with IADLS   Lives With Spouse   Receives Help From Family   IADLs Independent with driving;Independent with meal prep;Independent with medication management   Falls in the last 6 months 0   Vocational Full time employment   Lifestyle   Autonomy Pta pt I with ADL, IADL and functional mobility, (+)    Reciprocal Relationships supportive family   Service to Others works in construction   Intrinsic Gratification enjoys riding his bike   Subjective   Subjective \"Feel okay\"   ADL   Where Assessed Chair   Eating Assistance 5  Supervision/Setup   Grooming Assistance 5  Supervision/Setup   UB Bathing Assistance 4  Minimal Assistance   LB Bathing Assistance 3  Moderate Assistance   UB Dressing Assistance 4  Minimal Assistance   LB Dressing Assistance 3  Moderate Assistance   Toileting Assistance  4  Minimal Assistance   Functional Assistance 4  Minimal Assistance   Additional Comments Anticipate pt will progress well and has adequate assist at home. with ADLs.   Bed Mobility   Additional Comments Pt greeted and left OOB in chair with all needs within reach.   Transfers   Sit to Stand 4  Minimal assistance   Additional items Assist x 1;Verbal cues   Stand to Sit 4  Minimal assistance   Additional items Assist x 1;Verbal cues   Additional Comments with RW   Functional Mobility   Functional Mobility 4  Minimal assistance   Additional Comments Pt performs household distance mobility with MIN A x 1 with RW   Additional items Rolling walker   Balance   Static Sitting Fair   Dynamic Sitting Fair -   Static Standing Fair -   Dynamic Standing Poor +   Ambulatory Poor +   Activity Tolerance   Activity Tolerance Patient limited by fatigue;Patient limited by pain   Medical Staff Made Aware Co-eval with DPT due to medical complexity, " assist from restorative Ernestine   Nurse Made Aware RN cleared for therapy   RUE Assessment   RUE Assessment WFL   LUE Assessment   LUE Assessment WFL   Hand Function   Gross Motor Coordination Functional   Fine Motor Coordination Functional   Sensation   Light Touch No apparent deficits   Cognition   Overall Cognitive Status WFL   Arousal/Participation Cooperative;Alert   Attention Within functional limits   Orientation Level Oriented X4   Memory Within functional limits   Following Commands Follows one step commands without difficulty   Comments Pt cooperative to therapy   Assessment   Limitation Decreased ADL status;Decreased endurance;Decreased self-care trans;Decreased high-level ADLs   Prognosis Good   Assessment Pt is a 58 y.o. male who was admitted to [facility] on 9/5/2024 with CAD, multiple vessel, s/p CABG x2. Pt seen for an OT evaluation per active OT orders, CT/A-line in place.  Pt  has a past medical history of History of cardioembolic cerebrovascular accident (CVA) and History of cardioembolic cerebrovascular accident (CVA). Pt lives in a 1 level apt with 0 JEREMY, uses a tub shower and standard toilet. Pta, pt was independent w/ ADL/IADL and functional mobility, was (+) driving and was not using any DME at baseline. Currently, pt is Min Ax1 for UB ADL, Mod Ax1 for LB ADL, and completed transfers/FM w Min Ax1 with RW. Pt currently presents with impairments in the following categories -difficulty performing ADLS endurance. These impairments, as well as pt's cardiac/sternal precautions  limit pt's ability to safely engage in all baseline areas of occupation, includingbathing, dressing, toileting, and functional mobility/transfers.  The patient's raw score on the -PAC Daily Activity Inpatient Short Form is 18. A raw score of greater than or equal to 19 suggests the patient may benefit from discharge to home. Please refer to the recommendation of the Occupational Therapist for safe discharge  planning. Although AMPAC is less than 19, anticipate pt will progress well and reports adequate assist at home. Pt would benefit from continued acute OT services throughout hospital course for one follow-up treatment session focused on cardiac education. From OT standpoint, recommend home with increased social support, no further OT needs upon D/C. Pt was left seated in bedside chair with all needs within reach.   Goals   Patient Goals to feel better   Plan   Treatment Interventions Patient/family training;Cardiac education   Goal Expiration Date 09/26/24   OT Frequency 1-2x/wk   Discharge Recommendation   Rehab Resource Intensity Level, OT No post-acute rehabilitation needs   AM-PAC Daily Activity Inpatient   Lower Body Dressing 2   Bathing 3   Toileting 3   Upper Body Dressing 3   Grooming 3   Eating 4   Daily Activity Raw Score 18   Daily Activity Standardized Score (Calc for Raw Score >=11) 38.66   AM-PAC Applied Cognition Inpatient   Following a Speech/Presentation 4   Understanding Ordinary Conversation 4   Taking Medications 4   Remembering Where Things Are Placed or Put Away 4   Remembering List of 4-5 Errands 4   Taking Care of Complicated Tasks 4   Applied Cognition Raw Score 24   Applied Cognition Standardized Score 62.21   Additional Treatment Session   Start Time 1051   End Time 1101   Treatment Assessment Pt and family participated in additional OT session focusing on cardiac education. Provided pt with Haitian version of Recovering After Cardiac Surgery packet and educated pt regarding; sternal precautions, cardiac precautions, lifiting restrictions, safe activity engagement, energy conservation, lifestyle modifications, stress management and cardiac rehabilitation programs. Pt's questions were addressed after discussion of the packet. Provided pt with contact information for OT department if questions arrise. Pt is limited by decreased endurance and decreased ability to complete higher level ADLs,  however his spouse, family will be home and able to assist as needed upon d/c. Rec pt cont participation in self care after s/u w/ nrsg staff and cont mobility w/ non OT staff while in the hospital. Pt denies any questions or concerns from an OT standpoint at this time. Rec pt return home w/ increased family support upon d/c. D/C OT.   Additional Treatment Day 1   End of Consult   Education Provided Yes;Family or social support of family present for education by provider   Patient Position at End of Consult Bedside chair;All needs within reach   Nurse Communication Nurse aware of consult       OT Goals    - Pt will recall all cardiac precautions during OT sessions to promote safety following hospital stay.    - Pt will verbalize and demonstrate understanding to cardiac packet following education in order to promote safety following hospital stay.        GILBERT Ruiz, OTR/L

## 2024-09-12 NOTE — ASSESSMENT & PLAN NOTE
S/p LIMA-LAD and SVG-RPDA on 9/11  Final intraoperative MARY: EF 50-55% with improved RWMA  Rhythm management: Amiodarone 200 mg TID and Lopressor 25 mg BID  Volume management: IV Lasix 40 mg BID K+ supplement.  Net output -3.6 L  Hemodynamics: Remains with EPW and chest tubes.  On nicardipine infusion.  Aspirin, beta-blocker, statin

## 2024-09-12 NOTE — PROGRESS NOTES
Progress Note - Cardiology   Name: Aristoteles Toribio Reyes 58 y.o. 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 s/p CABG as below  LVEF 50% on echo, no significant valvular disease.  S/P CABG x 2  S/p LIMA-LAD and SVG-RPDA on 9/11  Final intraoperative MARY: EF 50-55% with improved RWMA  Rhythm management: Amiodarone 200 mg TID and Lopressor 25 mg BID  Volume management: IV Lasix 40 mg BID K+ supplement.  Net output -3.6 L  Hemodynamics: Remains with EPW and chest tubes.  On nicardipine infusion.  Aspirin, beta-blocker, statin  HTN (hypertension)  Average /64 on nicardipine infusion Lopressor 25 mg BID  Type 2 diabetes mellitus, without long-term current use of insulin ([facility])  Hemoglobin A1c 8.4.  Management per primary team.  Stroke-like symptoms old CVA on CT  Evaluated by neurology and cleared for AC/AP  Neurology signed off at [facility]    Plan/Recommendations:  Continue IV diuretics for negative fluid balance  Monitor I&O's, renal function, electrolytes and weight  Maintain potassium >4 and magnesium >2  Wean nicardipine infusion as able  Continue aspirin, beta-blocker and statin  Continue to monitor on telemetry  Recommend ambulation and incentive spirometry    _____________________________________________________________________    Subjective    Patient seen and examined.  No acute events overnight.    Review of Systems   Constitutional: Positive for malaise/fatigue. Negative for chills.   Cardiovascular:  Positive for chest pain. Negative for dyspnea on exertion, leg swelling, near-syncope, orthopnea, palpitations, paroxysmal  "nocturnal dyspnea and syncope.   Respiratory: Negative.  Negative for cough, shortness of breath and wheezing.    Endocrine: Negative.    Hematologic/Lymphatic: Negative.    Skin: Negative.    Musculoskeletal: Negative.    Gastrointestinal: Negative.  Negative for diarrhea, nausea and vomiting.   Neurological:  Negative for dizziness, light-headedness and weakness.   Psychiatric/Behavioral: Negative.  Negative for altered mental status.    All other systems reviewed and are negative.      Objective:   Vitals: Blood pressure 125/57, pulse 89, temperature 98.3 °F (36.8 °C), temperature source Oral, resp. rate 21, height 5' 2\" (1.575 m), weight 62.5 kg (137 lb 12.6 oz), SpO2 98%.,     Wt Readings from Last 3 Encounters:   24 62.5 kg (137 lb 12.6 oz)   24 60.8 kg (134 lb)   24 60.8 kg (134 lb)        Lab Results   Component Value Date    CREATININE 0.96 2024    CREATININE 0.98 2024    CREATININE 0.99 2024         Body mass index is 25.2 kg/m².,     Systolic (24hrs), Av , Min:111 , Max:125     Diastolic (24hrs), Av, Min:57, Max:71          Intake/Output Summary (Last 24 hours) at 2024 0849  Last data filed at 2024 0832  Gross per 24 hour   Intake 2957.74 ml   Output 2315 ml   Net 642.74 ml     Weight (last 2 days)       Date/Time Weight    24 0552 62.5 (137.79)    24 05:09:55 58.5 (128.97)              Telemetry Review: No significant arrhythmias seen on telemetry review.       Physical Exam  Vitals and nursing note reviewed.   Constitutional:       General: He is not in acute distress.     Appearance: He is well-developed.      Comments: On RA in NAD   HENT:      Head: Normocephalic and atraumatic.   Neck:      Vascular: No JVD.      Comments: Invasive lines noted in right neck  Cardiovascular:      Rate and Rhythm: Normal rate and regular rhythm.      Heart sounds: Normal heart sounds. No murmur heard.     No friction rub. No gallop.   Pulmonary:      " Effort: Pulmonary effort is normal. No respiratory distress.      Breath sounds: No wheezing or rales.      Comments: Diminished breath sounds at the bases bilaterally  Chest:      Chest wall: No tenderness.   Abdominal:      General: Bowel sounds are normal. There is no distension.      Palpations: Abdomen is soft.      Tenderness: There is no abdominal tenderness.   Musculoskeletal:         General: No tenderness. Normal range of motion.      Cervical back: Normal range of motion and neck supple.      Right lower leg: No edema.      Left lower leg: No edema.   Skin:     General: Skin is warm and dry.      Coloration: Skin is not pale.      Findings: No erythema.      Comments: Sternal incision with occlusive dressing   Neurological:      Mental Status: He is alert and oriented to person, place, and time.   Psychiatric:         Mood and Affect: Mood normal.         Behavior: Behavior normal.         Thought Content: Thought content normal.         Judgment: Judgment normal.         LABORATORY RESULTS      CBC with diff:   Results from last 7 days   Lab Units 09/12/24  0410 09/11/24 2009 09/11/24  1244 09/11/24  1240 09/11/24  1128 09/11/24  1117 09/11/24  1109 09/11/24  1042 09/11/24  0843 09/09/24  0441 09/08/24  0451 09/07/24  0437 09/06/24  0453   WBC Thousand/uL 13.77*  --   --   --   --   --   --   --   --  9.05 6.86 7.71 6.78   HEMOGLOBIN g/dL 10.1* 9.9*  --  9.6*  --   --   --   --   --  11.6* 11.1* 11.6* 11.5*   I STAT HEMOGLOBIN g/dl  --   --  9.2*  --  8.2*  --  7.8* 7.1*   < >  --   --   --   --    HEMATOCRIT % 30.0* 29.4*  --  28.2*  --   --   --   --   --  33.9* 32.2* 33.4* 33.6*   HEMATOCRIT, ISTAT %  --   --  27*  --  24*  --  23* 21*   < >  --   --   --   --    MCV fL 94  --   --   --   --   --   --   --   --  89 90 89 90   PLATELETS Thousands/uL 205  --   --  183  --  218  --   --   --  307 295 286 286   RBC Million/uL 3.20*  --   --   --   --   --   --   --   --  3.80* 3.58* 3.75* 3.74*   MCH pg  31.6  --   --   --   --   --   --   --   --  30.5 31.0 30.9 30.7   MCHC g/dL 33.7  --   --   --   --   --   --   --   --  34.2 34.5 34.7 34.2   RDW % 13.3  --   --   --   --   --   --   --   --  12.8 12.7 12.5 12.5   MPV fL 10.9  --   --  10.5  --  10.2  --   --   --  11.1 11.8 11.0 10.7   NRBC AUTO /100 WBCs  --   --   --   --   --   --   --   --   --  0 0 0 0    < > = values in this interval not displayed.       CMP:  Results from last 7 days   Lab Units 09/12/24  0417 09/11/24 2009 09/11/24  1604 09/11/24  1244 09/11/24  1240 09/11/24  1128 09/11/24  1109 09/11/24  1042 09/11/24  1009 09/11/24  0843 09/11/24  0843 09/09/24  0441 09/06/24  0453   POTASSIUM mmol/L 4.2 4.7 3.5  --  4.2  --   --   --   --   --   --  4.0 4.0   CHLORIDE mmol/L 110*  --   --   --  110*  --   --   --   --   --   --  108 111*   CO2 mmol/L 19*  --   --   --  22  --   --   --   --   --   --  21 20*   CO2, I-STAT mmol/L  --   --   --  22 --  24 25 26 26   < > 27  --   --    BUN mg/dL 17  --   --   --  16  --   --   --   --   --   --  22 19   CREATININE mg/dL 0.96  --   --   --  0.98  --   --   --   --   --   --  0.99 0.91   GLUCOSE, ISTAT mg/dl  --   --   --  162*  --  187* 159* 164* 179*  --  143*  --   --    CALCIUM mg/dL 9.1  --   --   --  8.7  --   --   --   --   --   --  9.9 8.8   EGFR ml/min/1.73sq m 86  --   --   --  84  --   --   --   --   --   --  83 92    < > = values in this interval not displayed.       BMP:  Results from last 7 days   Lab Units 09/12/24  0417 09/11/24 2009 09/11/24  1604 09/11/24  1244 09/11/24  1240 09/11/24  1128 09/11/24  1109 09/11/24  1042 09/11/24  1009 09/11/24  0843 09/09/24  0441 09/06/24  0453   POTASSIUM mmol/L 4.2 4.7 3.5  --  4.2  --   --   --   --   --  4.0 4.0   CHLORIDE mmol/L 110*  --   --   --  110*  --   --   --   --   --  108 111*   CO2 mmol/L 19*  --   --   --  22  --   --   --   --   --  21 20*   CO2, I-STAT mmol/L  --   --   --  22 --  24 25 26 26   < >  --   --    BUN mg/dL 17  --    "--   --  16  --   --   --   --   --  22 19   CREATININE mg/dL 0.96  --   --   --  0.98  --   --   --   --   --  0.99 0.91   GLUCOSE, ISTAT mg/dl  --   --   --  162*  --  187* 159* 164* 179*   < >  --   --    CALCIUM mg/dL 9.1  --   --   --  8.7  --   --   --   --   --  9.9 8.8    < > = values in this interval not displayed.       No results found for: \"NTBNP\"          Results from last 7 days   Lab Units 09/12/24  0410   MAGNESIUM mg/dL 2.2       Lipid Profile:   No results found for: \"CHOL\"  Lab Results   Component Value Date    HDL 49 09/02/2024     Lab Results   Component Value Date    LDLCALC 76 09/02/2024     Lab Results   Component Value Date    TRIG 185 (H) 09/02/2024         Meds/Allergies   all current active meds have been reviewed, current meds:   Current Facility-Administered Medications:     acetaminophen (TYLENOL) rectal suppository 650 mg, Q4H PRN    acetaminophen (TYLENOL) tablet 975 mg, Q6H While awake    amiodarone tablet 200 mg, Q8H KALYANI    aspirin tablet 325 mg, Daily    atorvastatin (LIPITOR) tablet 80 mg, Daily With Dinner    bisacodyl (DULCOLAX) rectal suppository 10 mg, Daily PRN    calcium gluconate 2 g in sodium chloride 0.9% 100 mL (premix), Once PRN    ceFAZolin (ANCEF) IVPB (premix in dextrose) 2,000 mg 50 mL, Q8H, Last Rate: 2,000 mg (09/12/24 0835)    chlorhexidine (PERIDEX) 0.12 % oral rinse 15 mL, BID    dexmedeTOMIDine (Precedex) 400 mcg in sodium chloride 0.9% 100 mL, Titrated, Last Rate: Stopped (09/11/24 1442)    fentaNYL injection 50 mcg, Once    fentaNYL injection 50 mcg, Q1H PRN    furosemide (LASIX) injection 40 mg, BID (diuretic)    heparin (porcine) subcutaneous injection 5,000 Units, Q8H KALYANI **AND** [CANCELED] Platelet count, Once    HYDROmorphone (DILAUDID) injection 0.5 mg, Q2H PRN    insulin regular (HumuLIN R,NovoLIN R) 1 Units/mL in sodium chloride 0.9 % 100 mL infusion, Titrated, Last Rate: 3 Units/hr (09/12/24 0610)    lactated ringers bolus 500 mL, Once PRN    " metoprolol tartrate (LOPRESSOR) tablet 25 mg, Q12H KALYANI    mupirocin (BACTROBAN) 2 % nasal ointment 1 Application, Q12H KALYANI    niCARdipine (CARDENE) 25 mg (STANDARD CONCENTRATION) in sodium chloride 0.9% 250 mL, Titrated, Last Rate: 2.5 mg/hr (09/12/24 0838)    ondansetron (ZOFRAN) injection 4 mg, Q6H PRN    oxyCODONE (ROXICODONE) IR tablet 2.5 mg, Q4H PRN **OR** oxyCODONE (ROXICODONE) IR tablet 5 mg, Q4H PRN    pantoprazole (PROTONIX) EC tablet 40 mg, Early Morning    phenylephrine (MARGARITO-SYNEPHRINE) 50 mg (STANDARD CONCENTRATION) in sodium chloride 0.9% 250 mL, Titrated, Last Rate: Stopped (09/11/24 1536)    polyethylene glycol (MIRALAX) packet 17 g, Daily    potassium chloride (Klor-Con M20) CR tablet 20 mEq, BID    potassium chloride 20 mEq IVPB (premix), Once PRN    potassium chloride 40 mEq IVPB (premix), Once PRN    sodium chloride infusion 0.45 %, Continuous, Last Rate: 20 mL/hr (09/11/24 1300), and PTA meds:   Prior to Admission Medications   Prescriptions Last Dose Informant Patient Reported? Taking?   lisinopril (ZESTRIL) 10 mg tablet   Yes No   Sig: Take 10 mg by mouth daily   metFORMIN (GLUCOPHAGE) 1000 MG tablet   Yes No   Sig: Take 1,000 mg by mouth 2 (two) times a day with meals      Facility-Administered Medications: None       Medications Prior to Admission:     lisinopril (ZESTRIL) 10 mg tablet    metFORMIN (GLUCOPHAGE) 1000 MG tablet    dexmedetomidine, 0.1-0.7 mcg/kg/hr, Last Rate: Stopped (09/11/24 1442)  insulin regular (HumuLIN R,NovoLIN R) 1 Units/mL in sodium chloride 0.9 % 100 mL infusion, 0.3-21 Units/hr, Last Rate: 3 Units/hr (09/12/24 0610)  niCARdipine, 2.5-15 mg/hr, Last Rate: 2.5 mg/hr (09/12/24 0838)  phenylephine,  mcg/min, Last Rate: Stopped (09/11/24 1536)  sodium chloride, 20 mL/hr, Last Rate: 20 mL/hr (09/11/24 1300)        Counseling / Coordination of Care  Total floor / unit time spent today 20 minutes.  Greater than 50% of total time was spent with the patient and / or  family counseling and / or coordination of care.      ** Please Note: Dragon 360 Dictation voice to text software may have been used in the creation of this document. **

## 2024-09-13 LAB
ANION GAP SERPL CALCULATED.3IONS-SCNC: 12 MMOL/L (ref 4–13)
ATRIAL RATE: 88 BPM
ATRIAL RATE: 93 BPM
BUN SERPL-MCNC: 16 MG/DL (ref 5–25)
CALCIUM SERPL-MCNC: 9.5 MG/DL (ref 8.4–10.2)
CHLORIDE SERPL-SCNC: 105 MMOL/L (ref 96–108)
CO2 SERPL-SCNC: 20 MMOL/L (ref 21–32)
CREAT SERPL-MCNC: 0.93 MG/DL (ref 0.6–1.3)
ERYTHROCYTE [DISTWIDTH] IN BLOOD BY AUTOMATED COUNT: 13.6 % (ref 11.6–15.1)
GFR SERPL CREATININE-BSD FRML MDRD: 90 ML/MIN/1.73SQ M
GLUCOSE SERPL-MCNC: 115 MG/DL (ref 65–140)
GLUCOSE SERPL-MCNC: 118 MG/DL (ref 65–140)
GLUCOSE SERPL-MCNC: 120 MG/DL (ref 65–140)
GLUCOSE SERPL-MCNC: 126 MG/DL (ref 65–140)
GLUCOSE SERPL-MCNC: 138 MG/DL (ref 65–140)
GLUCOSE SERPL-MCNC: 145 MG/DL (ref 65–140)
GLUCOSE SERPL-MCNC: 149 MG/DL (ref 65–140)
GLUCOSE SERPL-MCNC: 165 MG/DL (ref 65–140)
GLUCOSE SERPL-MCNC: 175 MG/DL (ref 65–140)
GLUCOSE SERPL-MCNC: 252 MG/DL (ref 65–140)
GLUCOSE SERPL-MCNC: 277 MG/DL (ref 65–140)
GLUCOSE SERPL-MCNC: 95 MG/DL (ref 65–140)
HCT VFR BLD AUTO: 30.7 % (ref 36.5–49.3)
HGB BLD-MCNC: 10.2 G/DL (ref 12–17)
MAGNESIUM SERPL-MCNC: 1.9 MG/DL (ref 1.9–2.7)
MCH RBC QN AUTO: 30.9 PG (ref 26.8–34.3)
MCHC RBC AUTO-ENTMCNC: 33.2 G/DL (ref 31.4–37.4)
MCV RBC AUTO: 93 FL (ref 82–98)
P AXIS: 51 DEGREES
P AXIS: 61 DEGREES
PLATELET # BLD AUTO: 240 THOUSANDS/UL (ref 149–390)
PMV BLD AUTO: 11.6 FL (ref 8.9–12.7)
POTASSIUM SERPL-SCNC: 4.3 MMOL/L (ref 3.5–5.3)
PR INTERVAL: 126 MS
PR INTERVAL: 130 MS
QRS AXIS: 16 DEGREES
QRS AXIS: 48 DEGREES
QRSD INTERVAL: 80 MS
QRSD INTERVAL: 92 MS
QT INTERVAL: 396 MS
QT INTERVAL: 410 MS
QTC INTERVAL: 492 MS
QTC INTERVAL: 496 MS
RBC # BLD AUTO: 3.3 MILLION/UL (ref 3.88–5.62)
SODIUM SERPL-SCNC: 137 MMOL/L (ref 135–147)
T WAVE AXIS: 51 DEGREES
T WAVE AXIS: 83 DEGREES
VENTRICULAR RATE: 88 BPM
VENTRICULAR RATE: 93 BPM
WBC # BLD AUTO: 14.79 THOUSAND/UL (ref 4.31–10.16)

## 2024-09-13 PROCEDURE — 80048 BASIC METABOLIC PNL TOTAL CA: CPT | Performed by: PHYSICIAN ASSISTANT

## 2024-09-13 PROCEDURE — 82948 REAGENT STRIP/BLOOD GLUCOSE: CPT

## 2024-09-13 PROCEDURE — 83735 ASSAY OF MAGNESIUM: CPT | Performed by: PHYSICIAN ASSISTANT

## 2024-09-13 PROCEDURE — 97530 THERAPEUTIC ACTIVITIES: CPT

## 2024-09-13 PROCEDURE — 85027 COMPLETE CBC AUTOMATED: CPT | Performed by: PHYSICIAN ASSISTANT

## 2024-09-13 PROCEDURE — 99232 SBSQ HOSP IP/OBS MODERATE 35: CPT | Performed by: PHYSICIAN ASSISTANT

## 2024-09-13 PROCEDURE — 99232 SBSQ HOSP IP/OBS MODERATE 35: CPT | Performed by: INTERNAL MEDICINE

## 2024-09-13 PROCEDURE — 99024 POSTOP FOLLOW-UP VISIT: CPT | Performed by: PHYSICIAN ASSISTANT

## 2024-09-13 PROCEDURE — 93010 ELECTROCARDIOGRAM REPORT: CPT | Performed by: INTERNAL MEDICINE

## 2024-09-13 RX ORDER — INSULIN LISPRO 100 [IU]/ML
5 INJECTION, SOLUTION INTRAVENOUS; SUBCUTANEOUS
Status: DISCONTINUED | OUTPATIENT
Start: 2024-09-13 | End: 2024-09-15 | Stop reason: HOSPADM

## 2024-09-13 RX ORDER — METOPROLOL TARTRATE 50 MG
50 TABLET ORAL EVERY 12 HOURS SCHEDULED
Status: DISCONTINUED | OUTPATIENT
Start: 2024-09-13 | End: 2024-09-15 | Stop reason: HOSPADM

## 2024-09-13 RX ORDER — INSULIN LISPRO 100 [IU]/ML
1-5 INJECTION, SOLUTION INTRAVENOUS; SUBCUTANEOUS
Status: DISCONTINUED | OUTPATIENT
Start: 2024-09-14 | End: 2024-09-15 | Stop reason: HOSPADM

## 2024-09-13 RX ORDER — TORSEMIDE 20 MG/1
20 TABLET ORAL 2 TIMES DAILY
Status: DISCONTINUED | OUTPATIENT
Start: 2024-09-13 | End: 2024-09-15 | Stop reason: HOSPADM

## 2024-09-13 RX ORDER — INSULIN LISPRO 100 [IU]/ML
1-6 INJECTION, SOLUTION INTRAVENOUS; SUBCUTANEOUS
Status: DISCONTINUED | OUTPATIENT
Start: 2024-09-14 | End: 2024-09-15 | Stop reason: HOSPADM

## 2024-09-13 RX ORDER — INSULIN GLARGINE 100 [IU]/ML
20 INJECTION, SOLUTION SUBCUTANEOUS
Status: DISCONTINUED | OUTPATIENT
Start: 2024-09-13 | End: 2024-09-15 | Stop reason: HOSPADM

## 2024-09-13 RX ADMIN — POTASSIUM CHLORIDE 20 MEQ: 1500 TABLET, EXTENDED RELEASE ORAL at 08:40

## 2024-09-13 RX ADMIN — AMIODARONE HYDROCHLORIDE 200 MG: 200 TABLET ORAL at 21:47

## 2024-09-13 RX ADMIN — HEPARIN SODIUM 5000 UNITS: 5000 INJECTION INTRAVENOUS; SUBCUTANEOUS at 13:43

## 2024-09-13 RX ADMIN — ASPIRIN 325 MG ORAL TABLET 325 MG: 325 PILL ORAL at 08:40

## 2024-09-13 RX ADMIN — METOPROLOL TARTRATE 50 MG: 50 TABLET, FILM COATED ORAL at 21:47

## 2024-09-13 RX ADMIN — HEPARIN SODIUM 5000 UNITS: 5000 INJECTION INTRAVENOUS; SUBCUTANEOUS at 06:03

## 2024-09-13 RX ADMIN — AMIODARONE HYDROCHLORIDE 200 MG: 200 TABLET ORAL at 05:58

## 2024-09-13 RX ADMIN — POTASSIUM CHLORIDE 20 MEQ: 1500 TABLET, EXTENDED RELEASE ORAL at 17:13

## 2024-09-13 RX ADMIN — CHLORHEXIDINE GLUCONATE 0.12% ORAL RINSE 15 ML: 1.2 LIQUID ORAL at 08:41

## 2024-09-13 RX ADMIN — MUPIROCIN 1 APPLICATION: 20 OINTMENT TOPICAL at 08:40

## 2024-09-13 RX ADMIN — DOCUSATE SODIUM 100 MG: 100 CAPSULE, LIQUID FILLED ORAL at 08:40

## 2024-09-13 RX ADMIN — ACETAMINOPHEN 975 MG: 325 TABLET ORAL at 08:40

## 2024-09-13 RX ADMIN — AMIODARONE HYDROCHLORIDE 200 MG: 200 TABLET ORAL at 13:43

## 2024-09-13 RX ADMIN — PANTOPRAZOLE SODIUM 40 MG: 40 TABLET, DELAYED RELEASE ORAL at 05:58

## 2024-09-13 RX ADMIN — MUPIROCIN 1 APPLICATION: 20 OINTMENT TOPICAL at 21:47

## 2024-09-13 RX ADMIN — TORSEMIDE 20 MG: 20 TABLET ORAL at 08:40

## 2024-09-13 RX ADMIN — CHLORHEXIDINE GLUCONATE 0.12% ORAL RINSE 15 ML: 1.2 LIQUID ORAL at 21:47

## 2024-09-13 RX ADMIN — OXYCODONE HYDROCHLORIDE 5 MG: 5 TABLET ORAL at 19:39

## 2024-09-13 RX ADMIN — POLYETHYLENE GLYCOL 3350 17 G: 17 POWDER, FOR SOLUTION ORAL at 08:41

## 2024-09-13 RX ADMIN — OXYCODONE HYDROCHLORIDE 5 MG: 5 TABLET ORAL at 05:58

## 2024-09-13 RX ADMIN — INSULIN GLARGINE 20 UNITS: 100 INJECTION, SOLUTION SUBCUTANEOUS at 21:47

## 2024-09-13 RX ADMIN — HEPARIN SODIUM 5000 UNITS: 5000 INJECTION INTRAVENOUS; SUBCUTANEOUS at 21:47

## 2024-09-13 RX ADMIN — ACETAMINOPHEN 975 MG: 325 TABLET ORAL at 13:43

## 2024-09-13 RX ADMIN — INSULIN LISPRO 5 UNITS: 100 INJECTION, SOLUTION INTRAVENOUS; SUBCUTANEOUS at 17:13

## 2024-09-13 RX ADMIN — DOCUSATE SODIUM 100 MG: 100 CAPSULE, LIQUID FILLED ORAL at 17:13

## 2024-09-13 RX ADMIN — TORSEMIDE 20 MG: 20 TABLET ORAL at 17:13

## 2024-09-13 RX ADMIN — METOPROLOL TARTRATE 50 MG: 50 TABLET, FILM COATED ORAL at 08:40

## 2024-09-13 RX ADMIN — ACETAMINOPHEN 975 MG: 325 TABLET ORAL at 19:38

## 2024-09-13 RX ADMIN — ATORVASTATIN CALCIUM 80 MG: 80 TABLET, FILM COATED ORAL at 17:13

## 2024-09-13 NOTE — PROGRESS NOTES
Progress Note - Cardiology   Name: Aristoteles Toribio Reyes 58 y.o. 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 s/p CABG as below  LVEF 50% on echo, no significant valvular disease.  S/P CABG x 2  S/p LIMA-LAD and SVG-RPDA on 9/11  Final intraoperative MARY: EF 50-55% with improved RWMA  Rhythm management: Amiodarone 200 mg TID and Lopressor 50 mg BID  Volume management: IV Lasix transition to torsemide 20 mg BID today with K+ supplement.  Net output -4.8 L  Hemodynamics: Plan to discontinue EPW and chest tubes today  Remains on aspirin, beta-blocker, statin  HTN (hypertension)  Stable with average /70 on Lopressor 50 mg BID  Type 2 diabetes mellitus, without long-term current use of insulin ([facility])  Hemoglobin A1c 8.4.  Management per primary team.  Stroke-like symptoms old CVA on CT  Evaluated by neurology and cleared for AC/AP  Neurology signed off at [facility]    Plan/Recommendations:  Continue oral diuretic for negative fluid balance  Monitor I&O's, renal function, electrolytes and weight  Maintain potassium >4 and magnesium >2  Continue aspirin, beta-blocker and statin  Continue to monitor on telemetry  Recommend ambulation and incentive spirometry  Anticipated discharge 9/15    _____________________________________________________________________    Subjective    Patient seen and examined.  No acute events overnight.    Review of Systems   Constitutional: Negative. Negative for chills.   Cardiovascular:  Negative for chest pain, dyspnea on exertion, leg swelling, near-syncope, orthopnea, palpitations, paroxysmal nocturnal dyspnea and  "syncope.   Respiratory:  Positive for shortness of breath. Negative for cough and wheezing.    Endocrine: Negative.    Hematologic/Lymphatic: Negative.    Skin: Negative.    Musculoskeletal: Negative.    Gastrointestinal: Negative.  Negative for diarrhea, nausea and vomiting.   Neurological:  Negative for dizziness, light-headedness and weakness.   Psychiatric/Behavioral: Negative.  Negative for altered mental status.    All other systems reviewed and are negative.      Objective:   Vitals: Blood pressure 123/73, pulse 92, temperature 98.2 °F (36.8 °C), resp. rate 20, height 5' 2\" (1.575 m), weight 61.2 kg (134 lb 14.7 oz), SpO2 94%.,     Wt Readings from Last 3 Encounters:   24 61.2 kg (134 lb 14.7 oz)   24 60.8 kg (134 lb)   24 60.8 kg (134 lb)        Lab Results   Component Value Date    CREATININE 0.93 2024    CREATININE 0.96 2024    CREATININE 0.98 2024         Body mass index is 24.68 kg/m².,     Systolic (24hrs), Av , Min:99 , Max:135     Diastolic (24hrs), Av, Min:57, Max:79          Intake/Output Summary (Last 24 hours) at 2024 0829  Last data filed at 2024 0601  Gross per 24 hour   Intake 963.44 ml   Output 2607 ml   Net -1643.56 ml     Weight (last 2 days)       Date/Time Weight    24 0600 61.2 (134.92)    24 0552 62.5 (137.79)    24 05:09:55 58.5 (128.97)              Telemetry Review: No significant arrhythmias seen on telemetry review.       Physical Exam  Vitals and nursing note reviewed.   Constitutional:       General: He is not in acute distress.     Appearance: He is well-developed.      Comments: On RA in NAD   HENT:      Head: Normocephalic and atraumatic.   Neck:      Vascular: No JVD.      Comments: Invasive lines noted in right neck  Cardiovascular:      Rate and Rhythm: Normal rate and regular rhythm.      Heart sounds: Normal heart sounds. No murmur heard.     No friction rub. No gallop.   Pulmonary:      Effort: " Pulmonary effort is normal. No respiratory distress.      Breath sounds: No wheezing or rales.      Comments: Diminished breath sounds at the bases bilaterally  Chest:      Chest wall: No tenderness.   Abdominal:      General: Bowel sounds are normal. There is no distension.      Palpations: Abdomen is soft.      Tenderness: There is no abdominal tenderness.   Musculoskeletal:         General: No tenderness. Normal range of motion.      Cervical back: Normal range of motion and neck supple.      Right lower leg: No edema.      Left lower leg: No edema.   Skin:     General: Skin is warm and dry.      Coloration: Skin is not pale.      Findings: No erythema.      Comments: Sternal incision with occlusive dressing   Neurological:      Mental Status: He is alert and oriented to person, place, and time.   Psychiatric:         Mood and Affect: Mood normal.         Behavior: Behavior normal.         Thought Content: Thought content normal.         Judgment: Judgment normal.         LABORATORY RESULTS      CBC with diff:   Results from last 7 days   Lab Units 09/13/24  0554 09/12/24  0410 09/11/24 2009 09/11/24  1244 09/11/24  1240 09/11/24  1128 09/11/24  1117 09/11/24  1109 09/11/24  0843 09/09/24  0441 09/08/24  0451 09/07/24  0437   WBC Thousand/uL 14.79* 13.77*  --   --   --   --   --   --   --  9.05 6.86 7.71   HEMOGLOBIN g/dL 10.2* 10.1* 9.9*  --  9.6*  --   --   --   --  11.6* 11.1* 11.6*   I STAT HEMOGLOBIN g/dl  --   --   --  9.2*  --  8.2*  --  7.8*   < >  --   --   --    HEMATOCRIT % 30.7* 30.0* 29.4*  --  28.2*  --   --   --   --  33.9* 32.2* 33.4*   HEMATOCRIT, ISTAT %  --   --   --  27*  --  24*  --  23*   < >  --   --   --    MCV fL 93 94  --   --   --   --   --   --   --  89 90 89   PLATELETS Thousands/uL 240 205  --   --  183  --  218  --   --  307 295 286   RBC Million/uL 3.30* 3.20*  --   --   --   --   --   --   --  3.80* 3.58* 3.75*   MCH pg 30.9 31.6  --   --   --   --   --   --   --  30.5 31.0 30.9    MCHC g/dL 33.2 33.7  --   --   --   --   --   --   --  34.2 34.5 34.7   RDW % 13.6 13.3  --   --   --   --   --   --   --  12.8 12.7 12.5   MPV fL 11.6 10.9  --   --  10.5  --  10.2  --   --  11.1 11.8 11.0   NRBC AUTO /100 WBCs  --   --   --   --   --   --   --   --   --  0 0 0    < > = values in this interval not displayed.       CMP:  Results from last 7 days   Lab Units 09/13/24  0554 09/12/24  0417 09/11/24 2009 09/11/24  1604 09/11/24  1244 09/11/24  1240 09/11/24  1128 09/11/24  1109 09/11/24  1042 09/11/24  1009 09/11/24  0843 09/11/24  0843 09/09/24  0441   POTASSIUM mmol/L 4.3 4.2 4.7 3.5  --  4.2  --   --   --   --   --   --  4.0   CHLORIDE mmol/L 105 110*  --   --   --  110*  --   --   --   --   --   --  108   CO2 mmol/L 20* 19*  --   --   --  22  --   --   --   --   --   --  21   CO2, I-STAT mmol/L  --   --   --   --  22  --  24 25 26 26   < > 27  --    BUN mg/dL 16 17  --   --   --  16  --   --   --   --   --   --  22   CREATININE mg/dL 0.93 0.96  --   --   --  0.98  --   --   --   --   --   --  0.99   GLUCOSE, ISTAT mg/dl  --   --   --   --  162*  --  187* 159* 164* 179*  --  143*  --    CALCIUM mg/dL 9.5 9.1  --   --   --  8.7  --   --   --   --   --   --  9.9   EGFR ml/min/1.73sq m 90 86  --   --   --  84  --   --   --   --   --   --  83    < > = values in this interval not displayed.       BMP:  Results from last 7 days   Lab Units 09/13/24  0554 09/12/24  0417 09/11/24 2009 09/11/24  1604 09/11/24  1244 09/11/24  1240 09/11/24  1128 09/11/24  1109 09/11/24  1042 09/11/24  0843 09/09/24  0441   POTASSIUM mmol/L 4.3 4.2 4.7 3.5  --  4.2  --   --   --   --  4.0   CHLORIDE mmol/L 105 110*  --   --   --  110*  --   --   --   --  108   CO2 mmol/L 20* 19*  --   --   --  22  --   --   --   --  21   CO2, I-STAT mmol/L  --   --   --   --  22  --  24 25 26   < >  --    BUN mg/dL 16 17  --   --   --  16  --   --   --   --  22   CREATININE mg/dL 0.93 0.96  --   --   --  0.98  --   --   --   --  0.99  "  GLUCOSE, ISTAT mg/dl  --   --   --   --  162*  --  187* 159* 164*   < >  --    CALCIUM mg/dL 9.5 9.1  --   --   --  8.7  --   --   --   --  9.9    < > = values in this interval not displayed.       No results found for: \"NTBNP\"          Results from last 7 days   Lab Units 09/13/24  0554 09/12/24  0410   MAGNESIUM mg/dL 1.9 2.2       Lipid Profile:   No results found for: \"CHOL\"  Lab Results   Component Value Date    HDL 49 09/02/2024     Lab Results   Component Value Date    LDLCALC 76 09/02/2024     Lab Results   Component Value Date    TRIG 185 (H) 09/02/2024         Meds/Allergies   all current active meds have been reviewed, current meds:   Current Facility-Administered Medications:     acetaminophen (TYLENOL) rectal suppository 650 mg, Q4H PRN    acetaminophen (TYLENOL) tablet 975 mg, Q6H While awake    amiodarone tablet 200 mg, Q8H KALYANI    aspirin tablet 325 mg, Daily    atorvastatin (LIPITOR) tablet 80 mg, Daily With Dinner    bisacodyl (DULCOLAX) rectal suppository 10 mg, Daily PRN    chlorhexidine (PERIDEX) 0.12 % oral rinse 15 mL, BID    docusate sodium (COLACE) capsule 100 mg, BID    fentaNYL injection 50 mcg, Once    heparin (porcine) subcutaneous injection 5,000 Units, Q8H KALYANI **AND** [CANCELED] Platelet count, Once    insulin regular (HumuLIN R,NovoLIN R) 1 Units/mL in sodium chloride 0.9 % 100 mL infusion, Titrated, Last Rate: 1.5 Units/hr (09/13/24 0415)    metoprolol tartrate (LOPRESSOR) tablet 50 mg, Q12H KALYANI    mupirocin (BACTROBAN) 2 % nasal ointment 1 Application, Q12H KALYANI    niCARdipine (CARDENE) 25 mg (STANDARD CONCENTRATION) in sodium chloride 0.9% 250 mL, Titrated, Last Rate: Stopped (09/12/24 0900)    ondansetron (ZOFRAN) injection 4 mg, Q6H PRN    oxyCODONE (ROXICODONE) split tablet 2.5 mg, Q4H PRN **OR** oxyCODONE (ROXICODONE) IR tablet 5 mg, Q4H PRN    pantoprazole (PROTONIX) EC tablet 40 mg, Early Morning    polyethylene glycol (MIRALAX) packet 17 g, Daily    potassium chloride " (Klor-Con M20) CR tablet 20 mEq, BID    sodium chloride infusion 0.45 %, Continuous, Last Rate: 20 mL/hr (09/11/24 1300)    temazepam (RESTORIL) capsule 15 mg, HS PRN    torsemide (DEMADEX) tablet 20 mg, BID, and PTA meds:   Prior to Admission Medications   Prescriptions Last Dose Informant Patient Reported? Taking?   lisinopril (ZESTRIL) 10 mg tablet   Yes No   Sig: Take 10 mg by mouth daily   metFORMIN (GLUCOPHAGE) 1000 MG tablet   Yes No   Sig: Take 1,000 mg by mouth 2 (two) times a day with meals      Facility-Administered Medications: None       Medications Prior to Admission:     lisinopril (ZESTRIL) 10 mg tablet    metFORMIN (GLUCOPHAGE) 1000 MG tablet    insulin regular (HumuLIN R,NovoLIN R) 1 Units/mL in sodium chloride 0.9 % 100 mL infusion, 0.3-21 Units/hr, Last Rate: 1.5 Units/hr (09/13/24 0415)  niCARdipine, 2.5-15 mg/hr, Last Rate: Stopped (09/12/24 0900)  sodium chloride, 20 mL/hr, Last Rate: 20 mL/hr (09/11/24 1300)        Counseling / Coordination of Care  Total floor / unit time spent today 20 minutes.  Greater than 50% of total time was spent with the patient and / or family counseling and / or coordination of care.      ** Please Note: Dragon 360 Dictation voice to text software may have been used in the creation of this document. **

## 2024-09-13 NOTE — CASE MANAGEMENT
Case Management Discharge Planning Note    Patient name Aristoteles Toribio Reyes  Location [facility]-01 MRN [id number]  : 1965 Date 2024       Current Admission Date: 2024  Current Admission Diagnosis:CAD, multiple vessel   Patient Active Problem List    Diagnosis Date Noted Date Diagnosed    S/P CABG x 2 2024     CAD, multiple vessel 2024     Stroke-like symptoms old CVA on CT 2024     HTN (hypertension) 2024     NSTEMI (non-ST elevated myocardial infarction) ([facility]) 2024     CVA (cerebral vascular accident) ([facility]) 2024     Type 2 diabetes mellitus, without long-term current use of insulin ([facility]) 2024       LOS (days): 8  Geometric Mean LOS (GMLOS) (days):   Days to GMLOS:     OBJECTIVE:  Risk of Unplanned Readmission Score: 12.86         Current admission status: Inpatient   Preferred Pharmacy:   [facility]  [place] [address] [place] [address] [address] [facility] [address]  Phone: [phone] Fax: [phone]    Primary Care Provider: No primary care provider on file.    Primary Insurance: [employer]  Secondary Insurance:     DISCHARGE DETAILS:    Discharge planning discussed with:: Patient and family at bedside  Freedom of Choice: Yes  Comments - Freedom of Choice: CM informed patient of home nursing recommendation. Patient is agreeable to blanket referral being placed. CM used [employer] for interpertation.  CM contacted family/caregiver?: Yes

## 2024-09-13 NOTE — ASSESSMENT & PLAN NOTE
S/p LIMA-LAD and SVG-RPDA on 9/11  Final intraoperative MARY: EF 50-55% with improved RWMA  Rhythm management: Amiodarone 200 mg TID and Lopressor 50 mg BID  Volume management: IV Lasix transition to torsemide 20 mg BID today with K+ supplement.  Net output -4.8 L  Hemodynamics: Plan to discontinue EPW and chest tubes today  Remains on aspirin, beta-blocker, statin

## 2024-09-13 NOTE — PLAN OF CARE
Problem: PAIN - ADULT  Goal: Verbalizes/displays adequate comfort level or baseline comfort level  Description: Interventions:  - Encourage patient to monitor pain and request assistance  - Assess pain using appropriate pain scale  - Administer analgesics based on type and severity of pain and evaluate response  - Implement non-pharmacological measures as appropriate and evaluate response  - Consider cultural and social influences on pain and pain management  - Notify physician/advanced practitioner if interventions unsuccessful or patient reports new pain  Outcome: Progressing     Problem: INFECTION - ADULT  Goal: Absence or prevention of progression during hospitalization  Description: INTERVENTIONS:  - Assess and monitor for signs and symptoms of infection  - Monitor lab/diagnostic results  - Monitor all insertion sites, i.e. indwelling lines, tubes, and drains  - Monitor endotracheal if appropriate and nasal secretions for changes in amount and color  - [place] appropriate cooling/warming therapies per order  - Administer medications as ordered  - Instruct and encourage patient and family to use good hand hygiene technique  - Identify and instruct in appropriate isolation precautions for identified infection/condition  Outcome: Progressing

## 2024-09-13 NOTE — RESTORATIVE TECHNICIAN NOTE
Restorative Technician Note      Patient Name: Aristoteles Toribio Reyes     Restorative Tech Visit Date: 09/13/24  Note Type: Mobility  Patient Position Upon Consult: Bedside chair  Activity Performed: Ambulated  Assistive Device: Roller walker  Patient Position at End of Consult: All needs within reach; Supine; Other (comment) (for tubes and wires)

## 2024-09-13 NOTE — ASSESSMENT & PLAN NOTE
With NSTEMI --> MV disease on cardiac cath. s/p CABG as below  LVEF 50% on echo, no significant valvular disease.

## 2024-09-13 NOTE — PROGRESS NOTES
"Endocrinoloy Progress Note   Aristoteles Toribio Reyes 58 y.o. male MRN: [id number]  Unit/Bed#: [facility]-320-01 Encounter: [id number]      CC: diabetes f/u    Subjective:   Aristoteles Toribio Reyes is a 58 y.o. year old male with type 2 admitted with NSTEMI now s/p CABG X2 diabetes.  9/11    Feels well.  No complaints.  No hypoglycemia.  Eating.  Remains on insulin infusion.    Objective:     Vitals: Blood pressure 100/64, pulse 76, temperature 98.9 °F (37.2 °C), temperature source Oral, resp. rate 17, height 5' 2\" (1.575 m), weight 61.2 kg (134 lb 14.7 oz), SpO2 94%.,Body mass index is 24.68 kg/m².      Intake/Output Summary (Last 24 hours) at 9/13/2024 1823  Last data filed at 9/13/2024 1730  Gross per 24 hour   Intake 576.78 ml   Output 1605 ml   Net -1028.22 ml       Physical Exam:  General Appearance: awake, appears stated age and cooperative  Head: Normocephalic, without obvious abnormality, atraumatic  Extremities: moves all extremities  Skin: Skin color and temperature normal.   Pulm: no labored breathing    Lab, Imaging and other studies: No pertinent imaging studies reviewed.    POC Glucose (mg/dl)   Date Value   09/13/2024 165 (H)   09/13/2024 149 (H)   09/13/2024 118   09/13/2024 115   09/13/2024 252 (H)   09/13/2024 277 (H)   09/13/2024 120   09/13/2024 138   09/13/2024 95   09/12/2024 92       Assessment and plan:  -Diabetes Mellitus type II   A1c 8.4% 9/2/2024   Home regimen 8 units Lantus nightly, metformin 1000 mg twice daily  -CAD s/p CABG X29/11    --Will transition off of insulin infusion tonight  -- Start Lantus 20 units at bedtime  -- Stop insulin infusion 2 hours later   --start Humalog 5 units with meals  --Start Algorithm 3/2 correction scale tomorrow.  Discharge recommendations pending clinical course  --Hypoglycemia protocol  --Will continue to follow and make adjustments as appropriate    Emmy Franklin DO  Endocrinology PGY-5  ~A morning cortisol is only interpretable if drawn at " "8am.~    Please Epic[employer]ureChat questions to the physician covering the \"[facility] Endocrinology Call\" Role. Thank you.   "

## 2024-09-13 NOTE — PLAN OF CARE
Problem: PHYSICAL THERAPY ADULT  Goal: Performs mobility at highest level of function for planned discharge setting.  See evaluation for individualized goals.  Description: Treatment/Interventions: Functional transfer training, LE strengthening/ROM, Elevations, Therapeutic exercise, Endurance training, Bed mobility, Gait training, Spoke to nursing, Family, Spoke to case management  Equipment Recommended: Walker       See flowsheet documentation for full assessment, interventions and recommendations.  Outcome: Progressing  Note: Prognosis: Good  Problem List: Decreased strength, Decreased endurance, Impaired balance, Decreased mobility  Assessment: Pt demonstrated overall improvement in balance, endurance and all aspects of observed mobility progressing w/ amb distance while still using rw for support; no overt uncorrected LOB, gross knee buckling, or swaying observed. Pt appeared to be comfortable at the end of session; overall, cont to anticipate pt will return home w/ family support upon D/C pending additional progress (incl on the steps) and when medically cleared; will follow        Rehab Resource Intensity Level, PT: No post-acute rehabilitation needs    See flowsheet documentation for full assessment.

## 2024-09-13 NOTE — PHYSICAL THERAPY NOTE
PHYSICAL THERAPY NOTE          Patient Name: Aristoteles Toribio Reyes  Today's Date: 9/13/2024 09/13/24 1429   PT Last Visit   PT Visit Date 09/13/24   Note Type   Note Type Treatment   Pain Assessment   Pain Assessment Tool 0-10   Pain Score No Pain   Restrictions/Precautions   Other Precautions Cardiac/sternal;Multiple lines;Telemetry   General   Chart Reviewed Yes   Additional Pertinent History cleared for Tx session by nsg   Response to Previous Treatment Patient with no complaints from previous session.   Family/Caregiver Present Yes   Cognition   Overall Cognitive Status WFL   Arousal/Participation Alert;Cooperative   Attention Attends with cues to redirect   Orientation Level Oriented to person;Oriented to place;Oriented to situation   Memory Decreased recall of precautions   Following Commands Follows one step commands without difficulty   Subjective   Subjective Alert; sitting on edge of bed; agreeable to mobilize   Transfers   Sit to Stand 4  Minimal assistance   Additional items Assist x 1;Verbal cues   Stand to Sit 4  Minimal assistance   Additional items Assist x 1;Verbal cues   Ambulation/Elevation   Gait pattern Short stride;Inconsistent palak   Gait Assistance 4  Minimal assist   Additional items Assist x 1;Verbal cues;Tactile cues   Assistive Device Rolling walker   Distance 200 ft   Stair Management Assistance Not tested   Balance   Static Sitting Fair +   Dynamic Sitting Fair   Static Standing Fair -   Dynamic Standing Fair -   Ambulatory Poor +   Activity Tolerance   Activity Tolerance Patient tolerated treatment well   Nurse Made Aware spoke to MARYAM Castaneda (primary RN was not available)   Assessment   Prognosis Good   Problem List Decreased strength;Decreased endurance;Impaired balance;Decreased mobility   Assessment Pt demonstrated overall improvement in balance, endurance and all aspects of observed  mobility progressing w/ amb distance while still using rw for support; no overt uncorrected LOB, gross knee buckling, or swaying observed. Pt appeared to be comfortable at the end of session; overall, cont to anticipate pt will return home w/ family support upon D/C pending additional progress (incl on the steps) and when medically cleared; will follow   Goals   Patient Goals to walk   STG Expiration Date 09/26/24   PT Treatment Day 1   Plan   Treatment/Interventions Functional transfer training;LE strengthening/ROM;Elevations;Therapeutic exercise;Endurance training;Bed mobility;Gait training;Spoke to nursing;Family;Spoke to case management   Progress Progressing toward goals   PT Frequency 3-5x/wk   Discharge Recommendation   Rehab Resource Intensity Level, PT No post-acute rehabilitation needs   Equipment Recommended Walker   Walker Package Recommended Wheeled walker   Change/add to Walker Package? No   AM-PAC Basic Mobility Inpatient   Turning in Flat Bed Without Bedrails 3   Lying on Back to Sitting on Edge of Flat Bed Without Bedrails 3   Moving Bed to Chair 3   Standing Up From Chair Using Arms 3   Walk in Room 3   Climb 3-5 Stairs With Railing 3   Basic Mobility Inpatient Raw Score 18   Basic Mobility Standardized Score 41.05   [facility] Highest Level Of Mobility   -HLM Goal 6: Walk 10 steps or more   -HLM Achieved 7: Walk 25 feet or more   Education   Education Provided Mobility training;Assistive device   Patient Demonstrates verbal understanding   End of Consult   Patient Position at End of Consult Bedside chair;All needs within reach     Joesph James

## 2024-09-13 NOTE — PROCEDURES
09/13/24    Procedure: Chest tube removal    Chest tubes removed in routine fashion without incident.  Insertion site dressed with Acticoat.  Aristoteles Toribio Reyes tolerated the procedure well.  Nurse notified.    Procedure: Epicardial Pacing Wire removal    Aristoteles Toribio Reyes was returned to bed and informed of mandatory one hour post-procedure bed rest.  The assigned nurse was notified.  Epicardial pacing wires removed in routine fashion, without incident.  The patient tolerated the procedure well.  Vital signs ordered  q 15 minutes for one hour, as per protocol.    SIGNATURE: Drew Daugherty PA-C  DATE: September 13, 2024  TIME: 10:42 AM

## 2024-09-14 LAB
ANION GAP SERPL CALCULATED.3IONS-SCNC: 10 MMOL/L (ref 4–13)
BUN SERPL-MCNC: 22 MG/DL (ref 5–25)
CALCIUM SERPL-MCNC: 9.3 MG/DL (ref 8.4–10.2)
CHLORIDE SERPL-SCNC: 104 MMOL/L (ref 96–108)
CO2 SERPL-SCNC: 25 MMOL/L (ref 21–32)
CREAT SERPL-MCNC: 1.25 MG/DL (ref 0.6–1.3)
GFR SERPL CREATININE-BSD FRML MDRD: 63 ML/MIN/1.73SQ M
GLUCOSE SERPL-MCNC: 112 MG/DL (ref 65–140)
GLUCOSE SERPL-MCNC: 129 MG/DL (ref 65–140)
GLUCOSE SERPL-MCNC: 147 MG/DL (ref 65–140)
GLUCOSE SERPL-MCNC: 153 MG/DL (ref 65–140)
GLUCOSE SERPL-MCNC: 153 MG/DL (ref 65–140)
GLUCOSE SERPL-MCNC: 154 MG/DL (ref 65–140)
GLUCOSE SERPL-MCNC: 160 MG/DL (ref 65–140)
POTASSIUM SERPL-SCNC: 4.4 MMOL/L (ref 3.5–5.3)
SODIUM SERPL-SCNC: 139 MMOL/L (ref 135–147)

## 2024-09-14 PROCEDURE — 82948 REAGENT STRIP/BLOOD GLUCOSE: CPT

## 2024-09-14 PROCEDURE — 99231 SBSQ HOSP IP/OBS SF/LOW 25: CPT | Performed by: STUDENT IN AN ORGANIZED HEALTH CARE EDUCATION/TRAINING PROGRAM

## 2024-09-14 PROCEDURE — 80048 BASIC METABOLIC PNL TOTAL CA: CPT | Performed by: PHYSICIAN ASSISTANT

## 2024-09-14 PROCEDURE — 99024 POSTOP FOLLOW-UP VISIT: CPT | Performed by: PHYSICIAN ASSISTANT

## 2024-09-14 RX ADMIN — INSULIN LISPRO 5 UNITS: 100 INJECTION, SOLUTION INTRAVENOUS; SUBCUTANEOUS at 17:01

## 2024-09-14 RX ADMIN — TORSEMIDE 20 MG: 20 TABLET ORAL at 08:58

## 2024-09-14 RX ADMIN — TORSEMIDE 20 MG: 20 TABLET ORAL at 17:06

## 2024-09-14 RX ADMIN — DOCUSATE SODIUM 100 MG: 100 CAPSULE, LIQUID FILLED ORAL at 17:06

## 2024-09-14 RX ADMIN — ACETAMINOPHEN 975 MG: 325 TABLET ORAL at 08:58

## 2024-09-14 RX ADMIN — INSULIN LISPRO 1 UNITS: 100 INJECTION, SOLUTION INTRAVENOUS; SUBCUTANEOUS at 11:50

## 2024-09-14 RX ADMIN — POTASSIUM CHLORIDE 20 MEQ: 1500 TABLET, EXTENDED RELEASE ORAL at 17:06

## 2024-09-14 RX ADMIN — POTASSIUM CHLORIDE 20 MEQ: 1500 TABLET, EXTENDED RELEASE ORAL at 08:58

## 2024-09-14 RX ADMIN — PANTOPRAZOLE SODIUM 40 MG: 40 TABLET, DELAYED RELEASE ORAL at 06:44

## 2024-09-14 RX ADMIN — INSULIN LISPRO 5 UNITS: 100 INJECTION, SOLUTION INTRAVENOUS; SUBCUTANEOUS at 11:51

## 2024-09-14 RX ADMIN — ACETAMINOPHEN 975 MG: 325 TABLET ORAL at 21:16

## 2024-09-14 RX ADMIN — HEPARIN SODIUM 5000 UNITS: 5000 INJECTION INTRAVENOUS; SUBCUTANEOUS at 06:44

## 2024-09-14 RX ADMIN — ATORVASTATIN CALCIUM 80 MG: 80 TABLET, FILM COATED ORAL at 16:02

## 2024-09-14 RX ADMIN — HEPARIN SODIUM 5000 UNITS: 5000 INJECTION INTRAVENOUS; SUBCUTANEOUS at 14:20

## 2024-09-14 RX ADMIN — INSULIN LISPRO 5 UNITS: 100 INJECTION, SOLUTION INTRAVENOUS; SUBCUTANEOUS at 08:58

## 2024-09-14 RX ADMIN — METOPROLOL TARTRATE 50 MG: 50 TABLET, FILM COATED ORAL at 21:15

## 2024-09-14 RX ADMIN — OXYCODONE HYDROCHLORIDE 5 MG: 5 TABLET ORAL at 14:21

## 2024-09-14 RX ADMIN — METOPROLOL TARTRATE 50 MG: 50 TABLET, FILM COATED ORAL at 08:58

## 2024-09-14 RX ADMIN — ASPIRIN 325 MG ORAL TABLET 325 MG: 325 PILL ORAL at 08:58

## 2024-09-14 RX ADMIN — INSULIN LISPRO 1 UNITS: 100 INJECTION, SOLUTION INTRAVENOUS; SUBCUTANEOUS at 17:01

## 2024-09-14 RX ADMIN — ACETAMINOPHEN 975 MG: 325 TABLET ORAL at 14:20

## 2024-09-14 RX ADMIN — CHLORHEXIDINE GLUCONATE 0.12% ORAL RINSE 15 ML: 1.2 LIQUID ORAL at 17:06

## 2024-09-14 RX ADMIN — AMIODARONE HYDROCHLORIDE 200 MG: 200 TABLET ORAL at 14:20

## 2024-09-14 RX ADMIN — CHLORHEXIDINE GLUCONATE 0.12% ORAL RINSE 15 ML: 1.2 LIQUID ORAL at 08:59

## 2024-09-14 RX ADMIN — INSULIN GLARGINE 20 UNITS: 100 INJECTION, SOLUTION SUBCUTANEOUS at 21:16

## 2024-09-14 RX ADMIN — OXYCODONE HYDROCHLORIDE 5 MG: 5 TABLET ORAL at 02:58

## 2024-09-14 RX ADMIN — OXYCODONE HYDROCHLORIDE 5 MG: 5 TABLET ORAL at 08:58

## 2024-09-14 RX ADMIN — HEPARIN SODIUM 5000 UNITS: 5000 INJECTION INTRAVENOUS; SUBCUTANEOUS at 21:16

## 2024-09-14 RX ADMIN — OXYCODONE HYDROCHLORIDE 5 MG: 5 TABLET ORAL at 18:27

## 2024-09-14 RX ADMIN — AMIODARONE HYDROCHLORIDE 200 MG: 200 TABLET ORAL at 06:44

## 2024-09-14 RX ADMIN — AMIODARONE HYDROCHLORIDE 200 MG: 200 TABLET ORAL at 21:15

## 2024-09-14 RX ADMIN — DOCUSATE SODIUM 100 MG: 100 CAPSULE, LIQUID FILLED ORAL at 08:59

## 2024-09-14 NOTE — PROGRESS NOTES
Progress Note - Cardiac Surgery   Aristoteles Toribio Reyes 58 y.o. male MRN: [id number]  Unit/Bed#: PPHP-320-01 Encounter: [id number]    Coronary artery disease. S/P coronary artery bypass grafting; POD #       24 Hour Events: No telemetry events overnight.     Medications:   Scheduled Meds:  Current Facility-Administered Medications   Medication Dose Route Frequency Provider Last Rate    acetaminophen  650 mg Rectal Q4H PRN Emily Haynes PA-C      acetaminophen  975 mg Oral Q6H While awake Emily Haynes PA-C      amiodarone  200 mg Oral Q8H [facility] Emily Haynes PA-C      aspirin  325 mg Oral Daily Emily Haynes PA-C      atorvastatin  80 mg Oral Daily With Dinner Emily Haynes PA-C      bisacodyl  10 mg Rectal Daily PRN Emily Haynes PA-C      chlorhexidine  15 mL Mouth/Throat BID Emily Haynes PA-C      docusate sodium  100 mg Oral BID Emily Haynes PA-C      fentaNYL  50 mcg Intravenous Once Emily Haynes PA-C      heparin (porcine)  5,000 Units Subcutaneous Q8H [facility] Emily Haynes PA-C      insulin glargine  20 Units Subcutaneous HS Emmy Franklin, DO      insulin lispro  1-5 Units Subcutaneous HS Emmy Bielalorenzo, DO      insulin lispro  1-6 Units Subcutaneous TID AC Emmy Franklin, DO      insulin lispro  5 Units Subcutaneous TID With Meals Emmy Franklin, DO      metoprolol tartrate  50 mg Oral Q12H [facility] Drew Daugherty PA-C      niCARdipine  2.5-15 mg/hr Intravenous Titrated Emily Haynes PA-C Stopped (09/12/24 0900)    ondansetron  4 mg Intravenous Q6H PRN Emily Haynes PA-C      oxyCODONE  2.5 mg Oral Q4H PRN Emily Haynes PA-C      Or    oxyCODONE  5 mg Oral Q4H PRN Emily Haynes PA-C      pantoprazole  40 mg Oral Early Morning Emily Haynes PA-C      polyethylene glycol  17 g Oral Daily Emily Haynes PA-C      potassium chloride  20 mEq Oral BID Emily Haynes PA-C      sodium chloride  20 mL/hr  Intravenous Continuous Emily Haynes PA-C Stopped (09/14/24 0001)    temazepam  15 mg Oral HS PRN Emily Haynes PA-C      torsemide  20 mg Oral BID Drew Daugherty PA-C       Continuous Infusions:niCARdipine, 2.5-15 mg/hr, Last Rate: Stopped (09/12/24 0900)  sodium chloride, 20 mL/hr, Last Rate: Stopped (09/14/24 0001)      PRN Meds:.  acetaminophen    bisacodyl    ondansetron    oxyCODONE **OR** oxyCODONE    temazepam    Vitals:   Vitals:    09/13/24 2147 09/13/24 2149 09/13/24 2209 09/14/24 0231   BP: 104/72 104/72 119/75 113/69   BP Location:       Pulse: 85 85 84 79   Resp:    20   Temp:   98.3 °F (36.8 °C) 98.6 °F (37 °C)   TempSrc:       SpO2:  94% 94% 94%   Weight:       Height:           Telemetry: NSR; Heart Rate: 81    Respiratory:   SpO2: SpO2: 94 %, SpO2 Activity: SpO2 Activity: At Rest; Room Air    Intake/Output:     Intake/Output Summary (Last 24 hours) at 9/14/2024 0644  Last data filed at 9/14/2024 0333  Gross per 24 hour   Intake 1010.85 ml   Output 1425 ml   Net -414.15 ml        Weights:   Weight (last 2 days)       Date/Time Weight    09/13/24 0600 61.2 (134.92)    09/12/24 0552 62.5 (137.79)            Results:   Results from last 7 days   Lab Units 09/13/24  0554 09/12/24  0410 09/11/24 2009 09/11/24  1244 09/11/24  1240 09/11/24  0843 09/09/24  0441   WBC Thousand/uL 14.79* 13.77*  --   --   --   --  9.05   HEMOGLOBIN g/dL 10.2* 10.1* 9.9*  --  9.6*  --  11.6*   I STAT HEMOGLOBIN   --   --   --    < >  --    < >  --    HEMATOCRIT % 30.7* 30.0* 29.4*  --  28.2*  --  33.9*   HEMATOCRIT, ISTAT   --   --   --    < >  --    < >  --    PLATELETS Thousands/uL 240 205  --   --  183   < > 307    < > = values in this interval not displayed.     Results from last 7 days   Lab Units 09/14/24  0407 09/13/24  0554 09/12/24  0417 09/11/24  1604 09/11/24  1244   SODIUM mmol/L 139 137 138  --   --    POTASSIUM mmol/L 4.4 4.3 4.2   < >  --    CHLORIDE mmol/L 104 105 110*  --   --    CO2 mmol/L 25  20* 19*  --   --    CO2, I-STAT mmol/L  --   --   --   --  22   BUN mg/dL 22 16 17  --   --    CREATININE mg/dL 1.25 0.93 0.96  --   --    GLUCOSE, ISTAT mg/dl  --   --   --   --  162*   CALCIUM mg/dL 9.3 9.5 9.1  --   --     < > = values in this interval not displayed.     Recent Labs     09/13/24  0554   MG 1.9     Results from last 7 days   Lab Units 09/10/24  2334 09/10/24  1519 09/10/24  0502   PTT seconds 183* 27 94*         Point of care glucose: 153-154    Studies:  No new studies    Invasive Lines/Tubes:  Invasive Devices       Central Venous Catheter Line  Duration             CVC Central Lines 09/11/24 2 days              Peripheral Intravenous Line  Duration             Peripheral IV 09/09/24 Right;Ventral (anterior) Forearm 4 days                  Physical Exam:    General: No acute distress, Alert, and Normal appearance  HEENT/NECK:  Normocephalic. Atraumatic.  No jugular venous distention.    Cardiac: Regular rate and rhythm, Irregularly irregular rate and rhythm, and No murmurs/rubs/gallops  Pulmonary:  Breath sounds clear bilaterally and No rales/rhonchi/wheezes  Abdomen:  Non-tender, Non-distended, and Normal bowel sounds  Incisions: Sternum is stable.  Incision is clean, dry, and intact.  and Saphenectomy incison is clean, dry, and intact.   Extremities: Extremities warm/dry  Neuro: Alert and oriented X 3  Skin: Warm/Dry, without rashes or lesions.      Assessment:  Principal Problem:    CAD, multiple vessel  Active Problems:    HTN (hypertension)    NSTEMI (non-ST elevated myocardial infarction) ([facility])    CVA (cerebral vascular accident) ([facility])    Type 2 diabetes mellitus, without long-term current use of insulin ([facility])    Stroke-like symptoms old CVA on CT    S/P CABG x 2       Coronary artery disease. S/P coronary artery bypass grafting; POD # 3    Plan:    Cardiac:     S/P acute preoperative NSTEMI  Normal ventricular systolic function, EF 50%    NSR; BP well-controlled  Tachycardic    HR better  controlled with Lopressor, 50mg PO BID    Continue prophylactic Amiodarone, 200 mg PO TID    Continue ASA and Statin therapy    Epicardial pacing wires no longer required.  Remove today    Maintain central IV access today for lack of peripheral access    Continue Subcutaneous Heparin for DVT prophylaxis    Pulmonary:     Good Room air oxygen saturation; Continue incentive spirometry/Coughing/Deep breathing exercises    Chest tube drainage diminished; D/C today    Renal:     Normal preoperative renal function  Baseline creatinine 1.07  Creatinine 1.25, from 0.93 today, from 0.93    Intake/Output net: - 400  mL/24 hours    Diuretic Regimen:  Continue PO Torsemide, 20 mg BID  Continue Potassium Chloride 20 mEq PO BID    Neuro:    Neurologically intact; No active issues     Incisional pain well controlled   Continue tylenol, 975 mg PO q 8, standing dose   Continue oxycodone, 2.5 to 5 mg PO q 4 hours prn pain    GI:    Controlled carbohydrate diet level two/Cardiac diet, with 1800 mL fluid restriction    Tolerating diet without complaint  + Flatus    Continue stool softeners and prn suppository    Continue GI prophylaxis    Endo:     Pre-Op Hgb A1C: 8.4    Has been transitioned to SSI  Not on injectable insulin therapy prior to open heart surgery  Recommendation for discharge diabetes regimen pending endocrinology follow up  Bedside nursing Injectable insulin therapy education has been ordered  Endocrinology following daily    7    Hematology:     Post-operative blood count acceptable; Trend prn  Post-operative acute blood loss anemia; Hemoglobin 10.2; trend prn    8.   Disposition:        Routine postoperative recovery to this point; Anticipated discharge date: 10/15      VTE Pharmacologic Prophylaxis: VTE covered by:  heparin (porcine), Subcutaneous, 5,000 Units at 09/13/24 7072      VTE Mechanical Prophylaxis: sequential compression device    Collaborative rounds completed with supervising physician  Plan of care  discussed with bedside nurse    SIGNATURE: Drew Daugherty PA-C  DATE: September 14, 2024  TIME: 6:44 AM

## 2024-09-14 NOTE — PROGRESS NOTES
"Progress Note - Aristoteles Toribio Reyes 58 y.o. male MRN: [id number]    Unit/Bed#: PPHP-320-01 Encounter: [id number]      CC: diabetes f/u    Subjective:   Aristoteles Toribio Reyes is a 58 y.o. year old male with type 2 diabetes.  Feels well, however notes he has not had a bowel movement in 4 days.  Denies nausea, vomiting, symptoms of hypoglycemia.  States appetite is good, has been eating 3 meals per day.  Objective:     Vitals: Blood pressure 127/70, pulse 90, temperature 98.6 °F (37 °C), resp. rate 20, height 5' 2\" (1.575 m), weight 59.5 kg (131 lb 2.8 oz), SpO2 96%.,Body mass index is 23.99 kg/m².      Intake/Output Summary (Last 24 hours) at 9/14/2024 1019  Last data filed at 9/14/2024 0829  Gross per 24 hour   Intake 1010.85 ml   Output 1275 ml   Net -264.15 ml       Physical Exam:  General Appearance: awake, appears stated age and cooperative  Head: Normocephalic, without obvious abnormality, atraumatic  Extremities: moves all extremities  Skin: Skin color and temperature normal.   Pulm: no labored breathing    Lab, Imaging and other studies: No pertinent imaging studies reviewed.    POC Glucose (mg/dl)   Date Value   09/14/2024 129   09/14/2024 154 (H)   09/14/2024 112   09/13/2024 175 (H)   09/13/2024 126   09/13/2024 165 (H)   09/13/2024 149 (H)   09/13/2024 118   09/13/2024 115   09/13/2024 252 (H)       Assessment/Plan:  Aristoteles Reyes is a 58-year-old male with type 2 diabetes mellitus with hyperglycemia and CAD s/p CABGx2 on 9/11/24    Type 2 diabetes mellitus   - A1C on 9/2/24 - 8.4%.  - Home regimen: Lantus 8 units qhs. Metformin 1000mg BID.   - Transitioned off on insulin gtt on 9/13  - CR: Lantus 20 units daily at bedtime, Humalog 5 units 3 times daily before meals. CSI Algorhithm 3 before meals and Algorhithm 2 before bed.   - At this time sugars are well controlled on current regimen.  - If BG continues to be well-controlled, can potentially discharge on this regimen.  - Hypoglycemia " protocol   - Endocrinology will continue to follow and make adjustments as necessary.    2. CAD s/pCABG x2 on 9/11/24  - Management per primary team.       Portions of the record may have been created with voice recognition software.

## 2024-09-15 VITALS
TEMPERATURE: 98.1 F | HEART RATE: 81 BPM | DIASTOLIC BLOOD PRESSURE: 75 MMHG | RESPIRATION RATE: 18 BRPM | OXYGEN SATURATION: 95 % | HEIGHT: 62 IN | WEIGHT: 126.32 LBS | SYSTOLIC BLOOD PRESSURE: 119 MMHG | BODY MASS INDEX: 23.25 KG/M2

## 2024-09-15 LAB — GLUCOSE SERPL-MCNC: 145 MG/DL (ref 65–140)

## 2024-09-15 PROCEDURE — 99024 POSTOP FOLLOW-UP VISIT: CPT | Performed by: PHYSICIAN ASSISTANT

## 2024-09-15 PROCEDURE — 82948 REAGENT STRIP/BLOOD GLUCOSE: CPT

## 2024-09-15 RX ORDER — DOCUSATE SODIUM 100 MG/1
100 CAPSULE, LIQUID FILLED ORAL 2 TIMES DAILY
Qty: 60 CAPSULE | Refills: 0 | Status: SHIPPED | OUTPATIENT
Start: 2024-09-15 | End: 2024-10-15

## 2024-09-15 RX ORDER — INSULIN GLARGINE 100 [IU]/ML
20 INJECTION, SOLUTION SUBCUTANEOUS
Qty: 6 ML | Refills: 2 | Status: SHIPPED | OUTPATIENT
Start: 2024-09-15 | End: 2024-12-14

## 2024-09-15 RX ORDER — POTASSIUM CHLORIDE 1500 MG/1
20 TABLET, EXTENDED RELEASE ORAL DAILY
Qty: 7 TABLET | Refills: 0 | Status: SHIPPED | OUTPATIENT
Start: 2024-09-15 | End: 2024-09-26 | Stop reason: ALTCHOICE

## 2024-09-15 RX ORDER — TORSEMIDE 20 MG/1
20 TABLET ORAL DAILY
Qty: 7 TABLET | Refills: 0 | Status: SHIPPED | OUTPATIENT
Start: 2024-09-15 | End: 2024-09-26 | Stop reason: ALTCHOICE

## 2024-09-15 RX ORDER — POLYETHYLENE GLYCOL 3350 17 G/17G
17 POWDER, FOR SOLUTION ORAL DAILY
Qty: 510 G | Refills: 0 | Status: SHIPPED | OUTPATIENT
Start: 2024-09-15 | End: 2024-10-15

## 2024-09-15 RX ORDER — ASPIRIN 325 MG
325 TABLET ORAL DAILY
Qty: 30 TABLET | Refills: 2 | Status: SHIPPED | OUTPATIENT
Start: 2024-09-15

## 2024-09-15 RX ORDER — OXYCODONE HYDROCHLORIDE 5 MG/1
5 TABLET ORAL EVERY 6 HOURS PRN
Qty: 28 TABLET | Refills: 0 | Status: SHIPPED | OUTPATIENT
Start: 2024-09-15 | End: 2024-09-22

## 2024-09-15 RX ORDER — METOPROLOL TARTRATE 50 MG
50 TABLET ORAL EVERY 12 HOURS SCHEDULED
Qty: 60 TABLET | Refills: 2 | Status: SHIPPED | OUTPATIENT
Start: 2024-09-15 | End: 2024-09-26 | Stop reason: SDUPTHER

## 2024-09-15 RX ORDER — INSULIN LISPRO 100 [IU]/ML
5 INJECTION, SOLUTION INTRAVENOUS; SUBCUTANEOUS
Qty: 4.5 ML | Refills: 2 | Status: SHIPPED | OUTPATIENT
Start: 2024-09-15 | End: 2024-12-14

## 2024-09-15 RX ORDER — SENNOSIDES 8.6 MG
650 CAPSULE ORAL EVERY 8 HOURS PRN
Qty: 30 TABLET | Refills: 0 | Status: SHIPPED | OUTPATIENT
Start: 2024-09-15 | End: 2024-10-15

## 2024-09-15 RX ORDER — ATORVASTATIN CALCIUM 80 MG/1
80 TABLET, FILM COATED ORAL
Qty: 30 TABLET | Refills: 2 | Status: SHIPPED | OUTPATIENT
Start: 2024-09-15 | End: 2024-09-26 | Stop reason: SDUPTHER

## 2024-09-15 RX ADMIN — ASPIRIN 325 MG ORAL TABLET 325 MG: 325 PILL ORAL at 08:51

## 2024-09-15 RX ADMIN — HEPARIN SODIUM 5000 UNITS: 5000 INJECTION INTRAVENOUS; SUBCUTANEOUS at 06:43

## 2024-09-15 RX ADMIN — METOPROLOL TARTRATE 50 MG: 50 TABLET, FILM COATED ORAL at 08:51

## 2024-09-15 RX ADMIN — TORSEMIDE 20 MG: 20 TABLET ORAL at 08:51

## 2024-09-15 RX ADMIN — ACETAMINOPHEN 975 MG: 325 TABLET ORAL at 08:51

## 2024-09-15 RX ADMIN — POTASSIUM CHLORIDE 20 MEQ: 1500 TABLET, EXTENDED RELEASE ORAL at 08:51

## 2024-09-15 RX ADMIN — AMIODARONE HYDROCHLORIDE 200 MG: 200 TABLET ORAL at 06:43

## 2024-09-15 RX ADMIN — PANTOPRAZOLE SODIUM 40 MG: 40 TABLET, DELAYED RELEASE ORAL at 06:43

## 2024-09-15 RX ADMIN — INSULIN LISPRO 5 UNITS: 100 INJECTION, SOLUTION INTRAVENOUS; SUBCUTANEOUS at 08:51

## 2024-09-15 RX ADMIN — OXYCODONE HYDROCHLORIDE 5 MG: 5 TABLET ORAL at 03:37

## 2024-09-15 RX ADMIN — DOCUSATE SODIUM 100 MG: 100 CAPSULE, LIQUID FILLED ORAL at 08:51

## 2024-09-15 RX ADMIN — OXYCODONE HYDROCHLORIDE 5 MG: 5 TABLET ORAL at 11:21

## 2024-09-15 NOTE — DISCHARGE INSTRUCTIONS
Instructions Following Open Heart Surgery      Protect your sternum (breast bone). Wires are placed during surgery to hold the sternum together. Do not lift anything heavier than 10 pounds for the first four weeks after surgery. (For example, a gallon of milk weighs 8 pounds) When you are seen for a routine postop check, you will be cleared to lift up to 25 lbs. Following three months from the time of surgery, you may lift without any restrictions.     Hug a pillow to your chest or cross your arms over your chest when you laugh, sneeze, or cough. You may resume sexual activity when you feel ready. Do not put any excessive pressure on your chest.    Be careful when you get into or out of a chair or bed.  Hug a pillow or cross your arms when you stand or sit. Do not twist as you move. Use only your legs to sit and stand. You may need a raised toilet seat if you have trouble standing up without using your arms.     No sleeping on side for the first 4 weeks. Limit daytime naps, to prevent difficulty sleeping at night.    Women: Wear a clean surgical bra every day.     Do not play sports that use your shoulder.  Examples include tennis and golf.    Do not drive until cleared by surgeon. Typically cleared to drive after one month, determination will be made at routine postop appointment. When a passenger in the car, wear a seat belt.    Continue you breathing exercises throughout the day with incentive spirometry    Activity: Your goal is to go on frequent walks, slowly increasing your activity level. Walking will help prevent leg swelling and prevent blood clots. When you start outpatient cardiac rehab in one month, you will be given additional instructions and a formal exercise plan. It is OK to go up steps, with help.     You may resume sexual activity when you are comfortable. Do not put excessive pressure on your chest.     Weigh yourself daily in the morning and keep of log of your weight. If your weight  increases more than 2 lbs per day or more than 5 lbs in a week, call your surgeon's office.    Keep your legs elevated when sitting. Pressure stockings may be worn to prevent significant leg swelling.     You may get routine vaccines any time after open heart surgery    Do not smoke:  Nicotine can damage blood vessels and make it more difficult to heal. Do not use e-cigarettes or smokeless tobacco in place of cigarettes or to help you quit. They still contain nicotine. Ask your primary care provider for information if you currently smoke and need help quitting.     Incision/Wound Care:    Shower daily. Gently wash your incision with warm water and mild soap. Do not scrub the area. Gently pat the area dry with a clean towel. If you have a bandage, dry the area and put on a new, clean bandage. Change your bandage at least daily, or if it gets wet or dirty. Always wash your hands before you care for your wound. Do not apply ointments, creams, lotions, powders, etc. If you develop signs of an infection (fever > 101, redness, warm skin, or drainage) please call your surgeon's office.     Do not pick at or touch puncture sites or incisions. Allow and scabs to come off on their own.     It is normal to have some drainage from the chest tube sites. Apply clean/dry dressings, until this resolves.    You may have discomfort or numbness along the incision for 3-4 weeks. It is normal to occasionally experience brief sharp shooing pains, tightness, or pulling sensations.    Do not take a bath or swim until cleared by surgeon.    As your incision heals, sometimes small tender lumps or pimple-like bumps develop which is due to your body attempting to “dissolve” the suture (stiches).     Call your local emergency number (249 in the [place]) or have someone call if:   You have squeezing, pressure, or pain in your chest or back, lightheadedness or sudden cold sweat  Short of breath that does not go away with rest  You cough up blood.  You  have a fast heartbeat that flutters. Or palpitations  You faint.  Signs of a stroke:  Numbness or drooping on one side of your face. Weakness in an arm or leg. Confusion or difficulty speaking. Dizziness, a severe headache, or vision loss    Call your surgeons office if:   You have bleeding that does not stop even after you apply pressure for 5 minutes.  You have redness, tenderness, or signs of infection at incision (pain, swelling, odor, or green/yellow discharge from incision site)  You have a severe headache.  You have a fever higher than 101°F (38.4°C).  You urinate less, or not at all.  You have persistent nausea, vomiting, or diarrhea  You hear persistent or worsening crunching or grinding in your sternum.  You have questions or concerns about your condition or care.      Diet:    Eat heart-healthy foods, low in unhealthy fats and sodium (salt). A heart healthy diet helps improve your cholesterol levels and lowers your risk for heart disease and stroke. You will receive formal education on healthy eating and label reading during your cardiac rehab in one month.   Choose foods that contain healthy fats, such as soybean, canola, olive, corn, and sunflower oils.  Limit unhealthy fats. Examples include:  Cholesterol  is found in animal foods, such as eggs and lobster, and in dairy products made from whole milk.    Saturated fat  is found in meats, such as greer and hamburger. It is also found in chicken or turkey skin, whole milk, and butter.     Trans fat  is found in packaged foods, such as potato chips and cookies. It is also in some fried foods, and shortening. Do not eat foods that contain trans fats.  Get 20 to 30 grams of fiber each day.  Fruits, vegetables, whole-grain foods, and legumes (cooked beans) are good sources of fiber.  Low Sodium: Limit to 2,000 mg or less each day, unless otherwise directed. Choose low-sodium or no-salt-added foods. Add little or no salt to food you prepare. Use herbs and  spices in place of salt.  Foods high in sodium include frozen dinners, macaroni and cheese, instant potatoes, canned vegetables, cured or smoked meats, hot dogs, greer and sausage, ketchup, barbecue sauce, salad dressing, pickles, olives, soy sauce, and miso.     Fluid Restriction: Fluid restriction after hospital discharge is advised. Limit your fluid intake to no more than 8 cups of liquid (8oz = 1cup) or 2000 mL per day.    Limit/Do not drink alcohol. Alcohol can damage heart and raise your blood pressure. The general recommended limit is 1 drink a day for women 21 or older and for men 65 or older. Do not have more than 3 drinks within 24 hours or 7 within a week. A drink of alcohol is 12 oz of beer, 5 oz of wine, or 1½ oz of liquor.        Narcotic Safety     What do I need to know about narcotic safety?    A narcotic is a type of medicine used to treat pain. When you are discharged from the hospital, you will be prescribed a narcotic called oxycodone. Pain control and management may help you rest, heal, and return to your daily activities. Do not take more than the recommended amount. Too much can cause a life-threatening overdose. Do not continue to take it after your pain stops.     How do I use narcotics safely?   Take prescribed narcotics exactly as directed.  You may develop tolerance. This means you keep needing higher doses to get the same effect. You may also develop narcotic use disorder. This means you are not able to control your narcotic use.  Do not give narcotics to others or take narcotics that belong to someone else.  Do not mix narcotics with other medicines or alcohol.  The combination can cause an overdose, or cause you to stop breathing.   Do not drive or operate heavy machinery after you use a narcotic.    Talk to your healthcare provider if you have any side effects.  Side effects include nausea, sleepiness, itching, and trouble thinking clearly.     What can I do to manage constipation?   Constipation is the most common side effect of narcotic medicine. Constipation is when you have hard, dry bowel movements, or you go longer than usual between bowel movements. The following are ways you can prevent or relieve constipation:  Drink liquids as allowed.  Drinking extra liquids will help soften and move your bowels. You should drink up to your maximum fluid allotment of 2 liters.    Eat high-fiber foods.  This may help decrease constipation by adding bulk to your bowel movements. High-fiber foods include fruits, vegetables, whole-grain breads and cereals, and beans  Supplements. You have been prescribed a fiber supplement called polyethylene glycol (Doretha lax) and a stool softener called docusate sodium (Colace). You should take these for as long as you continue narcotic medications.  Exercise regularly.  Regular physical activity can help stimulate your intestines. Walking is a good exercise to prevent or relieve constipation. Ask which exercises are best for you.    How do I store narcotics safely?   Store narcotics where others cannot easily get them.  Keep them in a locked cabinet or secure area. Do not  keep them in a purse or other bag you carry with you. A person may be looking for something else and find the narcotics.    Make sure narcotics are stored out of the reach of children.  A child can easily overdose on narcotics. Narcotics may look like candy to a small child.              Diabetes Management     What you need to know:   The stress of open heart surgery can increase your blood sugars for several months, which may put you at risk for poor wound healing and infection. Your healthcare provider has prescribed you insulin, to better control your blood sugars as you recover. If you did not take insulin before your heart operation, you may only need to continue this for three months following surgery.    Manage your blood sugar levels:   Check your blood sugar levels as directed. While you are  taking insulin, you should monitor blood sugars every day. You will check blood sugars, by testing a drop of blood in a glucose monitor. The goal for blood sugar levels before meals  is between 80 and 130 mg/dL and 2 hours after eating  is lower than 180 mg/dL. You should record all of your blood sugars, and bring a log to your family doctor or endocrinologist.    Know what to do if your blood sugar level is too high or too low.  Levels that remain too high or too low can be life-threatening. Learn the signs or symptoms of high blood sugar levels, such as increased urination and fatigue. Also learn the signs or symptoms of low levels, such as shakiness, sweating, or irritability. Always have glucose tablets, hard candy, or fruit juice available to take, if levels are too low.  Take your insulin as directed.  Your insulin will be given by injections with an insulin pen.  An insulin pen is a device prefilled with the right amount of insulin.         Insulin Injection instructions:    Your doctor will prescribe your exact dose and tell you how often it should be given. This medicine is given as a shot under your skin. You will be taught how to give yourself an injection before leaving the hospital. Make sure you understand all instructions before giving yourself an injection.  Always check the label before use, to make sure you have the correct type of insulin.  You will be shown the body areas where this shot can be given. Use a different body area each time you give yourself a shot. Keep track of where you give each shot to make sure you rotate body areas. Do not use the exact same spot for each injection.  Use a new needle each time you inject your medicine  Throw away used needles in a hard, closed container that the needles cannot poke through. Keep this container away from children and pets.  Missed dose: Take a dose as soon as you remember. If it is almost time for your next dose, wait until then and take a  regular dose. Do not take extra medicine to make up for a missed dose.  Insulin Storage Instructions:     New, unopened medicine: Store in the refrigerator in the original carton until the expiration date. Do not freeze. Do not use the insulin if it has been frozen. You may also store the unopened cartridge or FlexPen® at room temperature for up to 14 days.  Opened medicine: Once it has been opened you may store the cartridge or FlexPen® at room temperature for up to 14 days. Do not freeze.    Types of Insulin:     Insulin Glargine (Long acting insulin)   Brand Name(s): Basaglar KwikPen, Basaglar Tempo Pen, Lantus, Lantus Novaplus, Lantus SoloStar, Lantus SoloStar Pen, Lantus SoloStar Pen Novaplus, Semglee, Semglee Pen, Toujeo 1.5mL Prefilled Pen, Toujeo Max 3mL Prefilled Pen There may be other brand names for this medicine.    How to Use This Medicine:   This insulin should be given once a day, usually at bedtime. It is best to use it at about the same time every day.    Insulin Lispro (Short acting insulin)   Brand Name(s): Admelog, Admelog SoloStar Pen, HumaLOG, HumaLOG Helio KwikPen, HumaLOG KwikPen, HumaLOG Tempo Pen, Insulin Lispro KwikPen, Lyumjev KwikPen U-100, Lyumjev Kwikpen U-200, Lyumjev Tempo Pen, Lyumjev U-100. There may be other brand names for this medicine.    How to Use This Medicine:   This medicine should be give three times daily, with each meal.   Use this medicine 15 minutes before a meal or right after you eat.      Possible Side Effects While Using This Medicine:   Call your doctor right away if you notice any of these side effects:    Allergic reaction: Itching or hives, swelling in your face or hands, swelling or tingling in your mouth or throat, chest tightness, trouble breathing  Dry mouth, increased thirst, muscle cramps, nausea, vomiting, uneven heartbeat  Fever, chills, cough, stuffy or runny nose, sore throat, body aches  Rapid weight gain, swelling in your hands, ankles, or feet,  trouble breathing, tiredness  Shaking, trembling, sweating, fast or pounding heartbeat, lightheadedness, hunger, confusion  Call your doctor for medical advice about side effects. You may report side effects to FDA at 1-438-KIL-3270

## 2024-09-15 NOTE — UTILIZATION REVIEW
Continued Stay Review    Date: 9/14                           Current Patient Class: Inpatient  Current Level of Care: Med/Surg    HPI:58 y.o. male initially admitted on 9/5 for CABG.    Assessment/Plan: Insulin gtt d/c and pt started on lispro 5 units TID AC and SSI w/ BG checks ACHS. Telemetry NSR, rate 81. Sternum stable, incisions CDI. Plan: continue lopressor 50 mg BID, amiodarone 200 mg TID, ASA/statin, remove epicardial pacing wires today, maintain central line, SQ heparin, chest tube removed today, pulmonary toileting, continue torsemide. Analgesics PRN, bowel regimen. SSI w/ BG checks ACHS. Education for insulin therapy for pt as weill require insulin outpatient, Trend labs, replete electrolytes as needed.    Vital Signs (last 3 days)       Date/Time Temp Pulse Resp BP MAP (mmHg) Arterial Line BP MAP SpO2 O2 Device GCS Pain    09/15/24 0900 -- -- -- -- -- -- -- 95 % None (Room air) 15 --    09/15/24 07:32:25 98.1 °F (36.7 °C) 81 -- 119/75 90 -- -- 98 % -- -- --    09/15/24 0337 -- -- -- -- -- -- -- -- -- -- 7    09/15/24 02:41:55 98.3 °F (36.8 °C) 72 18 104/71 82 -- -- 97 % -- -- --    09/14/24 22:56:18 97.9 °F (36.6 °C) 71 16 100/67 78 -- -- 97 % -- -- --    09/14/24 2116 -- -- -- -- -- -- -- -- -- -- 5    09/14/24 2115 -- 96 -- 117/76 -- -- -- -- -- -- --    09/14/24 2000 -- -- -- -- -- -- -- 97 % None (Room air) 15 --    09/14/24 19:03:11 98.1 °F (36.7 °C) 85 18 125/73 90 -- -- 97 % -- -- --    09/14/24 1827 -- -- -- -- -- -- -- -- -- -- 7    09/14/24 15:35:02 97.7 °F (36.5 °C) 76 17 99/67 78 -- -- 98 % -- -- --    09/14/24 1421 -- -- -- -- -- -- -- -- -- -- 10 - Worst Possible Pain    09/14/24 10:48:56 98.4 °F (36.9 °C) 73 -- 104/74 84 -- -- 98 % -- -- --    09/14/24 0900 -- -- -- -- -- -- -- 96 % None (Room air) -- --    09/14/24 0858 -- -- -- -- -- -- -- -- -- -- 9    09/14/24 07:53:04 98.6 °F (37 °C) 90 -- 127/70 89 -- -- 95 % -- -- --    09/14/24 0258 -- -- -- -- -- -- -- -- -- -- 10 - Worst  Possible Pain    09/14/24 02:31:32 98.6 °F (37 °C) 79 20 113/69 84 -- -- 94 % -- -- --    09/13/24 22:09:26 98.3 °F (36.8 °C) 84 -- 119/75 90 -- -- 94 % -- -- --    09/13/24 21:49:47 -- 85 -- 104/72 83 -- -- 94 % -- -- --    09/13/24 2147 -- 85 -- 104/72 -- -- -- -- -- -- --    09/13/24 2100 -- -- -- -- -- -- -- 94 % None (Room air) 15 --    09/13/24 19:38:09 99.2 °F (37.3 °C) 98 -- 117/71 86 -- -- 93 % -- -- --    09/13/24 1938 -- -- -- -- -- -- -- -- -- -- 9    09/13/24 15:16:39 98.9 °F (37.2 °C) 76 17 100/64 76 -- -- 94 % None (Room air) -- --    09/13/24 1429 -- -- -- -- -- -- -- -- -- -- No Pain    09/13/24 0900 -- -- -- -- -- -- -- 93 % None (Room air) -- --    09/13/24 07:04:32 98.2 °F (36.8 °C) 92 20 123/73 90 -- -- 94 % -- -- --    09/13/24 0558 -- -- -- -- -- -- -- -- -- -- 8    09/13/24 02:45:09 98.6 °F (37 °C) 98 18 122/78 93 -- -- 91 % -- -- --    09/12/24 23:20:48 98.3 °F (36.8 °C) 95 20 111/72 85 -- -- 92 % -- -- --       Weight (last 2 days)       Date/Time Weight    09/15/24 0600 57.3 (126.32)    09/14/24 0725 59.5 (131.17)    09/14/24 0600 59.5 (131.17)    09/13/24 0600 61.2 (134.92)              Pertinent Labs/Diagnostic Results:     Results from last 7 days   Lab Units 09/13/24  0554 09/12/24  0410 09/11/24 2009 09/11/24  1244 09/11/24  1240 09/11/24  0843 09/09/24  0441   WBC Thousand/uL 14.79* 13.77*  --   --   --   --  9.05   HEMOGLOBIN g/dL 10.2* 10.1* 9.9*  --  9.6*  --  11.6*   I STAT HEMOGLOBIN g/dl  --   --   --  9.2*  --    < >  --    HEMATOCRIT % 30.7* 30.0* 29.4*  --  28.2*  --  33.9*   HEMATOCRIT, ISTAT %  --   --   --  27*  --    < >  --    PLATELETS Thousands/uL 240 205  --   --  183   < > 307   TOTAL NEUT ABS Thousands/µL  --   --   --   --   --   --  4.99    < > = values in this interval not displayed.         Results from last 7 days   Lab Units 09/14/24  0407 09/13/24  0554 09/12/24 0417 09/12/24 0410 09/11/24 2009 09/11/24  1604 09/11/24  1244 09/11/24  1240 09/11/24  1128  09/11/24  1109 09/11/24  1042 09/11/24  1009 09/11/24  0843 09/09/24  0441   SODIUM mmol/L 139 137 138  --   --   --   --  140  --   --   --   --   --  140   POTASSIUM mmol/L 4.4 4.3 4.2  --  4.7 3.5  --  4.2  --   --   --   --   --  4.0   CHLORIDE mmol/L 104 105 110*  --   --   --   --  110*  --   --   --   --   --  108   CO2 mmol/L 25 20* 19*  --   --   --   --  22  --   --   --   --   --  21   CO2, I-STAT mmol/L  --   --   --   --   --   --  22  --  24 25 26 26   < >  --    ANION GAP mmol/L 10 12 9  --   --   --   --  8  --   --   --   --   --  11   BUN mg/dL 22 16 17  --   --   --   --  16  --   --   --   --   --  22   CREATININE mg/dL 1.25 0.93 0.96  --   --   --   --  0.98  --   --   --   --   --  0.99   EGFR ml/min/1.73sq m 63 90 86  --   --   --   --  84  --   --   --   --   --  83   CALCIUM mg/dL 9.3 9.5 9.1  --   --   --   --  8.7  --   --   --   --   --  9.9   CALCIUM, IONIZED, ISTAT mmol/L  --   --   --   --   --   --  1.32  --  1.09* 1.10* 1.01* 1.30   < >  --    MAGNESIUM mg/dL  --  1.9  --  2.2  --   --   --   --   --   --   --   --   --   --     < > = values in this interval not displayed.         Results from last 7 days   Lab Units 09/15/24  0615 09/14/24  2108 09/14/24  1559 09/14/24  1046 09/14/24  0651 09/14/24  0229 09/14/24  0009 09/13/24 2208 09/13/24  2008 09/13/24  1807 09/13/24  1554 09/13/24  1407   POC GLUCOSE mg/dl 145* 147* 153* 160* 129 154* 112 175* 126 165* 149* 118     Results from last 7 days   Lab Units 09/14/24  0407 09/13/24  0554 09/12/24  0417 09/11/24  1240 09/09/24  0441   GLUCOSE RANDOM mg/dL 153* 145* 201* 164* 161*       Results from last 7 days   Lab Units 09/11/24  1244 09/11/24  1128 09/11/24  1109 09/11/24  1042 09/11/24  1009 09/11/24  0843   PH, STEFFANY I-STAT   --   --  7.353  --   --  7.320   PCO2, STEFFANY ISTAT mm HG  --   --  43.2  --   --  49.0   PO2, STEFFANY ISTAT mm HG  --   --  35.0  --   --  42.0   HCO3, STEFFANY ISTAT mmol/L  --   --  24.0  --   --  25.2   I STAT BASE  EXC mmol/L -5* -2 -1 0   < > -1   I STAT O2 SAT % 97* 100* 65 100*   < > 73   ISTAT PH ART  7.334* 7.366  --  7.380   < >  --    I STAT ART PCO2 mm HG 39.2 39.9  --  42.4   < >  --    I STAT ART PO2 mm .0 249.0*  --  261.0*   < >  --    I STAT ART HCO3 mmol/L 20.8* 22.9  --  25.1   < >  --     < > = values in this interval not displayed.     Results from last 7 days   Lab Units 09/10/24  2334 09/10/24  1519 09/10/24  0502   PTT seconds 183* 27 94*         Medications:   Scheduled Medications:  acetaminophen, 975 mg, Oral, Q6H While awake  amiodarone, 200 mg, Oral, Q8H KALYANI  aspirin, 325 mg, Oral, Daily  atorvastatin, 80 mg, Oral, Daily With Dinner  chlorhexidine, 15 mL, Mouth/Throat, BID  docusate sodium, 100 mg, Oral, BID  heparin (porcine), 5,000 Units, Subcutaneous, Q8H KALYANI  insulin glargine, 20 Units, Subcutaneous, HS  insulin lispro, 1-5 Units, Subcutaneous, HS  insulin lispro, 1-6 Units, Subcutaneous, TID AC  insulin lispro, 5 Units, Subcutaneous, TID With Meals  metoprolol tartrate, 50 mg, Oral, Q12H KALYANI  pantoprazole, 40 mg, Oral, Early Morning  polyethylene glycol, 17 g, Oral, Daily  potassium chloride, 20 mEq, Oral, BID  torsemide, 20 mg, Oral, BID    Continuous IV Infusions: none    PRN Meds:  acetaminophen, 650 mg, Rectal, Q4H PRN  bisacodyl, 10 mg, Rectal, Daily PRN  ondansetron, 4 mg, Intravenous, Q6H PRN  oxyCODONE, 2.5 mg, Oral, Q4H PRN  oxyCODONE, 5 mg, Oral, Q4H PRN; 9/14 x4  temazepam, 15 mg, Oral, HS PRN        Discharge Plan: D    Network Utilization Review Department  ATTENTION: Please call with any questions or concerns to [phone] and carefully listen to the prompts so that you are directed to the right person. All voicemails are confidential.   For Discharge needs, contact Care Management DC Support Team at [phone] opt. 2  Send all requests for admission clinical reviews, approved or denied determinations and any other requests to dedicated fax number below belonging to the  [place] where the patient is receiving treatment. List of dedicated fax numbers for the Facilities:  FACILITY NAME UR FAX NUMBER   ADMISSION DENIALS (Administrative/Medical Necessity) [phone]   DISCHARGE SUPPORT TEAM (NETWORK) [phone]   PARENT CHILD HEALTH (Maternity/NICU/Pediatrics) [phone]   [facility] [phone]   General acute hospital [phone]   [facility] [phone]   [facility] [phone]   [facility] [phone]   [facility] [phone]   [facility] [phone]   [facility] [phone]   [facility] [phone]   [facility] [phone]   [facility] [phone]   [facility] [phone]

## 2024-09-15 NOTE — PROGRESS NOTES
Progress Note - Cardiac Surgery   Aristoteles Toribio Reyes 58 y.o. male MRN: [id number]  Unit/Bed#: PPHP-320-01 Encounter: [id number]    Coronary artery disease. S/P coronary artery bypass grafting; POD # 4      24 Hour Events: No telemetry events overnight.     Medications:   Scheduled Meds:  Current Facility-Administered Medications   Medication Dose Route Frequency Provider Last Rate    acetaminophen  650 mg Rectal Q4H PRN Emily Haynes PA-C      acetaminophen  975 mg Oral Q6H While awake Emily Haynes PA-C      amiodarone  200 mg Oral Q8H [facility] Emily Haynes PA-C      aspirin  325 mg Oral Daily Emily Haynes PA-C      atorvastatin  80 mg Oral Daily With Dinner Emily Haynes PA-C      bisacodyl  10 mg Rectal Daily PRN Emily Haynes PA-C      chlorhexidine  15 mL Mouth/Throat BID Emily Haynes PA-C      docusate sodium  100 mg Oral BID Emily Haynes PA-C      heparin (porcine)  5,000 Units Subcutaneous Q8H [facility] Emily Haynes PA-C      insulin glargine  20 Units Subcutaneous HS Emmy Bielawski, DO      insulin lispro  1-5 Units Subcutaneous HS Emmy Bielawski, DO      insulin lispro  1-6 Units Subcutaneous TID AC Emmy Bielawski, DO      insulin lispro  5 Units Subcutaneous TID With Meals Emmy Bielawski, DO      metoprolol tartrate  50 mg Oral Q12H [facility] Drew Daugherty PA-C      niCARdipine  2.5-15 mg/hr Intravenous Titrated Emily Haynes PA-C Stopped (09/12/24 0900)    ondansetron  4 mg Intravenous Q6H PRN Emily Haynes PA-C      oxyCODONE  2.5 mg Oral Q4H PRN Emily Haynes PA-C      Or    oxyCODONE  5 mg Oral Q4H PRN Emily Haynes PA-C      pantoprazole  40 mg Oral Early Morning Emily Haynes PA-C      polyethylene glycol  17 g Oral Daily Emily Haynes PA-C      potassium chloride  20 mEq Oral BID Emily Haynes PA-C      sodium chloride  20 mL/hr Intravenous Continuous Emily Haynes PA-C Stopped (09/14/24  0001)    temazepam  15 mg Oral HS PRN Emily Haynes PA-C      torsemide  20 mg Oral BID Drew Daugherty PA-C       Continuous Infusions:niCARdipine, 2.5-15 mg/hr, Last Rate: Stopped (09/12/24 0900)  sodium chloride, 20 mL/hr, Last Rate: Stopped (09/14/24 0001)      PRN Meds:.  acetaminophen    bisacodyl    ondansetron    oxyCODONE **OR** oxyCODONE    temazepam    Vitals:   Vitals:    09/14/24 2256 09/15/24 0241 09/15/24 0600 09/15/24 0732   BP: 100/67 104/71  119/75   BP Location:  Right arm     Pulse: 71 72  81   Resp: 16 18     Temp: 97.9 °F (36.6 °C) 98.3 °F (36.8 °C)  98.1 °F (36.7 °C)   TempSrc:  Oral     SpO2: 97% 97%  98%   Weight:   57.3 kg (126 lb 5.2 oz)    Height:           Telemetry: NSR; Heart Rate: 77    Respiratory:   SpO2: SpO2: 98 %, SpO2 Activity: SpO2 Activity: At Rest; Room Air    Intake/Output:     Intake/Output Summary (Last 24 hours) at 9/15/2024 0805  Last data filed at 9/15/2024 0622  Gross per 24 hour   Intake 658 ml   Output 1150 ml   Net -492 ml        Weights:   Weight (last 2 days)       Date/Time Weight    09/15/24 0600 57.3 (126.32)    09/14/24 0725 59.5 (131.17)    09/14/24 0600 59.5 (131.17)    09/13/24 0600 61.2 (134.92)            Results:   Results from last 7 days   Lab Units 09/13/24  0554 09/12/24  0410 09/11/24 2009 09/11/24  1244 09/11/24  1240 09/11/24  0843 09/09/24  0441   WBC Thousand/uL 14.79* 13.77*  --   --   --   --  9.05   HEMOGLOBIN g/dL 10.2* 10.1* 9.9*  --  9.6*  --  11.6*   I STAT HEMOGLOBIN   --   --   --    < >  --    < >  --    HEMATOCRIT % 30.7* 30.0* 29.4*  --  28.2*  --  33.9*   HEMATOCRIT, ISTAT   --   --   --    < >  --    < >  --    PLATELETS Thousands/uL 240 205  --   --  183   < > 307    < > = values in this interval not displayed.     Results from last 7 days   Lab Units 09/14/24  0407 09/13/24  0554 09/12/24  0417 09/11/24  1604 09/11/24  1244   SODIUM mmol/L 139 137 138  --   --    POTASSIUM mmol/L 4.4 4.3 4.2   < >  --    CHLORIDE mmol/L  104 105 110*  --   --    CO2 mmol/L 25 20* 19*  --   --    CO2, I-STAT mmol/L  --   --   --   --  22   BUN mg/dL 22 16 17  --   --    CREATININE mg/dL 1.25 0.93 0.96  --   --    GLUCOSE, ISTAT mg/dl  --   --   --   --  162*   CALCIUM mg/dL 9.3 9.5 9.1  --   --     < > = values in this interval not displayed.     Recent Labs     09/13/24  0554   MG 1.9     Results from last 7 days   Lab Units 09/10/24  2334 09/10/24  1519 09/10/24  0502   PTT seconds 183* 27 94*         Point of care glucose: 147    Studies:  No new studies    Invasive Lines/Tubes:  Invasive Devices       Central Venous Catheter Line  Duration             CVC Central Lines 09/11/24 3 days              Peripheral Intravenous Line  Duration             Peripheral IV 09/09/24 Right;Ventral (anterior) Forearm 5 days                  Physical Exam:    General: No acute distress, Alert, and Normal appearance  HEENT/NECK:  Normocephalic. Atraumatic.  No jugular venous distention.    Cardiac: Regular rate and rhythm and No murmurs/rubs/gallops  Pulmonary:  Breath sounds clear bilaterally and No rales/rhonchi/wheezes  Abdomen:  Non-tender, Non-distended, and Normal bowel sounds  Incisions: Sternum is stable.  Incision is clean, dry, and intact.  and Saphenectomy incison is clean, dry, and intact.   Extremities: Extremities warm/dry and Trace edema B/L  Neuro: Alert and oriented X 3  Skin: Warm/Dry, without rashes or lesions.        Assessment:  Principal Problem:    CAD, multiple vessel  Active Problems:    HTN (hypertension)    NSTEMI (non-ST elevated myocardial infarction) ([facility])    CVA (cerebral vascular accident) ([facility])    Type 2 diabetes mellitus, without long-term current use of insulin ([facility])    Stroke-like symptoms old CVA on CT    S/P CABG x 2       Coronary artery disease. S/P coronary artery bypass grafting; POD # 4    Plan:    Cardiac:     S/P acute preoperative NSTEMI  Normal ventricular systolic function, EF 50%    NSR; BP  well-controlled  Tachycardic    HR better controlled with Lopressor, 50mg PO BID    Continue prophylactic Amiodarone, 200 mg PO TID    Continue ASA and Statin therapy    Maintain central IV access today for lack of peripheral access    Continue Subcutaneous Heparin for DVT prophylaxis    Pulmonary:     Good Room air oxygen saturation; Continue incentive spirometry/Coughing/Deep breathing exercises      Renal:     Normal preoperative renal function  Baseline creatinine 1.07  Creatinine 1.25, from 0.93 today, from 0.93    Intake/Output net: - 500  mL/24 hours    Diuretic Regimen:  Continue PO Torsemide, 20 mg BID  Continue Potassium Chloride 20 mEq PO BID    Neuro:    Neurologically intact; No active issues     Incisional pain well controlled   Continue tylenol, 975 mg PO q 8, standing dose   Continue oxycodone, 2.5 to 5 mg PO q 4 hours prn pain    GI:    Controlled carbohydrate diet level two/Cardiac diet, with 1800 mL fluid restriction    Tolerating diet without complaint  + Flatus    Continue stool softeners and prn suppository    Continue GI prophylaxis    Endo:     Pre-Op Hgb A1C: 8.4    Has been transitioned to SSI  Not on injectable insulin therapy prior to open heart surgery  Recommendation for discharge diabetes regimen pending endocrinology follow up  Bedside nursing Injectable insulin therapy education has been ordered  Endocrinology following daily    7    Hematology:     Post-operative blood count acceptable; Trend prn  Post-operative acute blood loss anemia; Hemoglobin 10.2; trend prn    8.   Disposition:        Home today      VTE Pharmacologic Prophylaxis: VTE covered by:  heparin (porcine), Subcutaneous, 5,000 Units at 09/15/24 0643      VTE Mechanical Prophylaxis: sequential compression device    Collaborative rounds completed with supervising physician  Plan of care discussed with bedside nurse    SIGNATURE: Drew Daugherty PA-C  DATE: September 15, 2024  TIME: 8:05 AM

## 2024-09-15 NOTE — DISCHARGE SUMMARY
Discharge Summary - Cardiac Surgery   Aristoteles Toribio Reyes 58 y.o. male MRN: [id number]  Unit/Bed#: PPHP-320-01 Encounter: [id number]    Admission Date: 9/5/2024     Discharge Date: 09/15/24    Admitting Diagnosis: Triple vessel disease of the heart [I25.10]    Primary Discharge Diagnosis:   Coronary artery disease. S/P coronary artery bypass grafting;    Secondary Discharge Diagnosis:   history of stroke, DM2 (a1c 8.4), hypertension, hyperlipidemia     Attending: Shaun Hernandez D.O.    Consulting Physician(s):   Cardiology  Endocrinology  Medical critical care    Procedures Performed:   Procedure(s):  CORONARY ARTERY BYPASS GRAFT (CABG) 2 VESSELS, LIMA-LAD,SVG/EVH-PDA, MARY     Hospital Course:   The patient was seen in consultation prior to this admission for evaluation of Coronary artery disease.  Risks and benefits of coronary artery bypass grafting were discussed in detail, and patient was agreeable.  Routine preoperative evaluation was completed and informed consent was obtained prior to admission.    9/4: Presented to [facility] ER 9/2 for eval of substernal chest pain with radiation to left arm with multiple episodes.+ headache and nausea. EKG with no ST elevations and no serial changes.  Of note the patient had significant elevated blood pressure and reported not taking his lisinopril.  Also had possible left leg weakness on ER eval.  CTA chest/abd/pelvis negative for dissection. CT head for acute stroke, did show old left cerebellar infarct and nonspecific 5 mm calcification versus calcific lesion in the right temporo-occipital junction, and CTA head/neck negative for LVO or significant stenosis. Troponin significantly elevated, admitted with ACS, + NSTEMI, as well stroke pathway, cardiology and neuro consulted. Started on IV heparin. Loaded with Brilinta 9/2. Troponin peak at 2088. Brain MRI negative for acute process. Cardiac cath showed severe MVCAD. Echo showed LVEF 50% with mild septal and apical  HK, no valve disease. Arrangements made for transfer to [facility] for cardiac surgery eval for CABG.      9/5: Awaiting transfer. Vascular studies complete and satisfactory.      9/6: Seen in consult, neurosurgery consult for surgery clearance.     9/7: Continues on Heparin gtt. Started on Amlodipine 5mg daily. Plan for CABG Wednesday.     9/8: Sinus tach overnight. Remains on Heparin gtt. No CP, SOB.     9/9: Labs and vitals stable. On IV heparin. Denies CP/SOB. Discussed with pt and son. Son translated. Repeat T&S tomorrow.      9/10: Vitals stable. On IV heparin. Consent obtained. OR orders placed for CABG tomorrow.      9/11: CABG x 2.   Intraoperative blood products include: none.  No significant postoperative bleeding.  Transferred to ICU supported with no gtts.  Wean towards extubation.      9/12: Extubated, on RA. CI >3, swan d/c’d. Cardene @ 5, A-line remains. Wean cardene. Start Lopressor 25 bid. Start lasix 40 iv bid. D/c danelle. Endo consult for DM. Cont insulin gtt. Tx to tele     9/13: Tachycardic; Increase to Lopressor, 50mg PO BID. D/C chest tubes and pacing wires. Creatinine 0.93 today, from 0.93. Intake/Output net: - 1225  mL/24 hours. Transition to  PO Torsemide, 20 mg BID.      9/14: No telemetry events overnight. HR better controlled with Lopressor, 50mg PO BID. Transitionted to SSI. Insulin teaching ordered. Adequate response to oral diuretics.      9/15: No telemetry events. Ambulating independently. Fit for discharge to home.      Condition at Discharge:   good     Discharge Physical Exam:    Please see the documented physical exam from this morning's progress note for details.    Discharge Data:  Results from last 7 days   Lab Units 09/13/24  0554 09/12/24  0410 09/11/24 2009 09/11/24  1244 09/11/24  1240 09/11/24  0843 09/09/24  0441   WBC Thousand/uL 14.79* 13.77*  --   --   --   --  9.05   HEMOGLOBIN g/dL 10.2* 10.1* 9.9*  --  9.6*  --  11.6*   I STAT HEMOGLOBIN   --   --   --    < >  --    < >   --    HEMATOCRIT % 30.7* 30.0* 29.4*  --  28.2*  --  33.9*   HEMATOCRIT, ISTAT   --   --   --    < >  --    < >  --    PLATELETS Thousands/uL 240 205  --   --  183   < > 307    < > = values in this interval not displayed.     Results from last 7 days   Lab Units 09/14/24  0407 09/13/24  0554 09/12/24  0417 09/11/24  1604 09/11/24  1244   POTASSIUM mmol/L 4.4 4.3 4.2   < >  --    CHLORIDE mmol/L 104 105 110*  --   --    CO2 mmol/L 25 20* 19*  --   --    CO2, I-STAT mmol/L  --   --   --   --  22   BUN mg/dL 22 16 17  --   --    CREATININE mg/dL 1.25 0.93 0.96  --   --    GLUCOSE, ISTAT mg/dl  --   --   --   --  162*   CALCIUM mg/dL 9.3 9.5 9.1  --   --     < > = values in this interval not displayed.     Results from last 7 days   Lab Units 09/10/24  2334 09/10/24  1519 09/10/24  0502   PTT seconds 183* 27 94*       Discharge instructions/Information to patient and family:   See after visit summary for information provided to patient and family.      Aristoteles Toribio Reyes was educated on restrictions regarding driving and lifting, and techniques of proper incisional care.  They were specifically counselled on signs and symptoms of an incisional infection, and advised to contact our service immediately should they develop fevers, sweats, chill, redness or drainage at the site of any incisions.    Provisions for Follow-Up Care:  See after visit summary for information related to follow-up care and any pertinent home health orders.      Disposition:  Home    Planned Readmission:   No    Discharge Medications:  See after visit summary for reconciled discharge medications provided to patient and family.      Aristoteles Toribio Reyes was provided contact information and scheduled a follow up appointment with Shaun Hernandez D.O.  Additionally, follow up appointments have been scheduled for their primary care physician and primary cardiologist.  Contact information was provided.    Upon presentation, NSTEMI was  identified.    Aristoteles Toribio Reyes was counseled on the importance of avoiding tobacco products.  As with all patients whom have undergone open heart surgery, tobacco cessation medication was contraindicated at the time of discharge.     ACE/ARB was Contraindicated secondary to hypotension    Beta Blocker was Prescribed at discharge    Aspirin was Prescribed at discharge    Statin was Prescribed at discharge      The patient was discharged on ongoing diuretic therapy with Torsemide, 20 mg, PO QD and Potassium Chloride 20 mEq, PO QD.  They were advised to continue these medications for 7 days, unless otherwise directed.     Narcotic pain medication was prescribed in the form of Oxycodone.  Prior to prescribing, their prescription profile was reviewed on the Count includes the [facility] prescription drug monitoring program.    Aristoteles Toribio Reyes was prescribed injectable insulin for management of their pre-existing diabetes.  Insulin was prescribed as Lantus qHS and Humalog TID with meals, delivered via injectable pen system.  As injectable insulin is a new therapy for this patient, they were educated extensively on dosing and self-administration.  The patient was able to demonstrate competency with the pen needle system and felt comfortable doing so.      The patient was informed that following their postoperative surgical evaluation, they will be referred to outpatient cardiac rehabilitation.  They were counseled that this program is run by specialists who will help them safely strengthen their heart and prevent more heart disease.  Cardiac rehabilitation will include exercise, relaxation, stress management, and heart-healthy nutrition.  Caregivers will also check to make sure their medication regimen is working.    During this admission, the patient was questioned on their use of tobacco, alcohol, and illicit/non-prescription drug use in the  previous 24 months. During this time frame they admit to using  illicit/non-prescription drugs. As such they have been counseled on the importance of cessation and abstinence.     I spent 30 minutes discharging the patient. This time was spent on the day of discharge. I had direct contact with the patient on the day of discharge. Additional documentation is required if more than 30 minutes were spent on discharge.     SIGNATURE: Drew Daugherty PA-C  DATE: September 15, 2024  TIME: 9:38 AM

## 2024-09-23 ENCOUNTER — TELEPHONE (OUTPATIENT)
Age: 59
End: 2024-09-23

## 2024-09-23 NOTE — TELEPHONE ENCOUNTER
Caller: Milind Doe - son    Doctor: Mary    Reason for call: Requesting record of surgery (CABG) 9/11/24 mailed to patient's home for disability purposes. Confirmed patient address.    Call back#: [phone]

## 2024-09-26 ENCOUNTER — OFFICE VISIT (OUTPATIENT)
Dept: CARDIOLOGY CLINIC | Facility: CLINIC | Age: 59
End: 2024-09-26
Payer: COMMERCIAL

## 2024-09-26 VITALS
BODY MASS INDEX: 25.28 KG/M2 | OXYGEN SATURATION: 99 % | HEIGHT: 62 IN | SYSTOLIC BLOOD PRESSURE: 112 MMHG | HEART RATE: 65 BPM | DIASTOLIC BLOOD PRESSURE: 78 MMHG | WEIGHT: 137.4 LBS

## 2024-09-26 DIAGNOSIS — I25.10 CAD, MULTIPLE VESSEL: Primary | ICD-10-CM

## 2024-09-26 DIAGNOSIS — I10 PRIMARY HYPERTENSION: ICD-10-CM

## 2024-09-26 DIAGNOSIS — R29.90 STROKE-LIKE SYMPTOMS: ICD-10-CM

## 2024-09-26 DIAGNOSIS — E78.2 MIXED HYPERLIPIDEMIA: ICD-10-CM

## 2024-09-26 DIAGNOSIS — Z95.1 S/P CABG X 2: ICD-10-CM

## 2024-09-26 DIAGNOSIS — E11.59 TYPE 2 DIABETES MELLITUS WITH OTHER CIRCULATORY COMPLICATION, WITHOUT LONG-TERM CURRENT USE OF INSULIN (HCC): ICD-10-CM

## 2024-09-26 DIAGNOSIS — I21.4 NSTEMI (NON-ST ELEVATED MYOCARDIAL INFARCTION) (HCC): ICD-10-CM

## 2024-09-26 DIAGNOSIS — G47.01 INSOMNIA DUE TO MEDICAL CONDITION: ICD-10-CM

## 2024-09-26 PROCEDURE — 99214 OFFICE O/P EST MOD 30 MIN: CPT | Performed by: NURSE PRACTITIONER

## 2024-09-26 RX ORDER — ASPIRIN 81 MG/1
81 TABLET ORAL DAILY
COMMUNITY
End: 2024-09-26 | Stop reason: ALTCHOICE

## 2024-09-26 RX ORDER — TRAZODONE HYDROCHLORIDE 50 MG/1
50 TABLET, FILM COATED ORAL
COMMUNITY
Start: 2024-08-29

## 2024-09-26 RX ORDER — INSULIN LISPRO 100 [IU]/ML
INJECTION, SUSPENSION SUBCUTANEOUS
COMMUNITY
Start: 2024-06-24

## 2024-09-26 RX ORDER — BLOOD-GLUCOSE METER
EACH MISCELLANEOUS 2 TIMES DAILY
COMMUNITY
Start: 2024-06-24

## 2024-09-26 RX ORDER — LISINOPRIL 20 MG/1
TABLET ORAL
COMMUNITY
Start: 2024-06-24 | End: 2024-09-26 | Stop reason: ALTCHOICE

## 2024-09-26 RX ORDER — METFORMIN HYDROCHLORIDE EXTENDED-RELEASE TABLETS 1000 MG/1
1000 TABLET, FILM COATED, EXTENDED RELEASE ORAL
COMMUNITY

## 2024-09-26 RX ORDER — SILDENAFIL 100 MG/1
100 TABLET, FILM COATED ORAL
COMMUNITY
Start: 2024-08-12

## 2024-09-26 RX ORDER — MELOXICAM 15 MG/1
15 TABLET ORAL DAILY
COMMUNITY
Start: 2024-02-20 | End: 2024-09-26 | Stop reason: ALTCHOICE

## 2024-09-26 RX ORDER — ATORVASTATIN CALCIUM 80 MG/1
80 TABLET, FILM COATED ORAL
Qty: 30 TABLET | Refills: 2 | Status: SHIPPED | OUTPATIENT
Start: 2024-09-26

## 2024-09-26 RX ORDER — MELATONIN 3 MG
5 TABLET ORAL
Qty: 30 TABLET | Refills: 2 | Status: SHIPPED | OUTPATIENT
Start: 2024-09-26

## 2024-09-26 RX ORDER — UBIQUINOL 100 MG
CAPSULE ORAL
COMMUNITY
Start: 2024-06-24

## 2024-09-26 RX ORDER — LOSARTAN POTASSIUM 25 MG/1
25 TABLET ORAL DAILY
COMMUNITY
End: 2024-09-26 | Stop reason: ALTCHOICE

## 2024-09-26 RX ORDER — METOPROLOL TARTRATE 50 MG
50 TABLET ORAL EVERY 12 HOURS SCHEDULED
Qty: 60 TABLET | Refills: 2 | Status: SHIPPED | OUTPATIENT
Start: 2024-09-26

## 2024-09-26 RX ORDER — BLOOD SUGAR DIAGNOSTIC
STRIP MISCELLANEOUS 2 TIMES DAILY
COMMUNITY
Start: 2024-06-24

## 2024-09-26 RX ORDER — LANCETS 28 GAUGE
EACH MISCELLANEOUS 2 TIMES DAILY
COMMUNITY
Start: 2024-06-24

## 2024-09-26 RX ORDER — INSULIN DEGLUDEC 100 U/ML
INJECTION, SOLUTION SUBCUTANEOUS
COMMUNITY
Start: 2024-09-16

## 2024-09-26 RX ORDER — ATORVASTATIN CALCIUM 40 MG/1
TABLET, FILM COATED ORAL
COMMUNITY
Start: 2024-08-29 | End: 2024-09-26 | Stop reason: ALTCHOICE

## 2024-09-26 RX ORDER — TAMSULOSIN HYDROCHLORIDE 0.4 MG/1
0.4 CAPSULE ORAL DAILY
COMMUNITY
Start: 2024-08-12

## 2024-09-26 NOTE — PATIENT INSTRUCTIONS
Patient Education     Dieta mediterránea   Conceptos Básicos   Redactado por los médicos y editores de [employer]ToDate   ¿Qué es bridget dieta mediterránea? -- Bridget dieta mediterránea es bridget forma de comer saludable para el corazón. Incluye alimentos y estilos culinarios de muchos países que están alrededor kely Mediterráneo tales kian Di e Dorothea. Los alimentos exactos incluidos varían de un lugar a otro.  Bridget dieta mediterránea consiste en consumir muchas frutas, verduras, kaycee secos y cereales integrales. Se utiliza aceite de fuller en lugar de otras grasas. La dieta también contiene algo de pescado, aves y productos lácteos, carlton no bridget gran cantidad de rené martínez.  El vino suele considerarse parte de la dieta mediterránea. No es necesario y puede optar por no incluirlo. Si consume alcohol, limite la cantidad a:   Si es bridget persona de sexo femenino, no jarad más de bridget copa por día   Si es bridget persona de sexo masculino, no jarad más de dos copas por día  ¿Cuáles son los beneficios de bridget dieta mediterránea? -- Bridget dieta mediterránea puede ayudarlo a:   Mejorar gil carolynn general y a perder peso   Reducir el riesgo de sufrir un accidente cerebrovascular (derrame)   Reducir el riesgo de sufrir problemas cardíacos, kian un infarto   Controlar gil nivel de azúcar en radha si tiene diabetes  ¿Qué puedo comer y beber con bridget dieta mediterránea? -- La dieta mediterránea es más un patrón alimentario que bridget dieta estricta. Intente cubrir dos tercios de gil plato con frutas y verduras frescas. Algunos ejemplos de alimentos que suelen formar parte de talha patrón son:   Granos - Cereales integrales tales kian el pan y la pasta integrales, lizy, cuscús, arroz integral, cebada y orzo.   Frutas - Muchos tipos y colores de frutas frescas, congeladas, secas o enlatadas. Frutas congeladas o enlatadas con jugo 100% de fruta o agua (sin azúcar agregada). Los ejemplos incluyen manzanas, peras, bayas, melones, plátanos, ciruelas, pasas,  higos y melocotones.   Verduras - Muchos tipos y colores de verduras frescas, congeladas o enlatadas. Si están enlatadas, que brenda bajas en sodio o sin sal. Si están congeladas, que brenda sin grasas ni sodio añadidos. Los ejemplos incluyen aguacates, pimientos, tomates, espinacas, col rizada, frijoles, zanahorias, guisantes, aceitunas, pepinos, hummus, frijoles de soya, lentejas y frijoles rojos.   Lácteos - leche, queso y otros productos lácteos bajos en grasa. Yogur alee, kéfir y alternativas a la leche de origen vegetal kian la leche de soya.   Anju magras, aves, mariscos y proteínas - salmón, atún, bacalao y otros pescados. Camarones, almejas, vieiras y mejillones. Anju zach de suzan y pavo, huevos, frijoles secos, lentejas y tofu. Kaycee secos tales kian nueces de nogal, almendras, pecanas, avellanas, anacardos y mantequillas de kaycee secos. Semillas tales kian semillas de calabaza, sésamo, rafi y girasol.   Grasas, aceites y otros alimentos - Alimentos con grasas saludables que se encuentran en el pescado, los kaycee secos y los aguacates. Aceite de fuller o aceites vegetales tales kian el aceite de canola. Utilice cebollas, ajos, especias y hierbas para condimentar los alimentos.  ¿Qué alimentos y bebidas richard evitar o limitar con bridget dieta mediterránea? -- Bridget dieta mediterránea implica evitar o limitar ciertos tipos de alimentos. Trate de evitar los alimentos con aditivos tales kian los edulcorantes artificiales. Evite los alimentos procesados, refinados o conservados. Suelen ser alimentos con bridget christiano útil muy larga.   Cereales que se deben evitar - Pan lynn, pasta, arroz lynn, galletas de sal y galletas dulces.   Frutas que se deben evitar - Frutas enlatadas o congeladas con azúcar adicional.   Verduras que se deben evitar - Yessica y mezclas de verduras preparadas comercialmente, verduras y jugos enlatados comunes y verduras congeladas con salsa o verduras encurtidas.   Lácteos que se deben  evitar - Productos lácteos enteros tales kian queso, helado, leche entera, crema y kristen de mantequilla.   Anju magras, aves, mariscos y proteínas que se deben evitar - Anju martínez tales kian ternera, cerdo y kapoor. Anju procesadas tales kian salchichas, fiambres, salami, hot dogs y tocino.   Grasas, aceites y otros alimentos que se deben evitar - Mantequilla, margarina, manteca de cerdo, jugos de carne, salsas y aderezos para ensaladas. Galletas, pasteles, dulces, donas, panecillos y otros dulces.  Todos los artículos se actualizan a medida que se descubre nueva evidencia y culmina nuestro proceso de evaluación por homólogos   Rosa Maria artículo se recuperó de UpToDate el: Apr 17, 2024.  Artículo 707162 Versión 2.0.es-419.1  Release: 32.3.2 - C32.106  © 2024 UpToDate, Inc. Todos los derechos reservados.  Exención de responsabilidad y uso de la información del consumidor   Descargo de responsabilidad: esta información generalizada es un resumen limitado de información sobre el diagnóstico, el tratamiento y/o los medicamentos. No pretende ser exhaustiva y se debe utilizar kian herramienta para ayudar al usuario a comprender y/o evaluar las posibles opciones de diagnóstico y tratamiento. No incluye toda la información sobre afecciones, tratamientos, medicamentos, efectos secundarios o riesgos puedan ser aplicables a un paciente específico. No tiene el propósito de servir kian recomendación médica ni de sustituir la recomendación médica, el diagnóstico o el tratamiento de un profesional de atención médica que se base en el examen y la evaluación de rosa maria profesional de la carolynn respecto a las circunstancias específicas y únicas del paciente. Los pacientes deben hablar con un profesional de atención médica para obtener información completa sobre gil carolynn, cuestiones médicas y opciones de tratamiento, incluidos los riesgos o los beneficios relacionados con el uso de medicamentos. Esta información no certifica que los  tratamientos o medicamentos brenda seguros, eficaces o estén aprobados para tratar a un paciente específico. [employer] y catracho afiliados renuncian a cualquier garantía o responsabilidad relacionada con esta información o el uso de la misma.El uso de esta información está sujeto a las Condiciones de uso, disponibles en https://www.Hariruwer.com/en/know/clinical-effectiveness-terms. 2024© [employer] [facility] y catracho afiliados y/o licenciantes. Todos los derechos reservados.  Copyright   © 2024 [employer] [facility] [employer]. Todos los derechos reservados.

## 2024-09-26 NOTE — ASSESSMENT & PLAN NOTE
/78  On metoprolol tartrate 50 mg every 12, loop diuretic  And back lisinopril with vital signs and renal function permit

## 2024-09-26 NOTE — ASSESSMENT & PLAN NOTE
On atorvastatin 80 mg daily) uptirated from 40 mg)  Ernesto need follow up liids in the future     Latest Reference Range & Units 09/02/24 10:06   Cholesterol See Comment mg/dL 162   Triglycerides See Comment mg/dL 185 (H)   HDL >=40 mg/dL 49   Non-HDL Cholesterol mg/dl 113   LDL Calculated 0 - 100 mg/dL 76

## 2024-09-26 NOTE — ASSESSMENT & PLAN NOTE
S/P CABG x 2; LIMA- LAD, VG- PDA 9/ /24, adm 9/5-9/15/24  On aspirin, statin, beta-blocker  Final intraoperative MARY: EF 50-55% with improved RWMA

## 2024-09-26 NOTE — TELEPHONE ENCOUNTER
" Shyla [id number]    Pt called stating his son s/w someone in cardiovascular office yesterday and was advised he can  his records.  He will be there in approx 45\". Advised I would make office aware.    If any ? Please call pt back.  "

## 2024-09-26 NOTE — PROGRESS NOTES
Cardiology   Hospital Follow Up   Office Visit Note  Aristoteles Toribio Reyes   59 y.o.   male   MRN: [id number]  [facility] [place]  [address]  [address]  [place] [address] [phone]    PCP: No primary care provider on file.  Cardiologist: will be a cardiologist in [place]    [id number] Sp  [place]                Summary of Plan:  Heart healthy diet: Mediterranean or Dash.  Written education provided  BMP  Cardiac rehab when cleared by CT surgery  Meds refilled   Will need follow up FLP in the future  F/U CTS 10/7/24  Follow-up with a cardiologist in [facility]- he resides in [place] ( he does to have the name with him now). He tells me the phone # is [phone].  Consider adding in an ACE-I given his diabetes if his VS permit        Assessment & Plan  CAD, multiple vessel  S/P CABG x 2; LIMA- LAD, VG- PDA 9/ /24, adm 9/5-9/15/24  On aspirin, statin, beta-blocker  Final intraoperative MARY: EF 50-55% with improved RWMA   NSTEMI (non-ST elevated myocardial infarction) ([facility])    NSTEMI 9/2/24;  status post CABG with LIMA to LAD, SVG to RPDA on 9/11/2024    Type 2 diabetes mellitus with other circulatory complication, without long-term current use of insulin ([facility])    Lab Results   Component Value Date    HGBA1C 8.4 (H) 09/02/2024   On insulin, per endocrinology or his PCP  Mixed hyperlipidemia  On atorvastatin 80 mg daily) uptirated from 40 mg)  Ernesto need follow up liids in the future     Latest Reference Range & Units 09/02/24 10:06   Cholesterol See Comment mg/dL 162   Triglycerides See Comment mg/dL 185 (H)   HDL >=40 mg/dL 49   Non-HDL Cholesterol mg/dl 113   LDL Calculated 0 - 100 mg/dL 76     Primary hypertension  /78  On metoprolol tartrate 50 mg every 12, loop diuretic  And back lisinopril with vital signs and renal function permit  Stroke-like symptoms old CVA on CT  Evaluated by neurology and cleared for AC/AP  Neurology signed off at  [facility]     Insomnia  due to medical condition  I prescribed melatonin 5 mg as needed  Cardiac testing  [facility] 9/4/2024  Multivessel CA; severe multivessel diffuse disease   Mid RCA 80% segmental stenosis   Prox to mid LAD diffuse 80-90% stenosis might have decent landing LIMA target distally  Diagonal 1 has 80% but smaller caliber   Lcx/Om1 has 30% stenosis   TTE 9/4/2024.  EF 50%.  Systolic function low normal.  Mild septal and apical hypokinesis.  Grade 1 DD.                    HPI  Aristoteles Toribio Reyes is a 59 y.o.year old with diabetes, hypertension, dyslipidemia and prior stroke. He does use cannabis.      Adm 9/2-->9/5-->9/15/24  [facility]  CC CP. Left leg weakness  EKG: NSR. No acute ST segment changes  BP significantly elevated; he had not taken his lisinopril  HS trop 517, peak at 2088  Dx NSTEMI by. Also stroke pathway  CTH: no ICH; chronic appearing inferior left cerebellar infarct. Finding of nonspecific 5 mm calcification vs. Calcific lesion in right temporo-occipital junction  CTA H/N: negative for LVO or significant stenosis.   Brain MRI: no acute process  Dx NSTEMI  Eval by Cardiology and NS  Echo: EF 50% with mild septal and apical HK, no valve disease  [facility]: severe MVCAD.  Transferred to [facility]  CABG x 2, LIMA- LAD, VG-PDA  Dc cardiac meds:  mg/d, atorvastatin 80 mg daily, Toprol tartrate 50 mg every 12, torsemide 20 mg daily, potassium 20 mEq daily  Dc wt: 134 lb  Lisinopril held given low BP    9/26/24  Follow-up after bypass surgery.  He is accompanied by his wife who is in the waiting room.  He is abiding by the postoperative restrictions.  Use  # [id number] [place]  Overall he tells me he is doing well.  He is using acetaminophen as needed  He has minor discomfort of his chest.  His incision is doing well there is no erythema or drainage.  Skin edges well-approximated  He finished his diuretic and potassium  His medication list was reconciled  His blood pressure today 112/78  His heart  tones are regular  His weight is 137 his weight September 4 was 134 pounds it did drop to 126  He tells me he will follow with a cardiologist in [place].  He does have an appointment in October.  He does not recall the name.  He does provide the phone number which is listed at the top of my page.    He presented initially to the [facility] cardiology [facility] given he was they are vacationing for a few days.  He would like to participate in cardiac rehab close to where he resides.  He has lab work scheduled at the beginning of October  He was advised that he needs a repeat fasting lipid profile.  This can be obtained by his new cardiologist  His medication list was reconciled at his request  Medications were refilled  He was prior to information regarding heart healthy diet, in [place]      Review of Systems   Constitutional: Negative for chills.   Cardiovascular:  Negative for chest pain, claudication, cyanosis, dyspnea on exertion, irregular heartbeat, leg swelling, near-syncope, orthopnea, palpitations, paroxysmal nocturnal dyspnea and syncope.   Respiratory:  Negative for cough and shortness of breath.    Gastrointestinal:  Negative for heartburn and nausea.   Neurological:  Negative for dizziness, focal weakness, headaches, light-headedness and weakness.   All other systems reviewed and are negative.            Assessment  Diagnoses and all orders for this visit:    CAD, multiple vessel    NSTEMI (non-ST elevated myocardial infarction) (HCC)    Type 2 diabetes mellitus with other circulatory complication, without long-term current use of insulin (HCC)    Mixed hyperlipidemia    Primary hypertension    Stroke-like symptoms old CVA on CT    Insomnia due to medical condition  -     Melatonin 3-10 MG TABS; Take 5 mg by mouth daily at bedtime as needed (insomnia)    S/P CABG x 2  -     metoprolol tartrate (LOPRESSOR) 50 mg tablet; Take 1 tablet (50 mg total) by mouth every 12 (twelve) hours  -      atorvastatin (LIPITOR) 80 mg tablet; Take 1 tablet (80 mg total) by mouth daily with dinner    Other orders  -     Alcohol Swabs (Alcohol Prep) 70 % PADS; USE WITH TESTING SUPPLIES AS DIRECTED  -     Blood Glucose Monitoring Suppl (Prodigy Pocket Blood Glucose) w/Device KIT; 2 (two) times a day Test  -     Prodigy No Coding Blood Gluc test strip; 2 (two) times a day Test  -     Tresiba FlexTouch 100 units/mL injection pen  -     Insulin Lispro Prot & Lispro (Insulin Lispro Prot & Lispro) (75-25) 100 units/mL injection pen; INJECT 28 UNITS UNDER THE SKIN TWICE DAILY WITH MEALS  -     Prodigy Twist Top Lancets 28G MISC; 2 (two) times a day Test  -     Discontinue: lisinopril (ZESTRIL) 20 mg tablet;  (Patient not taking: Reported on 9/26/2024)  -     Discontinue: losartan (COZAAR) 25 mg tablet; Take 25 mg by mouth daily  -     Discontinue: meloxicam (MOBIC) 15 mg tablet; Take 15 mg by mouth daily  -     metFORMIN (FORTAMET) 1000 MG (OSM) 24 hr tablet; Take 1,000 mg by mouth daily with breakfast  -     metFORMIN (GLUCOPHAGE) 500 mg tablet;  (Patient not taking: Reported on 9/26/2024)  -     sildenafil (VIAGRA) 100 mg tablet; Take 100 mg by mouth (Patient not taking: Reported on 9/26/2024)  -     tamsulosin (FLOMAX) 0.4 mg; Take 0.4 mg by mouth daily (Patient not taking: Reported on 9/26/2024)  -     traZODone (DESYREL) 50 mg tablet; Take 50 mg by mouth daily at bedtime  -     Discontinue: atorvastatin (LIPITOR) 40 mg tablet  -     Discontinue: aspirin (ECOTRIN LOW STRENGTH) 81 mg EC tablet; Take 81 mg by mouth daily (Patient not taking: Reported on 9/26/2024)        Past Medical History:   Diagnosis Date    History of cardioembolic cerebrovascular accident (CVA) 09/02/2024    History of cardioembolic cerebrovascular accident (CVA) 09/02/2024       Past Surgical History:   Procedure Laterality Date    CARDIAC CATHETERIZATION N/A 9/4/2024    Procedure: Cardiac catheterization;  Surgeon: Juan Fuentes MD;  Location: [facility]  CARD[facility]ATH LAB;  Service: Cardiology    VT CORONARY ARTERY BYP W/VEIN & ARTERY GRAFT 2 VEIN N/A 9/11/2024    Procedure: CORONARY ARTERY BYPASS GRAFT (CABG) 2 VESSELS, LIMA-LAD,SVG/EVH-PDA, MARY;  Surgeon: Shaun Hernandez DO;  Location: [facility];  Service: Cardiac Surgery           Allergies  No Known Allergies      Medications    Current Outpatient Medications:     acetaminophen (TYLENOL) 650 mg CR tablet, Take 1 tablet (650 mg total) by mouth every 8 (eight) hours as needed for mild pain, Disp: 30 tablet, Rfl: 0    Alcohol Swabs (Alcohol Prep) 70 % PADS, USE WITH TESTING SUPPLIES AS DIRECTED, Disp: , Rfl:     aspirin 325 mg tablet, Take 1 tablet (325 mg total) by mouth daily, Disp: 30 tablet, Rfl: 2    atorvastatin (LIPITOR) 80 mg tablet, Take 1 tablet (80 mg total) by mouth daily with dinner, Disp: 30 tablet, Rfl: 2    Blood Glucose Monitoring Suppl (Prodigy Pocket Blood Glucose) w/Device KIT, 2 (two) times a day Test, Disp: , Rfl:     docusate sodium (COLACE) 100 mg capsule, Take 1 capsule (100 mg total) by mouth 2 (two) times a day, Disp: 60 capsule, Rfl: 0    Insulin Glargine Solostar (Lantus SoloStar) 100 UNIT/ML SOPN, Inject 0.2 mL (20 Units total) under the skin daily at bedtime, Disp: 6 mL, Rfl: 2    insulin lispro (HumaLOG KwikPen) 100 units/mL injection pen, Inject 5 Units under the skin 3 (three) times a day with meals, Disp: 4.5 mL, Rfl: 2    Insulin Lispro Prot & Lispro (Insulin Lispro Prot & Lispro) (75-25) 100 units/mL injection pen, INJECT 28 UNITS UNDER THE SKIN TWICE DAILY WITH MEALS, Disp: , Rfl:     Insulin Pen Needle 32G X 4 MM MISC, Inject Lantus qHS and Novolog/Humalog TID with meals, as directed. Dx: DM Ell.9, Disp: 100 each, Rfl: 0    Melatonin 3-10 MG TABS, Take 5 mg by mouth daily at bedtime as needed (insomnia), Disp: 30 tablet, Rfl: 2    metFORMIN (FORTAMET) 1000 MG (OSM) 24 hr tablet, Take 1,000 mg by mouth daily with breakfast, Disp: , Rfl:     metoprolol tartrate (LOPRESSOR) 50 mg  tablet, Take 1 tablet (50 mg total) by mouth every 12 (twelve) hours, Disp: 60 tablet, Rfl: 2    Prodigy No Coding Blood Gluc test strip, 2 (two) times a day Test, Disp: , Rfl:     Prodigy Twist Top Lancets 28G MISC, 2 (two) times a day Test, Disp: , Rfl:     traZODone (DESYREL) 50 mg tablet, Take 50 mg by mouth daily at bedtime, Disp: , Rfl:     Tresiba FlexTouch 100 units/mL injection pen, , Disp: , Rfl:     metFORMIN (GLUCOPHAGE) 500 mg tablet, , Disp: , Rfl:     polyethylene glycol (GLYCOLAX) 17 GM/SCOOP powder, Take 17 g by mouth daily (Patient not taking: Reported on 9/26/2024), Disp: 510 g, Rfl: 0    sildenafil (VIAGRA) 100 mg tablet, Take 100 mg by mouth (Patient not taking: Reported on 9/26/2024), Disp: , Rfl:     tamsulosin (FLOMAX) 0.4 mg, Take 0.4 mg by mouth daily (Patient not taking: Reported on 9/26/2024), Disp: , Rfl:       Social History     Socioeconomic History    Marital status: /Civil Union     Spouse name: Not on file    Number of children: Not on file    Years of education: Not on file    Highest education level: Not on file   Occupational History    Not on file   Tobacco Use    Smoking status: Some Days     Types: Cigarettes    Smokeless tobacco: Not on file   Substance and Sexual Activity    Alcohol use: Never    Drug use: Never    Sexual activity: Not on file   Other Topics Concern    Not on file   Social History Narrative    Not on file     Social Determinants of Health     Financial Resource Strain: Not on file   Food Insecurity: No Food Insecurity (9/4/2024)    Hunger Vital Sign     Worried About Running Out of Food in the Last Year: Never true     Ran Out of Food in the Last Year: Never true   Transportation Needs: No Transportation Needs (9/4/2024)    PRAPARE - Transportation     Lack of Transportation (Medical): No     Lack of Transportation (Non-Medical): No   Physical Activity: Not on file   Stress: Not on file   Social Connections: Not on file   Intimate Partner Violence:  "Not on file   Housing Stability: Low Risk  (9/4/2024)    Housing Stability Vital Sign     Unable to Pay for Housing in the Last Year: No     Number of Times Moved in the Last Year: 0     Homeless in the Last Year: No       History reviewed. No pertinent family history.    Physical Exam  Vitals and nursing note reviewed.   Constitutional:       General: He is not in acute distress.  HENT:      Head: Normocephalic and atraumatic.   Eyes:      Extraocular Movements: Extraocular movements intact.      Conjunctiva/sclera: Conjunctivae normal.   Cardiovascular:      Rate and Rhythm: Normal rate and regular rhythm.      Pulses: Intact distal pulses.      Heart sounds: Normal heart sounds.   Pulmonary:      Effort: Pulmonary effort is normal.      Breath sounds: Normal breath sounds.   Abdominal:      General: Bowel sounds are normal.      Palpations: Abdomen is soft.   Musculoskeletal:         General: Normal range of motion.      Cervical back: Normal range of motion and neck supple.   Skin:     General: Skin is warm and dry.      Comments: Chest and leg incision healing well.  Skin edges well-approximated no erythema or drainage.   Neurological:      Mental Status: He is alert and oriented to person, place, and time.   Psychiatric:         Mood and Affect: Mood normal.         Vitals: Blood pressure 112/78, pulse 65, height 5' 2\" (1.575 m), weight 62.3 kg (137 lb 6.4 oz), SpO2 99%.   Wt Readings from Last 3 Encounters:   09/26/24 62.3 kg (137 lb 6.4 oz)   09/15/24 57.3 kg (126 lb 5.2 oz)   09/04/24 60.8 kg (134 lb)           Labs & Results:  Lab Results   Component Value Date    WBC 14.79 (H) 09/13/2024    HGB 10.2 (L) 09/13/2024    HCT 30.7 (L) 09/13/2024    MCV 93 09/13/2024     09/13/2024     BNP   Date Value Ref Range Status   09/02/2024 242 (H) 0 - 100 pg/mL Final     No components found for: \"CHEM\"  No results found for: \"CKTOTAL\", \"TROPONINI\", \"TROPONINT\", \"CKMBINDEX\"  No results found for this or any " previous visit.    No results found for this or any previous visit.    No valid procedures specified.  No results found for this or any previous visit.                This note was completed in part utilizing [employer]umm[employer] direct voice recognition software.   Grammatical errors, random word insertion, spelling mistakes, and incomplete sentences may be an occasional consequence of the system secondary to software limitations, ambient noise and hardware issues. At the time of dictation, efforts were made to edit, clarify and /or correct errors.  Please read the chart carefully and recognize, using context, where substitutions have occurred.  If you have any questions or concerns about the context, text or information contained within the body of this dictation, please contact myself, the provider, for further clarification

## 2024-09-26 NOTE — LETTER
September 26, 2024     SRINIVAS Longoria  [address]  [address]  [facility] [address]    Patient: Aristoteles Toribio Reyes   YOB: 1965   Date of Visit: 9/26/2024       Dear Dr. Olson:    Thank you for referring Aristoteles Toribio Reyes to me for evaluation. Below are my notes for this consultation.    If you have questions, please do not hesitate to call me. I look forward to following your patient along with you.         Sincerely,        SRINIVAS Longoria        CC: No Recipients    SRINIVAS oLngoria  9/26/2024  2:16 PM  Sign when Signing Visit  Cardiology   Hospital Follow Up   Office Visit Note  Aristoteles Toribio Reyes   59 y.o.   male   MRN: [id number]  [facility] [place]  [address] [place] [address]  [facility] [address] [phone]    PCP: No primary care provider on file.  Cardiologist: will be a cardiologist in [place]    [id number]  Cruzito                Summary of Plan:  Heart healthy diet: Mediterranean or Dash.  Written education provided  BMP  Cardiac rehab when cleared by CT surgery  Meds refilled   Will need follow up FLP in the future  F/U CTS 10/7/24  Follow-up with a cardiologist in [facility]- he resides in [place] ( he does to have the name with him now). He tells me the phone # is [phone].  Consider adding in an ACE-I given his diabetes if his VS permit        Assessment & Plan  CAD, multiple vessel  S/P CABG x 2; LIMA- LAD, VG- PDA 9/ /24, adm 9/5-9/15/24  On aspirin, statin, beta-blocker  Final intraoperative MARY: EF 50-55% with improved RWMA   NSTEMI (non-ST elevated myocardial infarction) ([facility])    NSTEMI 9/2/24;  status post CABG with LIMA to LAD, SVG to RPDA on 9/11/2024    Type 2 diabetes mellitus with other circulatory complication, without long-term current use of insulin (HCC)    Lab Results   Component Value Date    HGBA1C 8.4 (H) 09/02/2024   On insulin, per endocrinology or his PCP  Mixed hyperlipidemia  On  atorvastatin 80 mg daily) uptirated from 40 mg)  Ernesto need follow up liids in the future     Latest Reference Range & Units 09/02/24 10:06   Cholesterol See Comment mg/dL 162   Triglycerides See Comment mg/dL 185 (H)   HDL >=40 mg/dL 49   Non-HDL Cholesterol mg/dl 113   LDL Calculated 0 - 100 mg/dL 76     Primary hypertension  /78  On metoprolol tartrate 50 mg every 12, loop diuretic  And back lisinopril with vital signs and renal function permit  Stroke-like symptoms old CVA on CT  Evaluated by neurology and cleared for AC/AP  Neurology signed off at [facility]     Insomnia due to medical condition  I prescribed melatonin 5 mg as needed  Cardiac testing  [facility] 9/4/2024  Multivessel CA; severe multivessel diffuse disease   Mid RCA 80% segmental stenosis   Prox to mid LAD diffuse 80-90% stenosis might have decent landing LIMA target distally  Diagonal 1 has 80% but smaller caliber   Lcx/Om1 has 30% stenosis   TTE 9/4/2024.  EF 50%.  Systolic function low normal.  Mild septal and apical hypokinesis.  Grade 1 DD.                    HPI  Jeanakoltonles Toribio Reyes is a 59 y.o.year old with diabetes, hypertension, dyslipidemia and prior stroke. He does use cannabis.      Adm 9/2-->9/5-->9/15/24 [facility]--> [facility]  CC CP. Left leg weakness  EKG: NSR. No acute ST segment changes  BP significantly elevated; he had not taken his lisinopril  HS trop 517, peak at 2088  Dx NSTEMI by. Also stroke pathway  CTH: no ICH; chronic appearing inferior left cerebellar infarct. Finding of nonspecific 5 mm calcification vs. Calcific lesion in right temporo-occipital junction  CTA H/N: negative for LVO or significant stenosis.   Brain MRI: no acute process  Dx NSTEMI  Eval by Cardiology and NS  Echo: EF 50% with mild septal and apical HK, no valve disease  [facility]: severe MVCAD.  Transferred to [facility]  CABG x 2, LIMA- LAD, VG-PDA  Dc cardiac meds:  mg/d, atorvastatin 80 mg daily, Toprol tartrate 50 mg every 12, torsemide 20 mg daily,  potassium 20 mEq daily  Dc wt: 134 lb  Lisinopril held given low BP    9/26/24  Follow-up after bypass surgery.  He is accompanied by his wife who is in the waiting room.  He is abiding by the postoperative restrictions.  Use  # [id number] Cruzito  Overall he tells me he is doing well.  He is using acetaminophen as needed  He has minor discomfort of his chest.  His incision is doing well there is no erythema or drainage.  Skin edges well-approximated  He finished his diuretic and potassium  His medication list was reconciled  His blood pressure today 112/78  His heart tones are regular  His weight is 137 his weight September 4 was 134 pounds it did drop to 126  He tells me he will follow with a cardiologist in [place].  He does have an appointment in October.  He does not recall the name.  He does provide the phone number which is listed at the top of my page.    He presented initially to the [facility] cardiology [facility] given he was they are vacationing for a few days.  He would like to participate in cardiac rehab close to where he resides.  He has lab work scheduled at the beginning of October  He was advised that he needs a repeat fasting lipid profile.  This can be obtained by his new cardiologist  His medication list was reconciled at his request  Medications were refilled  He was prior to information regarding heart healthy diet, in Thai      Review of Systems   Constitutional: Negative for chills.   Cardiovascular:  Negative for chest pain, claudication, cyanosis, dyspnea on exertion, irregular heartbeat, leg swelling, near-syncope, orthopnea, palpitations, paroxysmal nocturnal dyspnea and syncope.   Respiratory:  Negative for cough and shortness of breath.    Gastrointestinal:  Negative for heartburn and nausea.   Neurological:  Negative for dizziness, focal weakness, headaches, light-headedness and weakness.   All other systems reviewed and are  negative.            Assessment  Diagnoses and all orders for this visit:    CAD, multiple vessel    NSTEMI (non-ST elevated myocardial infarction) (HCC)    Type 2 diabetes mellitus with other circulatory complication, without long-term current use of insulin (HCC)    Mixed hyperlipidemia    Primary hypertension    Stroke-like symptoms old CVA on CT    Insomnia due to medical condition  -     Melatonin 3-10 MG TABS; Take 5 mg by mouth daily at bedtime as needed (insomnia)    S/P CABG x 2  -     metoprolol tartrate (LOPRESSOR) 50 mg tablet; Take 1 tablet (50 mg total) by mouth every 12 (twelve) hours  -     atorvastatin (LIPITOR) 80 mg tablet; Take 1 tablet (80 mg total) by mouth daily with dinner    Other orders  -     Alcohol Swabs (Alcohol Prep) 70 % PADS; USE WITH TESTING SUPPLIES AS DIRECTED  -     Blood Glucose Monitoring Suppl (Prodigy Pocket Blood Glucose) w/Device KIT; 2 (two) times a day Test  -     Prodigy No Coding Blood Gluc test strip; 2 (two) times a day Test  -     Tresiba FlexTouch 100 units/mL injection pen  -     Insulin Lispro Prot & Lispro (Insulin Lispro Prot & Lispro) (75-25) 100 units/mL injection pen; INJECT 28 UNITS UNDER THE SKIN TWICE DAILY WITH MEALS  -     Prodigy Twist Top Lancets 28G MISC; 2 (two) times a day Test  -     Discontinue: lisinopril (ZESTRIL) 20 mg tablet;  (Patient not taking: Reported on 9/26/2024)  -     Discontinue: losartan (COZAAR) 25 mg tablet; Take 25 mg by mouth daily  -     Discontinue: meloxicam (MOBIC) 15 mg tablet; Take 15 mg by mouth daily  -     metFORMIN (FORTAMET) 1000 MG (OSM) 24 hr tablet; Take 1,000 mg by mouth daily with breakfast  -     metFORMIN (GLUCOPHAGE) 500 mg tablet;  (Patient not taking: Reported on 9/26/2024)  -     sildenafil (VIAGRA) 100 mg tablet; Take 100 mg by mouth (Patient not taking: Reported on 9/26/2024)  -     tamsulosin (FLOMAX) 0.4 mg; Take 0.4 mg by mouth daily (Patient not taking: Reported on 9/26/2024)  -     traZODone  (DESYREL) 50 mg tablet; Take 50 mg by mouth daily at bedtime  -     Discontinue: atorvastatin (LIPITOR) 40 mg tablet  -     Discontinue: aspirin (ECOTRIN LOW STRENGTH) 81 mg EC tablet; Take 81 mg by mouth daily (Patient not taking: Reported on 9/26/2024)        Past Medical History:   Diagnosis Date   • History of cardioembolic cerebrovascular accident (CVA) 09/02/2024   • History of cardioembolic cerebrovascular accident (CVA) 09/02/2024       Past Surgical History:   Procedure Laterality Date   • CARDIAC CATHETERIZATION N/A 9/4/2024    Procedure: Cardiac catheterization;  Surgeon: Juan Fuentes MD;  Location: [facility];  Service: Cardiology   • PA CORONARY ARTERY BYP W/VEIN & ARTERY GRAFT 2 VEIN N/A 9/11/2024    Procedure: CORONARY ARTERY BYPASS GRAFT (CABG) 2 VESSELS, LIMA-LAD,SVG/EVH-PDA, MARY;  Surgeon: Shaun Hernandez DO;  Location: [facility];  Service: Cardiac Surgery           Allergies  No Known Allergies      Medications    Current Outpatient Medications:   •  acetaminophen (TYLENOL) 650 mg CR tablet, Take 1 tablet (650 mg total) by mouth every 8 (eight) hours as needed for mild pain, Disp: 30 tablet, Rfl: 0  •  Alcohol Swabs (Alcohol Prep) 70 % PADS, USE WITH TESTING SUPPLIES AS DIRECTED, Disp: , Rfl:   •  aspirin 325 mg tablet, Take 1 tablet (325 mg total) by mouth daily, Disp: 30 tablet, Rfl: 2  •  atorvastatin (LIPITOR) 80 mg tablet, Take 1 tablet (80 mg total) by mouth daily with dinner, Disp: 30 tablet, Rfl: 2  •  Blood Glucose Monitoring Suppl (Prodigy Pocket Blood Glucose) w/Device KIT, 2 (two) times a day Test, Disp: , Rfl:   •  docusate sodium (COLACE) 100 mg capsule, Take 1 capsule (100 mg total) by mouth 2 (two) times a day, Disp: 60 capsule, Rfl: 0  •  Insulin Glargine Solostar (Lantus SoloStar) 100 UNIT/ML SOPN, Inject 0.2 mL (20 Units total) under the skin daily at bedtime, Disp: 6 mL, Rfl: 2  •  insulin lispro (HumaLOG KwikPen) 100 units/mL injection pen, Inject 5 Units under  the skin 3 (three) times a day with meals, Disp: 4.5 mL, Rfl: 2  •  Insulin Lispro Prot & Lispro (Insulin Lispro Prot & Lispro) (75-25) 100 units/mL injection pen, INJECT 28 UNITS UNDER THE SKIN TWICE DAILY WITH MEALS, Disp: , Rfl:   •  Insulin Pen Needle 32G X 4 MM MISC, Inject Lantus qHS and Novolog/Humalog TID with meals, as directed. Dx: DM Ell.9, Disp: 100 each, Rfl: 0  •  Melatonin 3-10 MG TABS, Take 5 mg by mouth daily at bedtime as needed (insomnia), Disp: 30 tablet, Rfl: 2  •  metFORMIN (FORTAMET) 1000 MG (OSM) 24 hr tablet, Take 1,000 mg by mouth daily with breakfast, Disp: , Rfl:   •  metoprolol tartrate (LOPRESSOR) 50 mg tablet, Take 1 tablet (50 mg total) by mouth every 12 (twelve) hours, Disp: 60 tablet, Rfl: 2  •  Prodigy No Coding Blood Gluc test strip, 2 (two) times a day Test, Disp: , Rfl:   •  Prodigy Twist Top Lancets 28G MISC, 2 (two) times a day Test, Disp: , Rfl:   •  traZODone (DESYREL) 50 mg tablet, Take 50 mg by mouth daily at bedtime, Disp: , Rfl:   •  Tresiba FlexTouch 100 units/mL injection pen, , Disp: , Rfl:   •  metFORMIN (GLUCOPHAGE) 500 mg tablet, , Disp: , Rfl:   •  polyethylene glycol (GLYCOLAX) 17 GM/SCOOP powder, Take 17 g by mouth daily (Patient not taking: Reported on 9/26/2024), Disp: 510 g, Rfl: 0  •  sildenafil (VIAGRA) 100 mg tablet, Take 100 mg by mouth (Patient not taking: Reported on 9/26/2024), Disp: , Rfl:   •  tamsulosin (FLOMAX) 0.4 mg, Take 0.4 mg by mouth daily (Patient not taking: Reported on 9/26/2024), Disp: , Rfl:       Social History     Socioeconomic History   • Marital status: /Civil Union     Spouse name: Not on file   • Number of children: Not on file   • Years of education: Not on file   • Highest education level: Not on file   Occupational History   • Not on file   Tobacco Use   • Smoking status: Some Days     Types: Cigarettes   • Smokeless tobacco: Not on file   Substance and Sexual Activity   • Alcohol use: Never   • Drug use: Never   • Sexual  activity: Not on file   Other Topics Concern   • Not on file   Social History Narrative   • Not on file     Social Determinants of Health     Financial Resource Strain: Not on file   Food Insecurity: No Food Insecurity (9/4/2024)    Hunger Vital Sign    • Worried About Running Out of Food in the Last Year: Never true    • Ran Out of Food in the Last Year: Never true   Transportation Needs: No Transportation Needs (9/4/2024)    PRAPARE - Transportation    • Lack of Transportation (Medical): No    • Lack of Transportation (Non-Medical): No   Physical Activity: Not on file   Stress: Not on file   Social Connections: Not on file   Intimate Partner Violence: Not on file   Housing Stability: Low Risk  (9/4/2024)    Housing Stability Vital Sign    • Unable to Pay for Housing in the Last Year: No    • Number of Times Moved in the Last Year: 0    • Homeless in the Last Year: No       History reviewed. No pertinent family history.    Physical Exam  Vitals and nursing note reviewed.   Constitutional:       General: He is not in acute distress.  HENT:      Head: Normocephalic and atraumatic.   Eyes:      Extraocular Movements: Extraocular movements intact.      Conjunctiva/sclera: Conjunctivae normal.   Cardiovascular:      Rate and Rhythm: Normal rate and regular rhythm.      Pulses: Intact distal pulses.      Heart sounds: Normal heart sounds.   Pulmonary:      Effort: Pulmonary effort is normal.      Breath sounds: Normal breath sounds.   Abdominal:      General: Bowel sounds are normal.      Palpations: Abdomen is soft.   Musculoskeletal:         General: Normal range of motion.      Cervical back: Normal range of motion and neck supple.   Skin:     General: Skin is warm and dry.      Comments: Chest and leg incision healing well.  Skin edges well-approximated no erythema or drainage.   Neurological:      Mental Status: He is alert and oriented to person, place, and time.   Psychiatric:         Mood and Affect: Mood normal.  "        Vitals: Blood pressure 112/78, pulse 65, height 5' 2\" (1.575 m), weight 62.3 kg (137 lb 6.4 oz), SpO2 99%.   Wt Readings from Last 3 Encounters:   09/26/24 62.3 kg (137 lb 6.4 oz)   09/15/24 57.3 kg (126 lb 5.2 oz)   09/04/24 60.8 kg (134 lb)           Labs & Results:  Lab Results   Component Value Date    WBC 14.79 (H) 09/13/2024    HGB 10.2 (L) 09/13/2024    HCT 30.7 (L) 09/13/2024    MCV 93 09/13/2024     09/13/2024     BNP   Date Value Ref Range Status   09/02/2024 242 (H) 0 - 100 pg/mL Final     No components found for: \"CHEM\"  No results found for: \"CKTOTAL\", \"TROPONINI\", \"TROPONINT\", \"CKMBINDEX\"  No results found for this or any previous visit.    No results found for this or any previous visit.    No valid procedures specified.  No results found for this or any previous visit.                This note was completed in part utilizing [employer] direct voice recognition software.   Grammatical errors, random word insertion, spelling mistakes, and incomplete sentences may be an occasional consequence of the system secondary to software limitations, ambient noise and hardware issues. At the time of dictation, efforts were made to edit, clarify and /or correct errors.  Please read the chart carefully and recognize, using context, where substitutions have occurred.  If you have any questions or concerns about the context, text or information contained within the body of this dictation, please contact myself, the provider, for further clarification      "

## 2024-09-26 NOTE — ASSESSMENT & PLAN NOTE
Lab Results   Component Value Date    HGBA1C 8.4 (H) 09/02/2024   On insulin, per endocrinology or his PCP

## 2024-10-03 ENCOUNTER — TELEPHONE (OUTPATIENT)
Dept: CARDIAC SURGERY | Facility: CLINIC | Age: 59
End: 2024-10-03

## 2024-10-03 NOTE — TELEPHONE ENCOUNTER
provider out of the office, called patient and left a message to call the office to reschedule appt.

## 2024-10-07 NOTE — TELEPHONE ENCOUNTER
Caller: Merilyn Reyes    Doctor/Office: Dr. Hernandez    Call regarding :  Calling to r/s PO appt that was cx.     Call was transferred to: Chelsi

## 2024-10-10 ENCOUNTER — TELEPHONE (OUTPATIENT)
Dept: NEUROSURGERY | Facility: CLINIC | Age: 59
End: 2024-10-10

## 2024-10-10 NOTE — TELEPHONE ENCOUNTER
Contacted patient to rs as MRI was not completed    Patient stated he would like to continue care in [place] and requested to cancel appt; will be following up with PCP.

## 2024-10-14 ENCOUNTER — OFFICE VISIT (OUTPATIENT)
Dept: CARDIAC SURGERY | Facility: CLINIC | Age: 59
End: 2024-10-14

## 2024-10-14 VITALS
BODY MASS INDEX: 25.4 KG/M2 | DIASTOLIC BLOOD PRESSURE: 74 MMHG | HEART RATE: 68 BPM | OXYGEN SATURATION: 100 % | TEMPERATURE: 98 F | WEIGHT: 138 LBS | HEIGHT: 62 IN | SYSTOLIC BLOOD PRESSURE: 136 MMHG

## 2024-10-14 DIAGNOSIS — I63.9 CEREBROVASCULAR ACCIDENT (CVA), UNSPECIFIED MECHANISM (HCC): ICD-10-CM

## 2024-10-14 DIAGNOSIS — E11.59 TYPE 2 DIABETES MELLITUS WITH OTHER CIRCULATORY COMPLICATION, WITHOUT LONG-TERM CURRENT USE OF INSULIN (HCC): ICD-10-CM

## 2024-10-14 DIAGNOSIS — Z95.1 S/P CABG X 2: Primary | ICD-10-CM

## 2024-10-14 DIAGNOSIS — I10 PRIMARY HYPERTENSION: ICD-10-CM

## 2024-10-14 DIAGNOSIS — I25.10 CAD, MULTIPLE VESSEL: ICD-10-CM

## 2024-10-14 PROCEDURE — 99024 POSTOP FOLLOW-UP VISIT: CPT | Performed by: THORACIC SURGERY (CARDIOTHORACIC VASCULAR SURGERY)

## 2024-10-14 NOTE — PROGRESS NOTES
Procedure: S/P coronary artery bypass grafting, performed on 9/11/24    History: Aristoteles Toribio Reyes is a 59 y.o. year old male who presents to our office today for routine post-surgical follow up care.  The patient’s postoperative hospital course was uneventful. He was started on normal post op med regimen. Endocrine followed for DMII and he was discharged on insulin. He was discharged POD4 on 9/15/24.      Since his discharge, he reports feeling well & an improvement in symptoms. He admits to mild incisional chest pain and EVH site discomfort. Both are healing well. Denies  palpitations, SOB, GARCIA, lightheadedness, dizziness, presyncopal symptoms, recent weight gain/loss, LE edema b/l, or signs of incisional erythema/tenderness/discharge. He has been compliant with his driving/lifting restrictions up to this point. He ambulates daily at home. He states his incisional pain has been well controlled with oral medication. HE is not sleeping well. Has a good appetite. Needs to schedule cardiac rehab.    He had a follow-up appointment with Cardiology on 9/26/24. He will be follolwing up with his PCP in [facility].    Vital Signs:   There were no vitals filed for this visit.    Home Medications:   Prior to Admission medications    Medication Sig Start Date End Date Taking? Authorizing Provider   acetaminophen (TYLENOL) 650 mg CR tablet Take 1 tablet (650 mg total) by mouth every 8 (eight) hours as needed for mild pain 9/15/24 10/15/24  Drew Daugherty PA-C   Alcohol Swabs (Alcohol Prep) 70 % PADS USE WITH TESTING SUPPLIES AS DIRECTED 6/24/24   Historical Provider, MD   aspirin 325 mg tablet Take 1 tablet (325 mg total) by mouth daily 9/15/24   Drew Daugherty PA-C   atorvastatin (LIPITOR) 80 mg tablet Take 1 tablet (80 mg total) by mouth daily with dinner 9/26/24   SRINIVAS Longoria   Blood Glucose Monitoring Suppl (Prodigy Pocket Blood Glucose) w/Device KIT 2 (two) times a day Test 6/24/24   Historical Provider,  MD   docusate sodium (COLACE) 100 mg capsule Take 1 capsule (100 mg total) by mouth 2 (two) times a day 9/15/24 10/15/24  Drew Daugherty PA-C   Insulin Glargine Solostar (Lantus SoloStar) 100 UNIT/ML SOPN Inject 0.2 mL (20 Units total) under the skin daily at bedtime 9/15/24 12/14/24  Drew Daugherty PA-C   insulin lispro (HumaLOG KwikPen) 100 units/mL injection pen Inject 5 Units under the skin 3 (three) times a day with meals 9/15/24 12/14/24  Drew Daugherty PA-C   Insulin Lispro Prot & Lispro (Insulin Lispro Prot & Lispro) (75-25) 100 units/mL injection pen INJECT 28 UNITS UNDER THE SKIN TWICE DAILY WITH MEALS 6/24/24   Historical Provider, MD   Insulin Pen Needle 32G X 4 MM MISC Inject Lantus qHS and Novolog/Humalog TID with meals, as directed. Dx: DM Ell.9 9/15/24   Drew Daugherty PA-C   Melatonin 3-10 MG TABS Take 5 mg by mouth daily at bedtime as needed (insomnia) 9/26/24   SRINIVAS Longoria   metFORMIN (FORTAMET) 1000 MG (OSM) 24 hr tablet Take 1,000 mg by mouth daily with breakfast    Historical Provider, MD   metFORMIN (GLUCOPHAGE) 500 mg tablet  6/24/24   Historical Provider, MD   metoprolol tartrate (LOPRESSOR) 50 mg tablet Take 1 tablet (50 mg total) by mouth every 12 (twelve) hours 9/26/24   SRINIVAS Longoria   polyethylene glycol (GLYCOLAX) 17 GM/SCOOP powder Take 17 g by mouth daily  Patient not taking: Reported on 9/26/2024 9/15/24 10/15/24  LAURA Dwyer No Coding Blood Gluc test strip 2 (two) times a day Test 6/24/24   Historical Provider, MD   Prodigy Twist Top Lancets 28G MISC 2 (two) times a day Test 6/24/24   Historical Provider, MD   sildenafil (VIAGRA) 100 mg tablet Take 100 mg by mouth  Patient not taking: Reported on 9/26/2024 8/12/24   Historical Provider, MD   tamsulosin (FLOMAX) 0.4 mg Take 0.4 mg by mouth daily  Patient not taking: Reported on 9/26/2024 8/12/24   Historical Provider, MD   traZODone (DESYREL) 50 mg tablet Take 50 mg by mouth daily at bedtime  "8/29/24   Historical Provider, MD Huerta FlexTouch 100 units/mL injection pen  9/16/24   Historical Provider, MD       Physical Exam:    HEENT/NECK:  Normocephalic. Atraumatic.  no jugular venous distention.    Cardiac: Regular rate and rhythm and No murmurs/rubs/gallops  Pulmonary:  Breath sounds clear bilaterally and No rales/rhonchi/wheezes  Abdomen:  Non-tender, Non-distended, and Normal bowel sounds  Incisions: Sternum is stable.  Incision is clean, dry, and intact.  and Saphenectomy incison is clean, dry, and intact.   Extremities: Extremities warm/dry and No edema B/L  Neuro: Alert and oriented X 3.  Sensation is grossly intact.  No focal deficits.  Skin: Warm/Dry, without rashes or lesions.    Lab Results:               Invalid input(s): \"LABGLOM\"      Lab Results   Component Value Date    HGBA1C 8.4 (H) 09/02/2024     No results found for: \"CKTOTAL\", \"CKMB\", \"CKMBINDEX\", \"TROPONINI\"    Imaging Studies:     None since admission for surgery        Assessment: Coronary artery disease. S/P coronary artery bypass grafting;    Plan:     Aristoteles Toribio Reyes continues to recover well following surgery.  To date they have made progressive improvements with their physical rehabilitation. At this point I have cleared them to begin outpatient cardiac rehabilitation and have encouraged them to to do so.  Aristoteles Toribio Reyes has also been cleared to resume driving. I asked that them do so cautiously, in progressive increments. I did remind them of their ongoing lifting restrictions of 25 pounds for an additional 2 months.    Aristoteles Toribio Reyes has already been evaluated by their primary care physician cardiologist for ongoing medical care. At this point we will not schedule Aristoteles Toribio Reyes  for routine followup care with our office. Future medical management will be directed by their outpatient cardiologist.. I have advised them to call with any new concerns that may arise. Russ" Toribio Reyes was comfortable with our recommendations and their questions were answered to their satisfaction.        Emily Haynes PA-C  10/14/24    * This note was completed in part utilizing [employer] direct voice recognition software.   Grammatical errors, random word insertion, spelling mistakes, and incomplete sentences may be an occasional consequence of the system secondary to software limitations, ambient noise and hardware issues. At the time of dictation, efforts were made to edit, clarify and /or correct errors. Please read the chart carefully and recognize, using context, where substitutions have occurred.  If you have any questions or concerns about the context, text or information contained within the body of this dictation, please contact myself, the provider, for further clarification.

## 2024-10-28 ENCOUNTER — TELEPHONE (OUTPATIENT)
Dept: CARDIAC SURGERY | Facility: CLINIC | Age: 59
End: 2024-10-28

## 2024-10-28 ENCOUNTER — TELEPHONE (OUTPATIENT)
Age: 59
End: 2024-10-28

## 2024-10-28 NOTE — TELEPHONE ENCOUNTER
Patients spouse showed up to the  requesting assistance completing her fmla form. Form was completed and signed by Dr. Hernandez.

## 2024-10-28 NOTE — TELEPHONE ENCOUNTER
Caller: Anitra    Doctor: Mary    Reason for call: Spouse is faxing over FMLA paperwork to the office and would like for it to be filled out and returned. Please call her when it's received. Patient lives 2 hours away and would like to spare the commute. Spoke with Yoav.    [place] interp: [place] [id number]    Call back#: [phone]

## 2025-01-21 ENCOUNTER — TELEPHONE (OUTPATIENT)
Age: 60
End: 2025-01-21

## 2025-01-21 NOTE — TELEPHONE ENCOUNTER
Received call from Val from [facility] asking to confirm the number and address for Dr. Hernandez. Informed Val the number to reach CT surg is [phone] and the address is [address] [place] [address], [id number]. No further questions.

## 2025-02-19 NOTE — H&P
INTERNAL MEDICINE RESIDENCY ADMISSION H&P     Name: Aristoteles Toribio Reyes   Age & Sex: 58 y.o. male   MRN: [id number]  Unit/Bed#: PPHP-321-01   Encounter: [id number]  Primary Care Provider: No primary care provider on file.    Code Status: Level 1 - Full Code  Admission Status: INPATIENT   Disposition: Patient requires Med/Surg with Telemetry    Admit to team: SOD Team A    ASSESSMENT/PLAN     Principal Problem:    CAD, multiple vessel  Active Problems:    HTN (hypertension)    NSTEMI (non-ST elevated myocardial infarction) (HCC)    CVA (cerebral vascular accident) (HCC)    Type 2 diabetes mellitus, without long-term current use of insulin (HCC)    Stroke-like symptoms old CVA on CT      Stroke-like symptoms old CVA on CT  Assessment & Plan  Patient presented with chest pain and numbness and tingling in right upper and lower extremities.  (Since resolved)    Neurology initially consulted in [facility]; has since signed off  CT head showed no acute pathology  S/p aspirin load, baby aspirin  MRI unremarkable compared to CTH  Cw Atorvastatin   TTE EF 50%  Cw Telemetry  Qshift neuro checks  A1C 8.4  PT/OT/ST    Type 2 diabetes mellitus, without long-term current use of insulin ([facility])  Assessment & Plan  Lab Results   Component Value Date    HGBA1C 8.4 (H) 09/02/2024       Recent Labs     09/04/24  1625 09/04/24  1725 09/04/24  2123 09/05/24  0625   POCGLU 236* 189* 250* 194*       Blood Sugar Average: Last 72 hrs:    hold home dose of metformin    Will start 6U Lantus; Lispro 2U tid; SSI algo 1    CVA (cerebral vascular accident) (HCC)  Assessment & Plan  On CAT scan patient has old inferior cerebellar infarct  Does not have any focal logical deficit on exam  He ambulates without any assistance  Asa and statin  Repeat CTA head and neck no evidence of acute infarct  MRI unremarkable  Neurology signed off in [place]  Consider reconsulting neuro in [place] if there's a return of LLE weakness    NSTEMI (non-ST  Physical Therapy Visit    Visit Type: Daily Treatment Note  Visit: 6  Referring Provider: Chidi Boss MD  Medical Diagnosis (from order): M25.562 - Left medial knee pain   Patient alert and oriented X3.    SUBJECTIVE                                                                                                               (2/19/25)  Patient is reporting improvement in symptoms. Still having a little bit of tightness behind the knee, but he is stretching as needed.    Eval (1/15/24)  Patient presents to physical therapy with subacute L knee pain, which started a little bit before Thanksgiving when patient was participating in [employer] and performed a leg extension exercise and felt a pain on the inside of his L knee. Patient subsequently walked 12k steps downtown, which aggravated his symptoms. Patient reports pain with walking, stairs, bending, and twisting. He avoids stairs if able. He has to do some lifting at work; usually lighter, but can be up to 30-40#. X-rays negative and patient planning on following up with MD for steroid injection.    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 [employer]  Hobbies: walking, hiking, history of mountain biking      OBJECTIVE                                                                                                                     Range of Motion (ROM)   (degrees unless noted; active unless noted; norms in ( ); negative=lacking to 0, positive=beyond 0)  Knee:   - Flexion (150):       Left:  135    - Extension (0-10):       Left:  0                          Treatment     Therapeutic Exercise  Recumbent Bike     - 5 minutes  level 10  Seated Resisted LAQ and Hamstring Curls     - Green TheraBand x 15 reps bilaterally for each exercise  SL Sit-to-Stand     - 10 reps  bilaterally  Resisted Hip Abduction     - Green TheraBand x 15 reps bilaterally  Step-Ups     - 6 in steps     - Forward: 15 reps bilaterally     - Lateral: 15 reps bilaterally  Discussion on plan of care    Skilled input: verbal instruction/cues and tactile instruction/cues    Writer verbally educated and received verbal consent for hand placement, positioning of patient, and techniques to be performed today from patient for clothing adjustments for techniques, therapist position for techniques and hand placement and palpation for techniques as described above and how they are pertinent to the patient's plan of care.  Home Exercise Program  Access Code: [email or web address]  URL: [email or web address]  Date: 01/15/2025  Prepared by: Edison Carroll    Exercises  - Seated Isometric Knee Extension  - 1 x daily - 7 x weekly - 3 sets - 5 reps - 10 sec hold    Access Code: [email or web address]  URL: [email or web address]  Date: 01/29/2025  Prepared by: Edison Carroll    Exercises  - Seated Hamstring Curl with Anchored Resistance  - 1 x daily - 5 x weekly - 3 sets - 10-15 reps  - Sitting Knee Extension with Resistance  - 1 x daily - 5 x weekly - 3 sets - 10-15 reps  - Knee Rotations in Sitting  - 1 x daily - 7 x weekly - 3 sets - 10 reps      ASSESSMENT                                                                                                            Today's Session  Patient tolerated today's physical therapy interventions very well, with very mild increases in L knee symptoms. Improvement in L knee AROM noted today, with patient achieving full extension. Symptoms have decreased significantly. Patient is compliant with HEP and motivated to participate in therapy to achieve his goals to decrease pain and improve function.    Statement of Medical Necessity  Patient would benefit from skilled physical therapy to ensure safe and scaled progression of interventions and exercises to  elevated myocardial infarction) (HCC)  Assessment & Plan  Typical chest pain with elevated troponin  NSTEMI  Heparin gtt per cardiology  Patient is on aspirin 325 mg after the stroke, continue with the same dose and Lipitor 80 mg  Echocardiogram performed, EF 50%    HTN (hypertension)  Assessment & Plan  Patient admitted with blood pressure more than 200 systolic  Admitted with retrosternal crushing chest pain since last night  EKG showed normal sinus rhythm  Patient was given IV NT hypertensives, sublingual nitro  Blood pressure later improved, troponin significantly elevated more than 500  Patient not sure about all of his medicines but he brought lisinopril, aspirin 325 mg   Patient started on amlodipine 5 mg, lisinopril 20 mg, and metoprolol succinate 12.5 mg daily; hydralazine 10 mg IV as needed for SBP greater than 160    * CAD, multiple vessel  Assessment & Plan  Presented with substernal chest pain unresolved with nitro  Troponins elevated 0-hour 517, 2-hour 2088, 4-hour 2187  Patient is on aspirin 81 mg and Lipitor 80 mg  Cardiac Testing:  Echo: EF 50%  Catheterization: Multiple vessels with 80-90% occlusions  Transferred to [place] for CABG evaluation  Continue on telemetry        VTE Pharmacologic Prophylaxis: Heparin  VTE Mechanical Prophylaxis: sequential compression device    CHIEF COMPLAINT   No chief complaint on file.     HISTORY OF PRESENT ILLNESS     Patient is a 58-year-old M with PMH of HTN, T2DM, HLD, CVA who initially presented to [facility] on 9/[facility]/2024 due to complaints of CP that started 1 day PTA.  However, prior to arrival patient started to experience fluctuating weakness in the LLE with initial NIHSS of 1 but then improved to 0 on repeat exam.  CT head showed chronic L cerebellar infarct without any new acute abnormalities.  Thus, not a TNK candidate.MRI revealed no acute process but did show calcifications.  Neurology had signed off.  For the chest pain workup, patient did have elevated  decrease impairments (listed at eval) and improve function. This will allow the patient to safely and efficiently complete daily tasks associated with accessing his occupational, community, and home environment.  Education:   - Results of above outlined education: Verbalizes understanding and Demonstrates understanding    PLAN                                                                                                                           Suggestions for next session as indicated: Progress Per Plan of Care     - General Notes     - HEP        - HEP 1 issued at evaluation (1/15/25)        - HEP 2 issued on 1/29/25        - Update HEP as appropriate     - Objective Data to Re-Measure        - LEFS, walk and stairs, 5x sts and TUG        - LE AROM and MMT, LE PROM and 90-90        - Future: 6 MWT, body weight squat, SL heel raise, eccentric heel touch     - Areas to Improve/Target        - Patient Goals: decrease pain and improved function        - Outcome Tool Tasks: walking, stairs, squatting     - Next Session        - Discuss plan/visits if still trending up        - Focus:           - Ask patient how gym exercises are going           - Review HEP and therapeutic exercises (see if we want to update HEP)              - Hip abduction           - Stretching and manual: hamstring, calf, sciatic nerve glides           - RDL, total gym, assisted squats           - Band walks           - Step-ups (forward and lateral)           - Transfer from floor           - Manual: meniscus maneuver/gapping           - Discuss plan (PCP vs ortho)        - General: nustep/bike, measure L knee AROM, manual, review HEP, progress ex           - Manual Therapy: STM, mobs     - Future Ideas        - Readminister LEFS outcome tool by visit 3-4, or as appropriate        - Knee Strengthening: resisted LAQ and hamstring curls, isometrics and eccentrics        - Hip Strengthening: supine cluster, hip 3 way        - Functional Activities:  troponins as high as 2187.  Patient was loaded on aspirin, and started on high intensity statin.  Echo showed EF of 50%.  Cardiac catheterization performed on 2024 revealing multi vessel disease.  This including mid RCA 80% segmental stenosis, proximal to mid LAD diffuse 80 to 90% stenosis, Diag 1 80% but smaller caliber, circumflex/OM1 30% stenosis.  Otherwise, patient has been hemodynamically stable.  A1c 8.4.  Lipid panel, total cholesterol 162, triglycerides 185, HDL 49, LDL 76.  Other blood work including CBC and BMP stable.  Thus, requesting transfer to [facility] for [facility]G evaluation.    Patient seen and examined with Dr. Tate.  Patient has no acute complaints at this time.  He denies chest pain, dyspnea, palpitations at this time.  He does report having 15 to 20 pound weight loss in 2 months due to low p.o. intake.  He reports being fatigued for the past 3 months.      REVIEW OF SYSTEMS     Review of Systems   Constitutional:  Positive for appetite change and fatigue. Negative for chills and fever.   HENT:  Negative for ear pain and sore throat.    Eyes:  Negative for pain and visual disturbance.   Respiratory:  Negative for cough and shortness of breath.    Cardiovascular:  Negative for chest pain and palpitations.   Gastrointestinal:  Negative for abdominal pain and vomiting.   Genitourinary:  Negative for dysuria and hematuria.   Musculoskeletal:  Negative for arthralgias and back pain.   Skin:  Negative for color change and rash.   Neurological:  Negative for seizures, syncope and headaches.   All other systems reviewed and are negative.    OBJECTIVE     Vitals:    24 1802 24 1855 24 1857   BP: 140/85  138/87   BP Location: Left arm     Pulse:  83 83   Temp:   98.8 °F (37.1 °C)   SpO2: 98% 100% 100%      Temperature:   Temp (24hrs), Av.2 °F (36.8 °C), Min:97.8 °F (36.6 °C), Max:98.8 °F (37.1 °C)    Temperature: 98.8 °F (37.1 °C)  Intake & Output:  I/O       None           Weights:        There is no height or weight on file to calculate BMI.  Weight (last 2 days)       None          Physical Exam  Vitals and nursing note reviewed.   Constitutional:       General: He is not in acute distress.     Appearance: He is well-developed.   HENT:      Head: Normocephalic and atraumatic.   Eyes:      Conjunctiva/sclera: Conjunctivae normal.   Cardiovascular:      Rate and Rhythm: Normal rate and regular rhythm.      Heart sounds: No murmur heard.  Pulmonary:      Effort: Pulmonary effort is normal. No respiratory distress.      Breath sounds: Normal breath sounds.   Abdominal:      Palpations: Abdomen is soft.      Tenderness: There is no abdominal tenderness.   Musculoskeletal:         General: No swelling.      Cervical back: Neck supple.   Skin:     General: Skin is warm and dry.      Capillary Refill: Capillary refill takes less than 2 seconds.   Neurological:      Mental Status: He is alert.   Psychiatric:         Mood and Affect: Mood normal.       PAST MEDICAL HISTORY     Past Medical History:   Diagnosis Date   • History of cardioembolic cerebrovascular accident (CVA) 09/02/2024   • History of cardioembolic cerebrovascular accident (CVA) 09/02/2024     PAST SURGICAL HISTORY   No past surgical history on file.  SOCIAL & FAMILY HISTORY     Social History     Substance and Sexual Activity   Alcohol Use Never       Social History     Substance and Sexual Activity   Drug Use Never     Social History     Tobacco Use   Smoking Status Some Days   • Types: Cigarettes   Smokeless Tobacco Not on file     No family history on file.  LABORATORY DATA     Labs: I have personally reviewed pertinent reports.    Results from last 7 days   Lab Units 09/05/24  0838 09/04/24 0225 09/03/24  0352   WBC Thousand/uL 7.09 8.84 10.03   HEMOGLOBIN g/dL 12.2 12.3 12.3   HEMATOCRIT % 36.6 35.9* 35.4*   PLATELETS Thousands/uL 296 300 289      Results from last 7 days   Lab Units 09/05/24  0838 09/04/24 0225  sts, mini squats, step-ups/downs/overs    Goals  Long Term Goals: to be met by end of plan of care  1. Patient will be independent and compliant with original HEP and any modifications/updates throughout the duration of this episode of care.  2. Lower Extremity Functional Scale: Patient will complete outcome measure to reflect an improved raw score to greater than or equal to 55/80 to indicate patient reported improvement in disability/function/impairment. (MDC: 9 points)  3. Patient will improve Timed Up and Go test to lesser than or equal to 8 seconds to indicate a decrease in fall risk and an improvement in functional ability. (Fall Risk: >16 seconds)  4. Patient will improve L knee AROM to -3-134 degrees to assist with efficiency with ambulation and stair negotiation.  5. Patient will improve L knee flexion and extension strength to 5/5 without pain to assist with stair negotiation and lifting tasks.      Therapy procedure time and total treatment time can be found documented on the Time Entry flowsheet     "09/03/24  0352   POTASSIUM mmol/L 3.9 3.6 3.7   CHLORIDE mmol/L 106 107 107   CO2 mmol/L 22 23 22   BUN mg/dL 21 22 16   CREATININE mg/dL 1.07 1.10 0.95   CALCIUM mg/dL 8.7 9.0 9.2   ALK PHOS U/L  --   --  62   ALT U/L  --   --  14   AST U/L  --   --  20     Results from last 7 days   Lab Units 09/05/24  0838 09/04/24  1124 09/03/24  0352   MAGNESIUM mg/dL 2.0 1.5* 1.4*     Results from last 7 days   Lab Units 09/03/24  0352   PHOSPHORUS mg/dL 2.9      Results from last 7 days   Lab Units 09/05/24  1149 09/05/24  0352 09/04/24  1943 09/02/24  1944 09/02/24  1302 09/02/24  1006   INR   --   --   --   --  1.00 0.97   PTT seconds 56* 63* 44*   < > 29 29    < > = values in this interval not displayed.             Micro:  No results found for: \"BLOODCX\", \"URINECX\", \"WOUNDCULT\", \"SPUTUMCULTUR\"  IMAGING & DIAGNOSTIC TESTS     Imaging: I have personally reviewed pertinent reports.    MRI brain wo contrast    Result Date: 9/3/2024  Impression: 1. No acute process seen 2. Susceptibility focus in the right periatrial white matter corresponds to the calcification on head CT. This is nonspecific and may be due to prior granulomatous process or cavernous malformation. Workstation performed: [id number]     CT stroke alert brain    Addendum Date: 9/2/2024    ADDENDUM: There is nonspecific 5 mm calcification versus calcific lesion in the right temporo-occipital junction (series 2 image 20). Recommend further characterization with brain MRI without and with contrast if no prior outside imaging is available. This study was marked in EPIC for notification and follow-up.     Result Date: 9/2/2024  Impression: 1.  No acute intracranial CT abnormality. 2.  Chronic appearing inferior left cerebellar infarct. I personally discussed this study with JAMES REDMOND on 9/2/2024 10: 41 AM. Workstation performed: [id number]     CTA dissection protocol chest/abdomen/pelvis    Result Date: 9/2/2024  Impression: No acute findings in the chest, " abdomen, or pelvis. Specifically, no findings of aortic aortic syndrome. Resident: HERON BEST I, the attending radiologist, have reviewed the images and agree with the final report above. Workstation performed: [id number]     CTA stroke alert (head/neck)    Result Date: 9/2/2024  Impression: 1.   Patent major vessels of the [place] of cason without high-grade stenosis.  No aneurysm. 2.  Atherosclerotic changes without hemodynamically significant stenosis in the cervical carotid or vertebral arteries. I personally discussed this study with JAMES REDMOND on 9/2/2024 10: 41 AM. Workstation performed: [id number]     EKG, Pathology, and Other Studies: I have personally reviewed pertinent reports.     ALLERGIES   No Known Allergies  MEDICATIONS PRIOR TO ARRIVAL     Prior to Admission medications    Medication Sig Start Date End Date Taking? Authorizing Provider   lisinopril (ZESTRIL) 10 mg tablet Take 10 mg by mouth daily    Historical Provider, MD   metFORMIN (GLUCOPHAGE) 1000 MG tablet Take 1,000 mg by mouth 2 (two) times a day with meals    Historical Provider, MD     MEDICATIONS ADMINISTERED IN LAST 24 HOURS     Medication Administration - last 24 hours from 09/04/2024 1937 to 09/05/2024 1937         Date/Time Order Dose Route Action Action by     09/05/2024 1849 EDT heparin (porcine) 25,000 units in 0.45% NaCl 250 mL infusion (premix) 18 Units/kg/hr Intravenous New Bag Kayy Joyner RN     09/05/2024 1903 EDT insulin lispro (HumALOG/ADMELOG) 100 units/mL subcutaneous injection 3 Units 3 Units Subcutaneous Given Kayy Joyner RN          CURRENT MEDICATIONS     Current Facility-Administered Medications   Medication Dose Route Frequency Provider Last Rate   • [START ON 9/6/2024] amLODIPine  5 mg Oral Daily SRINIVAS Wilkerson     • [START ON 9/6/2024] aspirin  81 mg Oral Daily SRINIVAS Wilkerson     • [START ON 9/6/2024] atorvastatin  80 mg Oral Daily With Dinner Emmy  "SRINIVAS Lemus     • chlorhexidine  15 mL Mouth/Throat Q12H KALYANI SRINIVAS Wilkerson     • heparin (porcine)  3-30 Units/kg/hr (Order-Specific) Intravenous Titrated SRINIVAS Wilkerson 18 Units/kg/hr (09/05/24 1849)   • heparin (porcine)  2,400 Units Intravenous Q6H PRN SRINIVAS Wilkerson     • heparin (porcine)  4,800 Units Intravenous Q6H PRN SRINIVAS Wilkerson     • insulin glargine  6 Units Subcutaneous HS Ceci Tate DO     • [START ON 9/6/2024] insulin lispro  1-5 Units Subcutaneous TID AC SRINIVAS Wilkerson     • insulin lispro  3 Units Subcutaneous TID With Meals Ceci Tate DO     • [START ON 9/6/2024] lisinopril  10 mg Oral Daily SRINIVAS Wilkerson     • [START ON 9/6/2024] metoprolol succinate  12.5 mg Oral Daily SRINIVAS Wilkerson     • nitroglycerin  0.4 mg Sublingual Q5 Min PRN SRINIVAS Wilkerson         heparin (porcine), 3-30 Units/kg/hr (Order-Specific), Last Rate: 18 Units/kg/hr (09/05/24 1849)      heparin (porcine), 2,400 Units, Q6H PRN  heparin (porcine), 4,800 Units, Q6H PRN  nitroglycerin, 0.4 mg, Q5 Min PRN        Admission Time  I spent 45 minutes admitting the patient.  This involved direct patient contact where I performed a full history and physical, reviewing previous records, and reviewing laboratory and other diagnostic studies.    Portions of the record may have been created with voice recognition software.  Occasional wrong word or \"sound a like\" substitutions may have occurred due to the inherent limitations of voice recognition software.  Read the chart carefully and recognize, using context, where substitutions have occurred.    ==  Melissa Mullins MD  [facility]  Internal Medicine Residency PGY-1  "

## (undated) DEVICE — SUT PROLENE 7-0 BV175-8/BV175-8 24 IN EPM8747

## (undated) DEVICE — THERMOFLECT BLANKET, L, 25EA                               TS THERMOFLECT BLANKET, 48" X 84", SILVER, 5/BG, 5 BG/CS NW: Brand: THERMOFLECT

## (undated) DEVICE — GLOVE SRG BIOGEL ECLIPSE 8

## (undated) DEVICE — RADIFOCUS OPTITORQUE ANGIOGRAPHIC CATHETER: Brand: OPTITORQUE

## (undated) DEVICE — GLIDESHEATH SLENDER STAINLESS STEEL KIT: Brand: GLIDESHEATH SLENDER

## (undated) DEVICE — PACK CUSTOM PERFUSION ANH

## (undated) DEVICE — SUT MONOCRYL PLUS 3-0 PS-2 27 IN MCP427H

## (undated) DEVICE — BLADE BEAVER MINI SZ 69

## (undated) DEVICE — CATH F5 INF PIG145 110CM 6SH: Brand: INFINITI

## (undated) DEVICE — TUBING INSUFFLATION SET ISO CONNECTOR

## (undated) DEVICE — ELECTRODE BLADE E-Z CLEAN 4IN -0014A

## (undated) DEVICE — MICRO HVTSA, 0.5G AND HVTSA SOURCEMARK PRODUCT CODE M1206 AND M1206-01: Brand: EXOFIN MICRO HVTSA, 0.5G

## (undated) DEVICE — SILVER-COATED ANTIBACTERIAL BARRIER DRESSING: Brand: ACTICOAT SURGIC 10X12CM 5PK US

## (undated) DEVICE — GLOVE INDICATOR PI UNDERGLOVE SZ 8 BLUE

## (undated) DEVICE — DRESSING ACTICOAT AG 4 X 5 IN

## (undated) DEVICE — AORTIC PUNCH 5.2 MM DISP

## (undated) DEVICE — SUT SILK 0 CT-1 30 IN 424H

## (undated) DEVICE — FILTER SMOKE EVAC VIROSAFE

## (undated) DEVICE — ADHESIVE SKIN HIGH VISCOSITY EXOFIN 1ML

## (undated) DEVICE — INTENDED FOR TISSUE SEPARATION, AND OTHER PROCEDURES THAT REQUIRE A SHARP SURGICAL BLADE TO PUNCTURE OR CUT.: Brand: BARD-PARKER ® CARBON RIB-BACK BLADES

## (undated) DEVICE — STERNAL WIRE

## (undated) DEVICE — LIGHT HANDLE COVER SLEEVE DISP BLUE STELLAR

## (undated) DEVICE — ALCON OPHTHALMIC KNIFE 15 °: Brand: ALCON

## (undated) DEVICE — TR BAND RADIAL ARTERY COMPRESSION DEVICE: Brand: TR BAND

## (undated) DEVICE — SUT SILK 2 60 IN SA8H

## (undated) DEVICE — SUT PROLENE 4-0 BB 36 IN 8581H

## (undated) DEVICE — AIRLIFE™ TRI-FLO™ SUCTION CATHETER: Brand: AIRLIFE™

## (undated) DEVICE — DGW .035 FC J3MM 260CM TEF: Brand: EMERALD

## (undated) DEVICE — PENCIL ELECTROSURG E-Z CLEAN -0035H

## (undated) DEVICE — LIGACLIP MCA MULTIPLE CLIP APPLIERS, 20 SMALL CLIPS: Brand: LIGACLIP

## (undated) DEVICE — JP CHANNEL DRAIN, 24FR HUBLESS: Brand: CARDINAL HEALTH

## (undated) DEVICE — PUMP TUBING FUSION PACK

## (undated) DEVICE — PACK CABG PBDS

## (undated) DEVICE — SUT ETHIBOND 2-0 SH-1/SH-1 30 IN X763H

## (undated) DEVICE — TRAY FOLEY 16FR SURESTEP TEMP SENS URIMETER STAT LOK

## (undated) DEVICE — GAUZE SPONGES,16 PLY: Brand: CURITY

## (undated) DEVICE — ACE WRAP 6 IN UNSTERILE

## (undated) DEVICE — NEEDLE INT MAMMARY ARTERIOTOMY CAN

## (undated) DEVICE — INTENDED FOR TISSUE SEPARATION, AND OTHER PROCEDURES THAT REQUIRE A SHARP SURGICAL BLADE TO PUNCTURE OR CUT.: Brand: BARD-PARKER SAFETY BLADES SIZE 15, STERILE

## (undated) DEVICE — PLEDGET CARDIO PTFE 9.5 X 4.8 SOFT LF (6EA/PK)

## (undated) DEVICE — PACK CUSTOM PERFUSION PLEG PK

## (undated) DEVICE — SUT PDS PLUS 1 CTB 36 IN PDPB359T

## (undated) DEVICE — SUT PROLENE 7-0 BV175-8/BV175-8 24IN EP8747H

## (undated) DEVICE — 32 FR RIGHT ANGLE – SOFT PVC CATHETER: Brand: PVC THORACIC CATHETERS

## (undated) DEVICE — SUT PROLENE 4-0 RB-1/RB-1 36 IN 8557H

## (undated) DEVICE — 3000CC GUARDIAN II: Brand: GUARDIAN

## (undated) DEVICE — SUT SILK 3-0 SH CR/8 18 IN C013D

## (undated) DEVICE — EVERGRIP INSERT SET 86MM: Brand: FOGARTY EVERGRIP

## (undated) DEVICE — VASOVIEW HEMOPRO 2: Brand: VASOVIEW HEMOPRO 2

## (undated) DEVICE — 40601 PROLONGED POSITIONING SYSTEM: Brand: 40601 PROLONGED POSITIONING SYSTEM

## (undated) DEVICE — SUT PROLENE 5-0 C-1/C-1 36 IN 8321H

## (undated) DEVICE — OASIS DRAIN, SINGLE, INLINE & ATS COMPATIBLE: Brand: OASIS

## (undated) DEVICE — PLUMEPEN PRO 10FT

## (undated) DEVICE — DRESSING ALLEVYN LIFE HEEL 25 X 25.2CM

## (undated) DEVICE — SUT PROLENE 6-0 C-1/C-1 30 IN 8307H

## (undated) DEVICE — RECIP.STERNUM SAW BLADE 34/7.5/0.7MM: Brand: AESCULAP

## (undated) DEVICE — RED RUBBER URETHRAL CATHETER: Brand: DOVER

## (undated) DEVICE — BLANKET HYPOTHERMIA ADULT GAYMAR

## (undated) DEVICE — SYRINGE 50ML LL

## (undated) DEVICE — 32 FR STRAIGHT – SOFT PVC CATHETER: Brand: PVC THORACIC CATHETERS

## (undated) DEVICE — SUT MONOCRYL PLUS 4-0 PS-2 18 IN MCP496G

## (undated) DEVICE — BONE WAX WHITE: Brand: BONE WAX WHITE

## (undated) DEVICE — ANTIBACTERIAL UNDYED BRAIDED (POLYGLACTIN 910), SYNTHETIC ABSORBABLE SUTURE: Brand: COATED VICRYL